# Patient Record
Sex: MALE | Race: WHITE | NOT HISPANIC OR LATINO | Employment: FULL TIME | URBAN - METROPOLITAN AREA
[De-identification: names, ages, dates, MRNs, and addresses within clinical notes are randomized per-mention and may not be internally consistent; named-entity substitution may affect disease eponyms.]

---

## 2019-02-07 ENCOUNTER — OFFICE VISIT (OUTPATIENT)
Dept: ORTHOPEDIC SURGERY | Facility: CLINIC | Age: 54
End: 2019-02-07

## 2019-02-07 VITALS — HEIGHT: 70 IN | BODY MASS INDEX: 32.93 KG/M2 | WEIGHT: 230 LBS | TEMPERATURE: 98 F

## 2019-02-07 DIAGNOSIS — M75.121 COMPLETE TEAR OF RIGHT ROTATOR CUFF: ICD-10-CM

## 2019-02-07 DIAGNOSIS — M54.2 NECK AND SHOULDER PAIN: ICD-10-CM

## 2019-02-07 DIAGNOSIS — Z87.828 HISTORY OF MOTOR VEHICLE ACCIDENT: Primary | ICD-10-CM

## 2019-02-07 DIAGNOSIS — M25.519 NECK AND SHOULDER PAIN: ICD-10-CM

## 2019-02-07 PROCEDURE — 73030 X-RAY EXAM OF SHOULDER: CPT | Performed by: ORTHOPAEDIC SURGERY

## 2019-02-07 PROCEDURE — 99204 OFFICE O/P NEW MOD 45 MIN: CPT | Performed by: ORTHOPAEDIC SURGERY

## 2019-02-07 RX ORDER — ALPRAZOLAM 1 MG/1
TABLET ORAL
COMMUNITY
End: 2019-08-27

## 2019-02-07 RX ORDER — HYDROCODONE BITARTRATE AND ACETAMINOPHEN 7.5; 325 MG/1; MG/1
TABLET ORAL
COMMUNITY
End: 2022-06-10

## 2019-02-07 RX ORDER — METOPROLOL SUCCINATE 50 MG/1
TABLET, EXTENDED RELEASE ORAL
COMMUNITY
End: 2019-09-20 | Stop reason: SDUPTHER

## 2019-02-07 RX ORDER — ATORVASTATIN CALCIUM 80 MG/1
80 TABLET, FILM COATED ORAL NIGHTLY
COMMUNITY

## 2019-02-07 RX ORDER — NAPROXEN 500 MG/1
TABLET ORAL
COMMUNITY
End: 2019-08-30

## 2019-02-07 RX ORDER — ZOLPIDEM TARTRATE 10 MG/1
TABLET ORAL
Status: ON HOLD | COMMUNITY
End: 2019-03-13

## 2019-02-07 RX ORDER — LISINOPRIL 10 MG/1
TABLET ORAL
Status: ON HOLD | COMMUNITY
End: 2019-03-13

## 2019-02-07 RX ORDER — TRAMADOL HYDROCHLORIDE 50 MG/1
TABLET ORAL
COMMUNITY
End: 2019-03-13 | Stop reason: HOSPADM
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
## 2019-02-14 PROBLEM — M75.121 COMPLETE TEAR OF RIGHT ROTATOR CUFF: Status: ACTIVE | Noted: 2019-02-14

## 2019-02-20 ENCOUNTER — TELEPHONE (OUTPATIENT)
Dept: ORTHOPEDIC SURGERY | Facility: CLINIC | Age: 54
End: 2019-02-20

## 2019-03-01 ENCOUNTER — APPOINTMENT (OUTPATIENT)
Dept: PREADMISSION TESTING | Facility: HOSPITAL | Age: 54
End: 2019-03-01

## 2019-03-01 VITALS
DIASTOLIC BLOOD PRESSURE: 84 MMHG | OXYGEN SATURATION: 95 % | WEIGHT: 228 LBS | RESPIRATION RATE: 20 BRPM | TEMPERATURE: 97.9 F | HEIGHT: 70 IN | BODY MASS INDEX: 32.64 KG/M2 | HEART RATE: 95 BPM | SYSTOLIC BLOOD PRESSURE: 152 MMHG

## 2019-03-01 LAB
ANION GAP SERPL CALCULATED.3IONS-SCNC: 11.6 MMOL/L
BUN BLD-MCNC: 16 MG/DL (ref 6–20)
BUN/CREAT SERPL: 18.6 (ref 7–25)
CALCIUM SPEC-SCNC: 9.1 MG/DL (ref 8.6–10.5)
CHLORIDE SERPL-SCNC: 107 MMOL/L (ref 98–107)
CO2 SERPL-SCNC: 22.4 MMOL/L (ref 22–29)
CREAT BLD-MCNC: 0.86 MG/DL (ref 0.76–1.27)
DEPRECATED RDW RBC AUTO: 42.5 FL (ref 37–54)
ERYTHROCYTE [DISTWIDTH] IN BLOOD BY AUTOMATED COUNT: 12.1 % (ref 12.3–15.4)
GFR SERPL CREATININE-BSD FRML MDRD: 94 ML/MIN/1.73
GLUCOSE BLD-MCNC: 132 MG/DL (ref 65–99)
HCT VFR BLD AUTO: 45.9 % (ref 37.5–51)
HGB BLD-MCNC: 15.4 G/DL (ref 13–17.7)
MCH RBC QN AUTO: 32 PG (ref 26.6–33)
MCHC RBC AUTO-ENTMCNC: 33.6 G/DL (ref 31.5–35.7)
MCV RBC AUTO: 95.4 FL (ref 79–97)
PLATELET # BLD AUTO: 210 10*3/MM3 (ref 140–450)
PMV BLD AUTO: 10.1 FL (ref 6–12)
POTASSIUM BLD-SCNC: 4.1 MMOL/L (ref 3.5–5.2)
RBC # BLD AUTO: 4.81 10*6/MM3 (ref 4.14–5.8)
SODIUM BLD-SCNC: 141 MMOL/L (ref 136–145)
WBC NRBC COR # BLD: 8.94 10*3/MM3 (ref 3.4–10.8)

## 2019-03-01 PROCEDURE — 36415 COLL VENOUS BLD VENIPUNCTURE: CPT

## 2019-03-01 PROCEDURE — 85027 COMPLETE CBC AUTOMATED: CPT | Performed by: ORTHOPAEDIC SURGERY

## 2019-03-01 PROCEDURE — 93010 ELECTROCARDIOGRAM REPORT: CPT | Performed by: INTERNAL MEDICINE

## 2019-03-01 PROCEDURE — 93005 ELECTROCARDIOGRAM TRACING: CPT

## 2019-03-01 PROCEDURE — 80048 BASIC METABOLIC PNL TOTAL CA: CPT | Performed by: ORTHOPAEDIC SURGERY

## 2019-03-01 RX ORDER — TRAMADOL HYDROCHLORIDE 50 MG/1
50 TABLET ORAL EVERY 6 HOURS PRN
Status: ON HOLD | COMMUNITY
End: 2019-03-13

## 2019-03-01 RX ORDER — CETIRIZINE HYDROCHLORIDE 10 MG/1
10 TABLET ORAL DAILY
COMMUNITY

## 2019-03-01 RX ORDER — ATORVASTATIN CALCIUM 80 MG/1
80 TABLET, FILM COATED ORAL EVERY EVENING
Status: ON HOLD | COMMUNITY
End: 2019-03-13

## 2019-03-01 RX ORDER — ZOLPIDEM TARTRATE 10 MG/1
10 TABLET ORAL NIGHTLY PRN
COMMUNITY
End: 2019-08-27

## 2019-03-01 RX ORDER — HYDROCODONE BITARTRATE AND ACETAMINOPHEN 7.5; 325 MG/1; MG/1
1 TABLET ORAL EVERY 6 HOURS PRN
Status: ON HOLD | COMMUNITY
End: 2019-03-13

## 2019-03-01 RX ORDER — LISINOPRIL 10 MG/1
10 TABLET ORAL EVERY EVENING
COMMUNITY
End: 2021-04-02 | Stop reason: SDUPTHER

## 2019-03-01 RX ORDER — METOPROLOL SUCCINATE 50 MG/1
50 TABLET, EXTENDED RELEASE ORAL EVERY EVENING
Status: ON HOLD | COMMUNITY
End: 2019-03-13

## 2019-03-01 RX ORDER — NAPROXEN 500 MG/1
500 TABLET ORAL AS NEEDED
Status: ON HOLD | COMMUNITY
End: 2019-03-13

## 2019-03-01 RX ORDER — ALPRAZOLAM 1 MG/1
1 TABLET ORAL 2 TIMES DAILY PRN
Status: ON HOLD | COMMUNITY
End: 2019-03-13

## 2019-03-01 NOTE — DISCHARGE INSTRUCTIONS
Take the following medications the morning of surgery with a small sip of water:  METOPROLOL        General Instructions:  • Do not eat solid food after midnight the night before surgery.  • You may drink clear liquids day of surgery but must stop at least one hour before your hospital arrival time.  • It is beneficial for you to have a clear drink that contains carbohydrates the day of surgery.  We suggest a 12 to 20 ounce bottle of Gatorade or Powerade for non-diabetic patients or a 12 to 20 ounce bottle of G2 or Powerade Zero for diabetic patients. (Pediatric patients, are not advised to drink a 12 to 20 ounce carbohydrate drink)    Clear liquids are liquids you can see through.  Nothing red in color.     Plain water                               Sports drinks  Sodas                                   Gelatin (Jell-O)  Fruit juices without pulp such as white grape juice and apple juice  Popsicles that contain no fruit or yogurt  Tea or coffee (no cream or milk added)  Gatorade / Powerade  G2 / Powerade Zero    • Infants may have breast milk up to four hours before surgery.  • Infants drinking formula may drink formula up to six hours before surgery.   • Patients who avoid smoking, chewing tobacco and alcohol for 4 weeks prior to surgery have a reduced risk of post-operative complications.  Quit smoking as many days before surgery as you can.  • Do not smoke, use chewing tobacco or drink alcohol the day of surgery.   • If applicable bring your C-PAP/ BI-PAP machine.  • Bring any papers given to you in the doctor’s office.  • Wear clean comfortable clothes and socks.  • Do not wear contact lenses or make-up.  Bring a case for your glasses.   • Bring crutches or walker if applicable.  • Remove all piercings.  Leave jewelry and any other valuables at home.  • Hair extensions with metal clips must be removed prior to surgery.  • The Pre-Admission Testing nurse will instruct you to bring medications if unable to obtain  an accurate list in Pre-Admission Testing.        If you were given a blood bank ID arm band remember to bring it with you the day of surgery.    Preventing a Surgical Site Infection:  • For 2 to 3 days before surgery, avoid shaving with a razor because the razor can irritate skin and make it easier to develop an infection.    • Any areas of open skin can increase the risk of a post-operative wound infection by allowing bacteria to enter and travel throughout the body.  Notify your surgeon if you have any skin wounds / rashes even if it is not near the expected surgical site.  The area will need assessed to determine if surgery should be delayed until it is healed.  • The night prior to surgery sleep in a clean bed with clean clothing.  Do not allow pets to sleep with you.  • Shower on the morning of surgery using a fresh bar of anti-bacterial soap (such as Dial) and clean washcloth.  Dry with a clean towel and dress in clean clothing.  • Ask your surgeon if you will be receiving antibiotics prior to surgery.  • Make sure you, your family, and all healthcare providers clean their hands with soap and water or an alcohol based hand  before caring for you or your wound.    Day of surgery:  Upon arrival, a Pre-op nurse and Anesthesiologist will review your health history, obtain vital signs, and answer questions you may have.  The only belongings needed at this time will be your home medications and if applicable your C-PAP/BI-PAP machine.  If you are staying overnight your family can leave the rest of your belongings in the car and bring them to your room later.  A Pre-op nurse will start an IV and you may receive medication in preparation for surgery, including something to help you relax.  Your family will be able to see you in the Pre-op area.  While you are in surgery your family should notify the waiting room  if they leave the waiting room area and provide a contact phone number.    Please be  aware that surgery does come with discomfort.  We want to make every effort to control your discomfort so please discuss any uncontrolled symptoms with your nurse.   Your doctor will most likely have prescribed pain medications.      If you are going home after surgery you will receive individualized written care instructions before being discharged.  A responsible adult must drive you to and from the hospital on the day of your surgery and stay with you for 24 hours.    If you are staying overnight following surgery, you will be transported to your hospital room following the recovery period.  [facility] has all private rooms.    You have received a list of surgical assistants for your reference.  If you have any questions please call Pre-Admission Testing at [phone].  Deductibles and co-payments are collected on the day of service. Please be prepared to pay the required co-pay, deductible or deposit on the day of service as defined by your plan.

## 2019-03-06 ENCOUNTER — APPOINTMENT (OUTPATIENT)
Dept: PREADMISSION TESTING | Facility: HOSPITAL | Age: 54
End: 2019-03-06

## 2019-03-07 ENCOUNTER — TELEPHONE (OUTPATIENT)
Dept: ORTHOPEDIC SURGERY | Facility: CLINIC | Age: 54
End: 2019-03-07

## 2019-03-08 ENCOUNTER — PREP FOR SURGERY (OUTPATIENT)
Dept: OTHER | Facility: HOSPITAL | Age: 54
End: 2019-03-08

## 2019-03-08 NOTE — TELEPHONE ENCOUNTER
This should be fine as long as all of his symptoms are gone we could check a white count that morning can you call him please

## 2019-03-08 NOTE — TELEPHONE ENCOUNTER
Left message for the patient that it should be okay for him to proceed with surgery next week.  Will plan to recheck his white blood cell count the morning of surgery per K Hg

## 2019-03-13 ENCOUNTER — HOSPITAL ENCOUNTER (OUTPATIENT)
Facility: HOSPITAL | Age: 54
Setting detail: HOSPITAL OUTPATIENT SURGERY
Discharge: HOME | End: 2019-03-13
Attending: ORTHOPAEDIC SURGERY | Admitting: ORTHOPAEDIC SURGERY

## 2019-03-13 ENCOUNTER — ANESTHESIA EVENT (OUTPATIENT)
Dept: PERIOP | Facility: HOSPITAL | Age: 54
End: 2019-03-13

## 2019-03-13 ENCOUNTER — ANESTHESIA (OUTPATIENT)
Dept: PERIOP | Facility: HOSPITAL | Age: 54
End: 2019-03-13

## 2019-03-13 VITALS
RESPIRATION RATE: 16 BRPM | DIASTOLIC BLOOD PRESSURE: 72 MMHG | TEMPERATURE: 97.8 F | BODY MASS INDEX: 31.57 KG/M2 | WEIGHT: 220.02 LBS | SYSTOLIC BLOOD PRESSURE: 141 MMHG | HEART RATE: 89 BPM | OXYGEN SATURATION: 92 %

## 2019-03-13 PROCEDURE — C9290 INJ, BUPIVACAINE LIPOSOME: HCPCS | Performed by: ANESTHESIOLOGY

## 2019-03-13 PROCEDURE — 25010000002 EPINEPHRINE PER 0.1 MG: Performed by: ORTHOPAEDIC SURGERY

## 2019-03-13 PROCEDURE — 25010000002 PHENYLEPHRINE PER 1 ML: Performed by: NURSE ANESTHETIST, CERTIFIED REGISTERED

## 2019-03-13 PROCEDURE — 25010000003 BUPIVACAINE LIPOSOME 1.3 % SUSPENSION: Performed by: ANESTHESIOLOGY

## 2019-03-13 PROCEDURE — 25010000002 ONDANSETRON PER 1 MG: Performed by: NURSE ANESTHETIST, CERTIFIED REGISTERED

## 2019-03-13 PROCEDURE — 25010000002 DEXAMETHASONE PER 1 MG: Performed by: NURSE ANESTHETIST, CERTIFIED REGISTERED

## 2019-03-13 PROCEDURE — 23412 REPAIR ROTATOR CUFF CHRONIC: CPT | Performed by: ORTHOPAEDIC SURGERY

## 2019-03-13 PROCEDURE — 25010000002 FENTANYL CITRATE (PF) 100 MCG/2ML SOLUTION: Performed by: ANESTHESIOLOGY

## 2019-03-13 PROCEDURE — 25010000002 PROPOFOL 10 MG/ML EMULSION: Performed by: NURSE ANESTHETIST, CERTIFIED REGISTERED

## 2019-03-13 PROCEDURE — 25010000003 CEFAZOLIN IN DEXTROSE 2-4 GM/100ML-% SOLUTION: Performed by: ORTHOPAEDIC SURGERY

## 2019-03-13 PROCEDURE — 25010000002 METHYLPREDNISOLONE PER 80 MG: Performed by: ORTHOPAEDIC SURGERY

## 2019-03-13 PROCEDURE — C1713 ANCHOR/SCREW BN/BN,TIS/BN: HCPCS | Performed by: ORTHOPAEDIC SURGERY

## 2019-03-13 PROCEDURE — 29826 SHO ARTHRS SRG DECOMPRESSION: CPT | Performed by: ORTHOPAEDIC SURGERY

## 2019-03-13 PROCEDURE — 25010000002 MIDAZOLAM PER 1 MG: Performed by: ANESTHESIOLOGY

## 2019-03-13 PROCEDURE — 29824 SHO ARTHRS SRG DSTL CLAVICLC: CPT | Performed by: ORTHOPAEDIC SURGERY

## 2019-03-13 DEVICE — SUT FW 2/0 38IN 1BLU 1BLK/WHT  AR7201: Type: IMPLANTABLE DEVICE | Site: SHOULDER | Status: FUNCTIONAL

## 2019-03-13 DEVICE — SYS SUT/ANCH BIOCOMP SPEEDBRIDGE SWIVELK 4.75X19.1: Type: IMPLANTABLE DEVICE | Site: SHOULDER | Status: FUNCTIONAL

## 2019-03-13 RX ORDER — DEXAMETHASONE SODIUM PHOSPHATE 10 MG/ML
INJECTION INTRAMUSCULAR; INTRAVENOUS AS NEEDED
Status: DISCONTINUED | OUTPATIENT
Start: 2019-03-13 | End: 2019-03-13 | Stop reason: SURG

## 2019-03-13 RX ORDER — SODIUM CHLORIDE, SODIUM LACTATE, POTASSIUM CHLORIDE, CALCIUM CHLORIDE 600; 310; 30; 20 MG/100ML; MG/100ML; MG/100ML; MG/100ML
9 INJECTION, SOLUTION INTRAVENOUS CONTINUOUS
Status: DISCONTINUED | OUTPATIENT
Start: 2019-03-13 | End: 2019-03-13 | Stop reason: HOSPADM

## 2019-03-13 RX ORDER — BUPIVACAINE HYDROCHLORIDE 2.5 MG/ML
INJECTION, SOLUTION EPIDURAL; INFILTRATION; INTRACAUDAL
Status: COMPLETED | OUTPATIENT
Start: 2019-03-13 | End: 2019-03-13

## 2019-03-13 RX ORDER — HYDROCODONE BITARTRATE AND ACETAMINOPHEN 7.5; 325 MG/1; MG/1
1 TABLET ORAL ONCE AS NEEDED
Status: DISCONTINUED | OUTPATIENT
Start: 2019-03-13 | End: 2019-03-13 | Stop reason: HOSPADM

## 2019-03-13 RX ORDER — CEFAZOLIN SODIUM 2 G/100ML
2 INJECTION, SOLUTION INTRAVENOUS ONCE
Status: COMPLETED | OUTPATIENT
Start: 2019-03-13 | End: 2019-03-13

## 2019-03-13 RX ORDER — FENTANYL CITRATE 50 UG/ML
50 INJECTION, SOLUTION INTRAMUSCULAR; INTRAVENOUS
Status: DISCONTINUED | OUTPATIENT
Start: 2019-03-13 | End: 2019-03-13 | Stop reason: HOSPADM

## 2019-03-13 RX ORDER — MIDAZOLAM HYDROCHLORIDE 1 MG/ML
1 INJECTION INTRAMUSCULAR; INTRAVENOUS
Status: DISCONTINUED | OUTPATIENT
Start: 2019-03-13 | End: 2019-03-13 | Stop reason: HOSPADM

## 2019-03-13 RX ORDER — PROMETHAZINE HYDROCHLORIDE 25 MG/ML
6.25 INJECTION, SOLUTION INTRAMUSCULAR; INTRAVENOUS ONCE AS NEEDED
Status: DISCONTINUED | OUTPATIENT
Start: 2019-03-13 | End: 2019-03-13 | Stop reason: HOSPADM

## 2019-03-13 RX ORDER — EPHEDRINE SULFATE 50 MG/ML
5 INJECTION, SOLUTION INTRAVENOUS ONCE AS NEEDED
Status: DISCONTINUED | OUTPATIENT
Start: 2019-03-13 | End: 2019-03-13 | Stop reason: HOSPADM

## 2019-03-13 RX ORDER — PROMETHAZINE HYDROCHLORIDE 25 MG/1
25 TABLET ORAL ONCE AS NEEDED
Status: DISCONTINUED | OUTPATIENT
Start: 2019-03-13 | End: 2019-03-13 | Stop reason: HOSPADM

## 2019-03-13 RX ORDER — PROPOFOL 10 MG/ML
VIAL (ML) INTRAVENOUS AS NEEDED
Status: DISCONTINUED | OUTPATIENT
Start: 2019-03-13 | End: 2019-03-13 | Stop reason: SURG

## 2019-03-13 RX ORDER — EPHEDRINE SULFATE 50 MG/ML
INJECTION, SOLUTION INTRAVENOUS AS NEEDED
Status: DISCONTINUED | OUTPATIENT
Start: 2019-03-13 | End: 2019-03-13 | Stop reason: SURG

## 2019-03-13 RX ORDER — ONDANSETRON 4 MG/1
4 TABLET, FILM COATED ORAL EVERY 8 HOURS PRN
Qty: 30 TABLET | Refills: 0 | Status: SHIPPED | OUTPATIENT
Start: 2019-03-13 | End: 2019-04-11

## 2019-03-13 RX ORDER — SODIUM CHLORIDE 0.9 % (FLUSH) 0.9 %
1-10 SYRINGE (ML) INJECTION AS NEEDED
Status: DISCONTINUED | OUTPATIENT
Start: 2019-03-13 | End: 2019-03-13 | Stop reason: HOSPADM

## 2019-03-13 RX ORDER — ONDANSETRON 2 MG/ML
4 INJECTION INTRAMUSCULAR; INTRAVENOUS ONCE AS NEEDED
Status: DISCONTINUED | OUTPATIENT
Start: 2019-03-13 | End: 2019-03-13 | Stop reason: HOSPADM

## 2019-03-13 RX ORDER — BUPIVACAINE HYDROCHLORIDE AND EPINEPHRINE 5; 5 MG/ML; UG/ML
INJECTION, SOLUTION EPIDURAL; INTRACAUDAL; PERINEURAL AS NEEDED
Status: DISCONTINUED | OUTPATIENT
Start: 2019-03-13 | End: 2019-03-13 | Stop reason: HOSPADM

## 2019-03-13 RX ORDER — MIDAZOLAM HYDROCHLORIDE 1 MG/ML
2 INJECTION INTRAMUSCULAR; INTRAVENOUS
Status: DISCONTINUED | OUTPATIENT
Start: 2019-03-13 | End: 2019-03-13 | Stop reason: HOSPADM

## 2019-03-13 RX ORDER — SODIUM CHLORIDE 0.9 % (FLUSH) 0.9 %
3 SYRINGE (ML) INJECTION EVERY 12 HOURS SCHEDULED
Status: DISCONTINUED | OUTPATIENT
Start: 2019-03-13 | End: 2019-03-13 | Stop reason: HOSPADM

## 2019-03-13 RX ORDER — PROMETHAZINE HYDROCHLORIDE 25 MG/1
25 SUPPOSITORY RECTAL ONCE AS NEEDED
Status: DISCONTINUED | OUTPATIENT
Start: 2019-03-13 | End: 2019-03-13 | Stop reason: HOSPADM

## 2019-03-13 RX ORDER — OXYCODONE AND ACETAMINOPHEN 7.5; 325 MG/1; MG/1
1 TABLET ORAL EVERY 4 HOURS PRN
Status: DISCONTINUED | OUTPATIENT
Start: 2019-03-13 | End: 2019-03-13 | Stop reason: HOSPADM

## 2019-03-13 RX ORDER — FAMOTIDINE 10 MG/ML
20 INJECTION, SOLUTION INTRAVENOUS ONCE
Status: COMPLETED | OUTPATIENT
Start: 2019-03-13 | End: 2019-03-13

## 2019-03-13 RX ORDER — FENTANYL CITRATE 50 UG/ML
100 INJECTION, SOLUTION INTRAMUSCULAR; INTRAVENOUS
Status: DISCONTINUED | OUTPATIENT
Start: 2019-03-13 | End: 2019-03-13 | Stop reason: HOSPADM

## 2019-03-13 RX ORDER — ONDANSETRON 2 MG/ML
INJECTION INTRAMUSCULAR; INTRAVENOUS AS NEEDED
Status: DISCONTINUED | OUTPATIENT
Start: 2019-03-13 | End: 2019-03-13 | Stop reason: SURG

## 2019-03-13 RX ORDER — OXYCODONE AND ACETAMINOPHEN 7.5; 325 MG/1; MG/1
TABLET ORAL
Qty: 60 TABLET | Refills: 0 | Status: SHIPPED | OUTPATIENT
Start: 2019-03-13 | End: 2019-03-25

## 2019-03-13 RX ORDER — LIDOCAINE HYDROCHLORIDE 20 MG/ML
INJECTION, SOLUTION INFILTRATION; PERINEURAL AS NEEDED
Status: DISCONTINUED | OUTPATIENT
Start: 2019-03-13 | End: 2019-03-13 | Stop reason: SURG

## 2019-03-13 RX ORDER — HYDROMORPHONE HYDROCHLORIDE 1 MG/ML
0.5 INJECTION, SOLUTION INTRAMUSCULAR; INTRAVENOUS; SUBCUTANEOUS
Status: DISCONTINUED | OUTPATIENT
Start: 2019-03-13 | End: 2019-03-13 | Stop reason: HOSPADM

## 2019-03-13 RX ORDER — METHYLPREDNISOLONE ACETATE 80 MG/ML
INJECTION, SUSPENSION INTRA-ARTICULAR; INTRALESIONAL; INTRAMUSCULAR; SOFT TISSUE AS NEEDED
Status: DISCONTINUED | OUTPATIENT
Start: 2019-03-13 | End: 2019-03-13 | Stop reason: HOSPADM

## 2019-03-13 RX ORDER — FLUMAZENIL 0.1 MG/ML
0.2 INJECTION INTRAVENOUS AS NEEDED
Status: DISCONTINUED | OUTPATIENT
Start: 2019-03-13 | End: 2019-03-13 | Stop reason: HOSPADM

## 2019-03-13 RX ORDER — LIDOCAINE HYDROCHLORIDE 10 MG/ML
0.5 INJECTION, SOLUTION EPIDURAL; INFILTRATION; INTRACAUDAL; PERINEURAL ONCE AS NEEDED
Status: COMPLETED | OUTPATIENT
Start: 2019-03-13 | End: 2019-03-13

## 2019-03-13 RX ADMIN — FENTANYL CITRATE 100 MCG: 50 INJECTION, SOLUTION INTRAMUSCULAR; INTRAVENOUS at 08:44

## 2019-03-13 RX ADMIN — PROPOFOL 200 MG: 10 INJECTION, EMULSION INTRAVENOUS at 10:19

## 2019-03-13 RX ADMIN — LIDOCAINE HYDROCHLORIDE 0.5 ML: 10 INJECTION, SOLUTION EPIDURAL; INFILTRATION; INTRACAUDAL; PERINEURAL at 08:16

## 2019-03-13 RX ADMIN — FENTANYL CITRATE 50 MCG: 50 INJECTION, SOLUTION INTRAMUSCULAR; INTRAVENOUS at 12:31

## 2019-03-13 RX ADMIN — PHENYLEPHRINE HYDROCHLORIDE 100 MCG: 10 INJECTION INTRAVENOUS at 10:30

## 2019-03-13 RX ADMIN — BUPIVACAINE 10 ML: 13.3 INJECTION, SUSPENSION, LIPOSOMAL INFILTRATION at 08:52

## 2019-03-13 RX ADMIN — PHENYLEPHRINE HYDROCHLORIDE 200 MCG: 10 INJECTION INTRAVENOUS at 10:55

## 2019-03-13 RX ADMIN — FAMOTIDINE 20 MG: 10 INJECTION INTRAVENOUS at 08:29

## 2019-03-13 RX ADMIN — FENTANYL CITRATE 50 MCG: 50 INJECTION, SOLUTION INTRAMUSCULAR; INTRAVENOUS at 12:49

## 2019-03-13 RX ADMIN — MIDAZOLAM 2 MG: 1 INJECTION INTRAMUSCULAR; INTRAVENOUS at 08:44

## 2019-03-13 RX ADMIN — CEFAZOLIN SODIUM 2 G: 2 INJECTION, SOLUTION INTRAVENOUS at 10:16

## 2019-03-13 RX ADMIN — PHENYLEPHRINE HYDROCHLORIDE 200 MCG: 10 INJECTION INTRAVENOUS at 10:43

## 2019-03-13 RX ADMIN — BUPIVACAINE HYDROCHLORIDE 10 ML: 2.5 INJECTION, SOLUTION EPIDURAL; INFILTRATION; INTRACAUDAL; PERINEURAL at 08:52

## 2019-03-13 RX ADMIN — ONDANSETRON 4 MG: 2 INJECTION INTRAMUSCULAR; INTRAVENOUS at 10:30

## 2019-03-13 RX ADMIN — DEXAMETHASONE SODIUM PHOSPHATE 4 MG: 10 INJECTION INTRAMUSCULAR; INTRAVENOUS at 10:30

## 2019-03-13 RX ADMIN — EPHEDRINE SULFATE 10 MG: 50 INJECTION INTRAMUSCULAR; INTRAVENOUS; SUBCUTANEOUS at 10:55

## 2019-03-13 RX ADMIN — EPHEDRINE SULFATE 10 MG: 50 INJECTION INTRAMUSCULAR; INTRAVENOUS; SUBCUTANEOUS at 11:00

## 2019-03-13 RX ADMIN — LIDOCAINE HYDROCHLORIDE 100 MG: 20 INJECTION, SOLUTION INFILTRATION; PERINEURAL at 10:19

## 2019-03-13 RX ADMIN — SODIUM CHLORIDE, POTASSIUM CHLORIDE, SODIUM LACTATE AND CALCIUM CHLORIDE 9 ML/HR: 600; 310; 30; 20 INJECTION, SOLUTION INTRAVENOUS at 08:16

## 2019-03-13 RX ADMIN — PHENYLEPHRINE HYDROCHLORIDE 100 MCG: 10 INJECTION INTRAVENOUS at 10:35

## 2019-03-13 RX ADMIN — OXYCODONE HYDROCHLORIDE AND ACETAMINOPHEN 1 TABLET: 7.5; 325 TABLET ORAL at 12:31

## 2019-03-13 RX ADMIN — PHENYLEPHRINE HYDROCHLORIDE 200 MCG: 10 INJECTION INTRAVENOUS at 10:25

## 2019-03-13 NOTE — OP NOTE
Rotator Cuff RepairOperative Note      Facility: [facility]  Patient Name: Colby Alvarado  YOB: 1965  Date: 3/13/2019  Medical Record Number: [id number]      Pre-op Diagnosis:   Complete tear of right rotator cuff [M75.121]    Post-Op Diagnosis Codes:     * Complete tear of right rotator cuff [M75.121]  Partial tear of the subscapularis, degenerative superior labral tear, impingement, acromioclavicular arthritis right  Procedure(s):  SHOULDER ARTHROSCOPY, DEBRIDEIMENT OF LABRUM AND SUBSCAP, DECEOMPRESSION, DISTAL CLAVICLE EXCISION,  WITH MINI OPEN ROTATOR CUFF REPAIR with Arthrex speed bridge    Surgeon(s):  Estela Townsend MD    Anesthesia: Choice  Anesthesiologist: Asif Fragoso MD  CRNA: Cat Tobias CRNA    Staff:   Circulator: Tere Fu RN; Delfina Bonilla RN  Scrub Person: Ajit Chow  Vendor Representative: Alfredito Catalan    Assistants :   None    Estimated Blood Loss: 5 none cc    Specimens:       Drains: None    Findings: See Dictation    Complications: None    Indication for procedure:   This patient has had a several month history of right shoulder pain that is been unresponsive to conservative management.  They have an exam and an MRI which are consistent with rotator cuff pathology and they present for arthroscopy and possible repair.  They understand all risks benefits and alternatives.  Risk including but not exclusive to anesthetic complications including death,  MI.  CVA as well as infection bleeding DVT PE stiffness,  failure to relieve their symptoms and need for future surgery.  They understand this and agree to proceed.    Description of procedure:  Patient was taken to the operating room.  They were placed supine on the  operating room table.  After induction of adequate LMA anesthesia and scalene nerve block and IV antibiotics.  They underwent exam under anesthesia was symmetric full range of motion which was symmetric side to side.   They were then placed in the modified beachchair position all prominent areas well padded and head well stabilized.  The arm was prepped and draped  in usual sterile fashion, bony landmarks were demarcated and the joint was infiltrated with 30 cc of fluid.  A standard posterior portal was made inferior and medial to the posterior lateral edge of the acromion with an 11 blade.  Blunt trocar penetrated into the joint scope following her evaluation began.  An anterior portal was established in the interval between the subscapularis and the biceps tendon with spinal needle localization and direct visualization. he was found have a degenerative tear of the superior aspect of the labrum that was debrided with the Apollo device and the motorized shaver.  He was also found to have a partial subscapularis tear that was debrided with a motorized shaver the rotator cuff appeared pathologic.    I then exited the space, entered subacromial space. I made accessory lateral portal off the anterior lateral edge of the acromion,placed the shaver and removed thickened bursa. I delineated the anterior lateral edge of the acromion with the Opus device and removed a large spur with a bone cutter. There was plenty of room under this joint after this for the rotator cuff.  he was found to have some acromioclavicular arthritis which was debrided.  The lateral 8-10 mm of clavicle resected through the lateral and the previously established anterior portalThe rotator cuff was evaluated and was found have a small full-thickness tear.. I did place a retention stitch with the scorpion device. I then exited the space, placed the Kobel retractors and readily identified the rotator cuff tear. The tuberosity was prepared with a rongeur.  2 Arthrex swivel lock anchors preloaded with fiber tape was placed in standard fashion with good bony pullout. A fiber tape was preloaded scorpion device was used to place each of the fiber wires one anterior one  posterior. I then placed a cinch stitch anterior and posterior and incorporated this cinch stitch and 2 fiber wires in each of 2 swivel lock anchors on the lateral row.  The decompression was adequate. Everything was thoroughly irrigated. The deltoid is reapproximated with 0 Vicryl, subcutaneous tissue with 2-0 Vicryl. The skin was closed with a running 4-0 subcuticular stitch. Sterile dressings Steri-Strips were applied. The portals were closed with 2-0 Vicryl. Sling was applied. The patient was taken to recovery room in good condition all sponge and needle count were correct.          Date: 3/13/2019  Time: 12:08 PM      shahbaz
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
## 2019-03-13 NOTE — ANESTHESIA PREPROCEDURE EVALUATION
Anesthesia Evaluation     Patient summary reviewed and Nursing notes reviewed   NPO Solid Status: > 8 hours             Airway   Mallampati: II  TM distance: >3 FB  Neck ROM: full  no difficulty expected  Dental - normal exam     Pulmonary - normal exam   (+) a smoker Current,   Cardiovascular - normal exam    (+) hypertension, dysrhythmias Atrial Fib,       Neuro/Psych- negative ROS  GI/Hepatic/Renal/Endo - negative ROS     Musculoskeletal (-) negative ROS    Abdominal  - normal exam   Substance History - negative use     OB/GYN negative ob/gyn ROS         Other                        Anesthesia Plan    ASA 2     general   (Isb popc)  intravenous induction   Anesthetic plan, all risks, benefits, and alternatives have been provided, discussed and informed consent has been obtained with: patient.    Plan discussed with CRNA.

## 2019-03-13 NOTE — DISCHARGE INSTRUCTIONS
What to expect after a Nerve Block    Nerve blocks administered to block pain affect many types of nerves, including those nerves that control movement, pain, and normal sensation. Following a nerve block, you may notice some bruising at the site where the block was given. You may experience sensations such as: numbness of the affected area or limb, tingling, heaviness (that is the limb feels heavy to you), weakness or inability to move the affected arm or leg, or a feeling as if your arm or leg has “fallen asleep.”     A nerve block can last from 2 to 36 hours depending on the medications used.  Usually the weakness wears off first followed by the tingling and heaviness. As the block wears off, you may begin to notice pain; however, this sequence of events may occur in any order. Typically, you will be able to move your limb before you will feel it. Once a nerve block begins to wear off, the effects are usually completely gone within 60 minutes.  If you experience continued side effects that you believe are block related for longer than 48 hours, please call your healthcare provider. Please see block-specific instructions below.    Instructions for any block involving the shoulder or arm  • If you have had any kind of shoulder/arm block, you will go home with your arm in a sling. Wear the sling until the block has completely worn off. You may be required to wear it for a longer period of time per your surgeon’s recommendations.  • If you have had a shoulder/arm block, it is a good idea to sleep on a recliner with pillows under your arm.    You may experience symptoms such as:  Shortness of breath  Hoarseness   Blurry vision  Unequal pupils  Drooping of your face on the same side as the block was performed    These are side effects associated with this kind of block and should go away within 12 hours.    Note: If you have severe or prolonged shortness of breath, please seek medical assistance as soon as  possible.     Protection of a “blocked” arm or leg (limb)  • After a nerve block, you cannot feel pain, pressure, or extremes of temperature in the affected limb. And because of this, your blocked limb is at more risk for injury. For example, it is possible to burn your limb on an extremely hot surface without feeling it.     • When resting, it is important to reposition your limb periodically to avoid prolonged pressure on it. This may require the use of pillows and padding.    • While sleeping, you should avoid rolling onto the affected limb or putting too much pressure on it.     • If you have a cast or tight dressing, check the color of your fingers or toes of the affected limb. Call your surgeon if they look discolored (that is, dusky, dark colored).    • Use caution in cold weather. Cover your limb appropriately to protect it from the cold.      Pain Management:    Your surgeon will give you a prescription for pain medication. Begin taking this before the nerve block wears off. Bear in mind that sometimes the block can wear off in the middle of the night.         You had 1 percocet 7.5mg [id number]

## 2019-03-13 NOTE — ANESTHESIA PROCEDURE NOTES
Airway  Urgency: elective      General Information and Staff    Patient location during procedure: OR  Anesthesiologist: Asif Fragoso MD  CRNA: Cat Tobias CRNA    Indications and Patient Condition  Indications for airway management: airway protection    Preoxygenated: yes  Mask difficulty assessment: 0 - not attempted    Final Airway Details  Final airway type: supraglottic airway      Successful airway: LMA  Size 5    Number of attempts at approach: 1    Additional Comments  Atraumatic placement

## 2019-03-13 NOTE — ANESTHESIA POSTPROCEDURE EVALUATION
Patient: Colby Alvarado    Procedure Summary     Date:  03/13/19 Room / Location:   [facility]    Anesthesia Start:  1012 Anesthesia Stop:  1157    Procedure:  SHOULDER ARTHROSCOPY, DEBRIDEIMENT OF LABRUM AND SUBSCAP, DECEOMPRESSION, DISTAL CLAVICLE EXCISION,  WITH MINI OPEN ROTATOR CUFF REPAIR (Right Shoulder) Diagnosis:       Complete tear of right rotator cuff      (Complete tear of right rotator cuff [M75.121])    Surgeon:  Estela Townsend MD Provider:  Asif Fragoso MD    Anesthesia Type:  general ASA Status:  2          Anesthesia Type: general  Last vitals  BP   127/72 (03/13/19 1205)   Temp   36.6 °C (97.8 °F) (03/13/19 1153)   Pulse   93 (03/13/19 1205)   Resp   16 (03/13/19 1205)     SpO2   97 % (03/13/19 1205)     Post Anesthesia Care and Evaluation    Patient location during evaluation: PACU  Patient participation: complete - patient participated  Level of consciousness: awake and alert  Pain management: adequate  Airway patency: patent  Anesthetic complications: No anesthetic complications    Cardiovascular status: acceptable  Respiratory status: acceptable  Hydration status: acceptable    Comments: --------------------            03/13/19               1205     --------------------   BP:       127/72     Pulse:      93       Resp:       16       Temp:                SpO2:      97%      --------------------

## 2019-03-13 NOTE — ANESTHESIA PROCEDURE NOTES
Peripheral Block    Pre-sedation assessment completed: 3/13/2019 8:42 AM    Patient reassessed immediately prior to procedure    Patient location during procedure: pre-op  Start time: 3/13/2019 8:42 AM  Stop time: 3/13/2019 8:52 AM  Performed by  Anesthesiologist: Asif Fragoso MD  Preanesthetic Checklist  Completed: patient identified, site marked, surgical consent, pre-op evaluation, timeout performed, IV checked, risks and benefits discussed and monitors and equipment checked  Prep:  Pt Position: supine  Sterile barriers:cap, gloves, mask and sterile barriers  Prep: ChloraPrep  Patient monitoring: blood pressure monitoring, continuous pulse oximetry and EKG  Procedure  Sedation:yes  Performed under: local infiltration  Guidance:ultrasound guided  ULTRASOUND INTERPRETATION.  Using ultrasound guidance a 22 G gauge needle was placed in close proximity to the brachial plexus nerve, at which point, under ultrasound guidance anesthetic was injected in the area of the nerve and spread of the anesthesia was seen on ultrasound in close proximity thereto.  There were no abnormalities seen on ultrasound; a digital image was taken; and the patient tolerated the procedure with no complications. Images:still images obtained    Laterality:right  Block Type:interscalene  Injection Technique:single-shot  Needle Type:echogenic  Needle Gauge:22 G  Resistance on Injection: less than 15 psi  Medications Used: bupivacaine liposome (EXPAREL) 1.3 % injection, 10 mL  bupivacaine PF (MARCAINE) 0.25 % injection, 10 mL  Med admintered at 3/13/2019 8:52 AM  Medications  Comment:exparel - 10cc  bup 0.25 % - 10cc    Post Assessment  Injection Assessment: negative aspiration for heme, no paresthesia on injection and incremental injection  Patient Tolerance:comfortable throughout block  Complications:no

## 2019-03-25 ENCOUNTER — OFFICE VISIT (OUTPATIENT)
Dept: ORTHOPEDIC SURGERY | Facility: CLINIC | Age: 54
End: 2019-03-25

## 2019-03-25 VITALS — BODY MASS INDEX: 32.64 KG/M2 | WEIGHT: 228 LBS | HEIGHT: 70 IN | TEMPERATURE: 98.2 F

## 2019-03-25 DIAGNOSIS — Z98.890 S/P ROTATOR CUFF REPAIR: Primary | ICD-10-CM

## 2019-03-25 PROCEDURE — 99024 POSTOP FOLLOW-UP VISIT: CPT | Performed by: ORTHOPAEDIC SURGERY

## 2019-03-25 NOTE — PROGRESS NOTES
Right Shoulder Scope follow Up 1st Visit      Patient: Colby Alvarado        YOB: 1965      Chief Complaints: Right shoulder pain  Chief Complaint   Patient presents with   • Right Shoulder - Post-op Follow-up, Pain           History of Present Illness: Pt is here f/u shoulder arthroscopy he is doing outstanding        Allergies:   Allergies   Allergen Reactions   • Sumatriptan Hives       Medications:   Home Medications:  Current Outpatient Medications on File Prior to Visit   Medication Sig   • ALPRAZolam (XANAX) 1 MG tablet alprazolam 1 mg tablet   Take 1 tablet 3 times a day by oral route.   • atorvastatin (LIPITOR) 80 MG tablet atorvastatin 80 mg tablet   TAKE 1 TABLET BY MOUTH EVERY DAY   • cetirizine (zyrTEC) 10 MG tablet Take 10 mg by mouth Daily.   • HYDROcodone-acetaminophen (NORCO) 7.5-325 MG per tablet hydrocodone 7.5 mg-acetaminophen 325 mg tablet   TAKE 1 TABLET BY MOUTH 4 TIMES A DAY FOR CHRONIC PAIN   • lisinopril (PRINIVIL,ZESTRIL) 10 MG tablet Take 10 mg by mouth Every Evening.   • metoprolol succinate XL (TOPROL-XL) 50 MG 24 hr tablet metoprolol succinate ER 50 mg tablet,extended release 24 hr   Take 1 tablet every day by oral route.   • naproxen (NAPROSYN) 500 MG tablet naproxen 500 mg tablet   Take 1 tablet twice a day by oral route.   • ondansetron (ZOFRAN) 4 MG tablet Take 1 tablet by mouth Every 8 (Eight) Hours As Needed for Nausea or Vomiting.   • zolpidem (AMBIEN) 10 MG tablet Take 10 mg by mouth At Night As Needed for Sleep.   • [DISCONTINUED] oxyCODONE-acetaminophen (PERCOCET) 7.5-325 MG per tablet  1-2 Oral Q4H PRN severe pain     No current facility-administered medications on file prior to visit.      Current Medications:  Scheduled Meds:  Continuous Infusions:  No current facility-administered medications for this visit.   PRN Meds:.          Physical Exam: 53 y.o. male  General Appearance:    Alert, cooperative, in no acute distress                 Vitals:     "03/25/19 1313   Temp: 98.2 °F (36.8 °C)   TempSrc: Temporal   Weight: 103 kg (228 lb)   Height: 177.8 cm (70\")      Patient is alert and oriented ×3 no acute distress normal mood physical exam.  Physical exam of the shoulder, incisions looked good there is no erythema,no signs or sx of infection.    Assessment  S/P shoulder scope.  I did review intraoperative findings and arthroscopic pictures with the patient.          Plan: To remove sutures today place Steri-Strips and start into  physical therapy and I will have thrm follow up in 4 weeks.  Patient normally takes Lortab so he will get back on his regular dose and will be followed by his primary care.              "

## 2019-03-26 ENCOUNTER — OFFICE VISIT (OUTPATIENT)
Dept: ORTHOPEDIC SURGERY | Facility: CLINIC | Age: 54
End: 2019-03-26

## 2019-03-26 VITALS — WEIGHT: 223 LBS | HEIGHT: 70 IN | TEMPERATURE: 97.7 F | BODY MASS INDEX: 31.92 KG/M2

## 2019-03-26 DIAGNOSIS — M48.02 CERVICAL SPINAL STENOSIS: ICD-10-CM

## 2019-03-26 DIAGNOSIS — M54.2 SPINE PAIN, CERVICAL: Primary | ICD-10-CM

## 2019-03-26 PROCEDURE — 72040 X-RAY EXAM NECK SPINE 2-3 VW: CPT | Performed by: ORTHOPAEDIC SURGERY

## 2019-03-26 PROCEDURE — 99214 OFFICE O/P EST MOD 30 MIN: CPT | Performed by: ORTHOPAEDIC SURGERY
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
## 2019-04-11 ENCOUNTER — ANESTHESIA EVENT (OUTPATIENT)
Dept: PAIN MEDICINE | Facility: HOSPITAL | Age: 54
End: 2019-04-11

## 2019-04-11 ENCOUNTER — TELEPHONE (OUTPATIENT)
Dept: ORTHOPEDIC SURGERY | Facility: CLINIC | Age: 54
End: 2019-04-11

## 2019-04-11 ENCOUNTER — HOSPITAL ENCOUNTER (OUTPATIENT)
Dept: PAIN MEDICINE | Facility: HOSPITAL | Age: 54
Discharge: HOME | End: 2019-04-11
Admitting: ANESTHESIOLOGY

## 2019-04-11 ENCOUNTER — HOSPITAL ENCOUNTER (OUTPATIENT)
Dept: GENERAL RADIOLOGY | Facility: HOSPITAL | Age: 54
Discharge: HOME | End: 2019-04-11

## 2019-04-11 ENCOUNTER — ANESTHESIA (OUTPATIENT)
Dept: PAIN MEDICINE | Facility: HOSPITAL | Age: 54
End: 2019-04-11

## 2019-04-11 VITALS
HEIGHT: 70 IN | OXYGEN SATURATION: 95 % | TEMPERATURE: 98.4 F | HEART RATE: 113 BPM | BODY MASS INDEX: 30.78 KG/M2 | RESPIRATION RATE: 16 BRPM | DIASTOLIC BLOOD PRESSURE: 103 MMHG | SYSTOLIC BLOOD PRESSURE: 144 MMHG | WEIGHT: 215 LBS

## 2019-04-11 DIAGNOSIS — M48.02 CERVICAL SPINAL STENOSIS: ICD-10-CM

## 2019-04-11 DIAGNOSIS — R52 PAIN: ICD-10-CM

## 2019-04-11 PROCEDURE — 25010000002 DEXAMETHASONE SODIUM PHOSPHATE 10 MG/ML SOLUTION: Performed by: ANESTHESIOLOGY

## 2019-04-11 PROCEDURE — C1755 CATHETER, INTRASPINAL: HCPCS

## 2019-04-11 PROCEDURE — 77003 FLUOROGUIDE FOR SPINE INJECT: CPT

## 2019-04-11 RX ORDER — DEXAMETHASONE SODIUM PHOSPHATE 10 MG/ML
10 INJECTION, SOLUTION INTRAMUSCULAR; INTRAVENOUS EVERY 6 HOURS
Status: DISCONTINUED | OUTPATIENT
Start: 2019-04-11 | End: 2019-04-12 | Stop reason: HOSPADM

## 2019-04-11 RX ORDER — LIDOCAINE HYDROCHLORIDE 10 MG/ML
1 INJECTION, SOLUTION INFILTRATION; PERINEURAL ONCE AS NEEDED
Status: DISCONTINUED | OUTPATIENT
Start: 2019-04-11 | End: 2019-04-12 | Stop reason: HOSPADM

## 2019-04-11 RX ORDER — SODIUM CHLORIDE 0.9 % (FLUSH) 0.9 %
1-10 SYRINGE (ML) INJECTION AS NEEDED
Status: DISCONTINUED | OUTPATIENT
Start: 2019-04-11 | End: 2019-04-12 | Stop reason: HOSPADM

## 2019-04-11 RX ORDER — FENTANYL CITRATE 50 UG/ML
50 INJECTION, SOLUTION INTRAMUSCULAR; INTRAVENOUS AS NEEDED
Status: DISCONTINUED | OUTPATIENT
Start: 2019-04-11 | End: 2019-04-12 | Stop reason: HOSPADM

## 2019-04-11 RX ORDER — MIDAZOLAM HYDROCHLORIDE 1 MG/ML
1 INJECTION INTRAMUSCULAR; INTRAVENOUS AS NEEDED
Status: DISCONTINUED | OUTPATIENT
Start: 2019-04-11 | End: 2019-04-12 | Stop reason: HOSPADM

## 2019-04-11 RX ADMIN — DEXAMETHASONE SODIUM PHOSPHATE 10 MG: 10 INJECTION, SOLUTION INTRAMUSCULAR; INTRAVENOUS at 08:21

## 2019-04-11 NOTE — TELEPHONE ENCOUNTER
----- Message from Pastor Edouard MD sent at 4/11/2019 11:15 AM EDT -----  Regarding: FW: donavan gao  Tell him that I personally reviewed the MRI scan and that it looks really bad at C4-5, 5–6, and 6–7.  He is failed to improve with conservative treatment and I suspect the bulk of his pain is coming from the neck at this point.  I am recommending the 3 level discectomy and fusion, go ahead and make him a new patient appointment put him out 3 or 4 weeks and by then his shoulder should be healed and we can talk about fixing the neck  ----- Message -----  From: Becka Deleon  Sent: 4/11/2019  10:28 AM  To: Pastor Edouard MD  Subject: donavan gao                                   Pt brought in CD.  It has been imported for your review and placed on your desk with reports.

## 2019-04-11 NOTE — TELEPHONE ENCOUNTER
Spoke with patient and he was informed of Dr Edouard's recommendations.  He was transferred to [facility] to make a follow up appointment.

## 2019-04-11 NOTE — ANESTHESIA PROCEDURE NOTES
PAIN Epidural block      Patient reassessed immediately prior to procedure    Patient location during procedure: pain clinic  Indication:procedure for pain  Performed By  Anesthesiologist: Colby Merchant MD  Preanesthetic Checklist  Completed: patient identified and risks and benefits discussed  Additional Notes  Diagnosis:   Post-Op Diagnosis Codes:     * Cervical neuritis (M54.12)     * Displacement of cervical intervertebral disc without myelopathy (M50.20)    Positive CAL screen/Diagnosis, 2 or more mitigating factors used (non-supine position, no narcotics)      Sedation:  none    Under fluoroscopic guidance, the epidural space was identified and accessed, confirmed by loss of resistance to saline.  The above medications were injected uneventfully.    Prep:  Pt Position:prone  Sterile Tech:cap, gloves, mask and sterile barrier  Prep:chlorhexidine gluconate and isopropyl alcohol  Monitoring:blood pressure monitoring, continuous pulse oximetry and EKG  Procedure:Sedation: no     Approach:midline  Guidance: fluoroscopy  Location:cervical  Level:6-7  Needle Type:Tuohy  Needle Gauge:20  Aspiration:negative  Medications:  Comments:Decadron 10 mg preservative-free  Post Assessment:  Pt Tolerance:patient tolerated the procedure well with no apparent complications  Complications:no
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
## 2019-04-22 ENCOUNTER — OFFICE VISIT (OUTPATIENT)
Dept: ORTHOPEDIC SURGERY | Facility: CLINIC | Age: 54
End: 2019-04-22

## 2019-04-22 VITALS — BODY MASS INDEX: 32.78 KG/M2 | TEMPERATURE: 98.2 F | WEIGHT: 229 LBS | HEIGHT: 70 IN

## 2019-04-22 DIAGNOSIS — Z98.890 S/P ROTATOR CUFF REPAIR: Primary | ICD-10-CM

## 2019-04-22 PROCEDURE — 99024 POSTOP FOLLOW-UP VISIT: CPT | Performed by: ORTHOPAEDIC SURGERY

## 2019-04-22 NOTE — PROGRESS NOTES
"Right Shoulder Scope RCR follow Up       Patient: Colby Alvarado        YOB: 1965      Chief Complaints: right shoulder pain  Chief Complaint   Patient presents with   • Right Shoulder - Pain, Post-op         History of Present Illness: Pt is here f/u shoulder arthroscopy, RCR he states is doing great he 6 weeks out and states his motion is good states that his therapist thinks he is way ahead of the game he has no pain at all        Allergies:   Allergies   Allergen Reactions   • Sumatriptan Hives       Medications:   Home Medications:  Current Outpatient Medications on File Prior to Visit   Medication Sig   • ALPRAZolam (XANAX) 1 MG tablet alprazolam 1 mg tablet   Take 1.5 a day by oral route.   • atorvastatin (LIPITOR) 80 MG tablet atorvastatin 80 mg tablet   TAKE 1 TABLET BY MOUTH EVERY DAY   • cetirizine (zyrTEC) 10 MG tablet Take 10 mg by mouth Daily.   • HYDROcodone-acetaminophen (NORCO) 7.5-325 MG per tablet hydrocodone 7.5 mg-acetaminophen 325 mg tablet   TAKE 1 TABLET BY MOUTH 4 TIMES A DAY FOR CHRONIC PAIN   • lisinopril (PRINIVIL,ZESTRIL) 10 MG tablet Take 10 mg by mouth Every Evening.   • metoprolol succinate XL (TOPROL-XL) 50 MG 24 hr tablet metoprolol succinate ER 50 mg tablet,extended release 24 hr   Take 1 tablet every day by oral route.   • naproxen (NAPROSYN) 500 MG tablet naproxen 500 mg tablet   Take 1 tablet twice a day by oral route.   • zolpidem (AMBIEN) 10 MG tablet Take 10 mg by mouth At Night As Needed for Sleep.     No current facility-administered medications on file prior to visit.      Current Medications:  Scheduled Meds:  Continuous Infusions:  No current facility-administered medications for this visit.   PRN Meds:.          Physical Exam: 53 y.o. male  General Appearance:    Alert, cooperative, in no acute distress                 Vitals:    04/22/19 1406   Temp: 98.2 °F (36.8 °C)   Weight: 104 kg (229 lb)   Height: 177.8 cm (70\")      Patient is alert and " oriented ×3 no acute distress normal mood physical exam.  Physical exam of the shoulder, incisions looked good there is no erythema,no signs or sx of infection.  Patient has full range of motion in all directions he has good rotator cuff strength  Assessment  S/P shoulder scope, rotator cuff repair,  He is doing outstanding I did caution him about doing too much too soon.  I explained the physiology of the fact that it takes about 12 weeks for this to heal to bone I still want him being very careful limiting what he does especially lifting I will see him back 6-8 weeks

## 2019-05-15 ENCOUNTER — OFFICE VISIT (OUTPATIENT)
Dept: ORTHOPEDIC SURGERY | Facility: CLINIC | Age: 54
End: 2019-05-15

## 2019-05-15 VITALS — WEIGHT: 226.2 LBS | BODY MASS INDEX: 32.38 KG/M2 | TEMPERATURE: 97.9 F | HEIGHT: 70 IN

## 2019-05-15 DIAGNOSIS — M48.02 CERVICAL SPINAL STENOSIS: Primary | ICD-10-CM

## 2019-05-15 DIAGNOSIS — M54.2 SPINE PAIN, CERVICAL: ICD-10-CM

## 2019-05-15 PROCEDURE — 99213 OFFICE O/P EST LOW 20 MIN: CPT | Performed by: ORTHOPAEDIC SURGERY

## 2019-05-15 NOTE — PROGRESS NOTES
New patient or new problem visit    Chief Complaint   Patient presents with   • Cervical Spine - Establish Care, Pain     He complains of continued neck pain primarily with occasional numbness in the hands.  He just underwent shoulder surgery and is hoping to return to work in 3 or 4 weeks.  He works as a  and needs to get established before taking any more time off.  On exam he has good strength in the upper and lower lower extremities, intact reflexes and sensation and no long track signs.  His MRI was personally reviewed and demonstrates multilevel degenerative change and sizable posterior lateral disc protrusions at 4556 and 6 7 and a smaller one at 3 4.  I believe would be looking at C4-7 anterior cervical discectomy and fusion should he come to surgery.  For that reason and as he has very minimal neurologic complaints I have recommended that he try epidural injections for now and I will see him back as needed I warned him of the neurologic signs of deterioration to look for.

## 2019-06-03 ENCOUNTER — OFFICE VISIT (OUTPATIENT)
Dept: ORTHOPEDIC SURGERY | Facility: CLINIC | Age: 54
End: 2019-06-03

## 2019-06-03 VITALS — TEMPERATURE: 98.4 F | BODY MASS INDEX: 32.64 KG/M2 | WEIGHT: 228 LBS | HEIGHT: 70 IN

## 2019-06-03 DIAGNOSIS — Z98.890 S/P ROTATOR CUFF REPAIR: Primary | ICD-10-CM

## 2019-06-03 PROCEDURE — 99024 POSTOP FOLLOW-UP VISIT: CPT | Performed by: ORTHOPAEDIC SURGERY

## 2019-06-03 NOTE — PROGRESS NOTES
Shoulder Scope RCR follow Up       Patient: Colby Alvarado        YOB: 1965      Chief Complaints: right shoulder pain  Chief Complaint   Patient presents with   • Right Shoulder - Pain, Post-op           History of Present Illness: Pt is here f/u shoulder arthroscopy, RCR he is doing outstanding he states he is doing push-ups and pull-ups.  I stopped him right there and said it is too early to be doing those things he needs to follow-up per protocol and really watch the things that he does overhead.  He has no pain and has good strength        Allergies:   Allergies   Allergen Reactions   • Sumatriptan Hives       Medications:   Home Medications:  Current Outpatient Medications on File Prior to Visit   Medication Sig   • ALPRAZolam (XANAX) 1 MG tablet alprazolam 1 mg tablet   Take 1.5 a day by oral route.   • atorvastatin (LIPITOR) 80 MG tablet atorvastatin 80 mg tablet   TAKE 1 TABLET BY MOUTH EVERY DAY   • cetirizine (zyrTEC) 10 MG tablet Take 10 mg by mouth Daily.   • HYDROcodone-acetaminophen (NORCO) 7.5-325 MG per tablet hydrocodone 7.5 mg-acetaminophen 325 mg tablet   TAKE 1 TABLET BY MOUTH 4 TIMES A DAY FOR CHRONIC PAIN   • lisinopril (PRINIVIL,ZESTRIL) 10 MG tablet Take 10 mg by mouth Every Evening.   • metoprolol succinate XL (TOPROL-XL) 50 MG 24 hr tablet metoprolol succinate ER 50 mg tablet,extended release 24 hr   Take 1 tablet every day by oral route.   • naproxen (NAPROSYN) 500 MG tablet naproxen 500 mg tablet   Take 1 tablet twice a day by oral route.   • zolpidem (AMBIEN) 10 MG tablet Take 10 mg by mouth At Night As Needed for Sleep.     No current facility-administered medications on file prior to visit.      Current Medications:  Scheduled Meds:  Continuous Infusions:  No current facility-administered medications for this visit.   PRN Meds:.          Physical Exam: 53 y.o. male  General Appearance:    Alert, cooperative, in no acute distress                 Vitals:    06/03/19  "1318   Temp: 98.4 °F (36.9 °C)   TempSrc: Temporal   Weight: 103 kg (228 lb)   Height: 176.5 cm (69.5\")      Patient is alert and oriented ×3 no acute distress normal mood physical exam.  Physical exam of the right shoulder, incisions looked good there is no erythema,no signs or sx of infection.  Physical exam of the l right shoulder reveals no overlying skin changes no lymphedema no lymphadenopathy.  The patient can actively flex to 180, abduction is similar external rotation is to 50, internal rotation to the upper lumbar spine.  Rotator cuff strength is 4+ to 5 over 5 with isometric strength testing without symptoms.  The patient has good cervical range of motion full and asymptomatic no radicular symptoms and a normal elbow exam.  Patient has good distal pulses    Assessment  S/P shoulder scope, rotator cuff repair, he is doing outstanding I did caution him about how to progress things he would like to return to work states he can stay within his limitations which I think is fine as long as he is doing well he can follow-up as needed        "

## 2019-06-27 ENCOUNTER — ANESTHESIA EVENT (OUTPATIENT)
Dept: PAIN MEDICINE | Facility: HOSPITAL | Age: 54
End: 2019-06-27

## 2019-06-27 ENCOUNTER — HOSPITAL ENCOUNTER (OUTPATIENT)
Dept: PAIN MEDICINE | Facility: HOSPITAL | Age: 54
Discharge: HOME | End: 2019-06-27
Admitting: ANESTHESIOLOGY

## 2019-06-27 ENCOUNTER — ANESTHESIA (OUTPATIENT)
Dept: PAIN MEDICINE | Facility: HOSPITAL | Age: 54
End: 2019-06-27

## 2019-06-27 ENCOUNTER — TELEPHONE (OUTPATIENT)
Dept: ORTHOPEDIC SURGERY | Facility: CLINIC | Age: 54
End: 2019-06-27

## 2019-06-27 ENCOUNTER — HOSPITAL ENCOUNTER (OUTPATIENT)
Dept: GENERAL RADIOLOGY | Facility: HOSPITAL | Age: 54
Discharge: HOME | End: 2019-06-27

## 2019-06-27 VITALS
WEIGHT: 227.07 LBS | RESPIRATION RATE: 16 BRPM | TEMPERATURE: 98.5 F | HEIGHT: 69 IN | OXYGEN SATURATION: 97 % | HEART RATE: 80 BPM | BODY MASS INDEX: 33.63 KG/M2 | SYSTOLIC BLOOD PRESSURE: 157 MMHG | DIASTOLIC BLOOD PRESSURE: 96 MMHG

## 2019-06-27 DIAGNOSIS — M50.20 DISPLACEMENT OF CERVICAL INTERVERTEBRAL DISC WITHOUT MYELOPATHY: Primary | ICD-10-CM

## 2019-06-27 DIAGNOSIS — R52 PAIN: ICD-10-CM

## 2019-06-27 PROCEDURE — 77003 FLUOROGUIDE FOR SPINE INJECT: CPT

## 2019-06-27 PROCEDURE — 25010000002 METHYLPREDNISOLONE PER 80 MG: Performed by: ANESTHESIOLOGY

## 2019-06-27 PROCEDURE — C1755 CATHETER, INTRASPINAL: HCPCS

## 2019-06-27 RX ORDER — FENTANYL CITRATE 50 UG/ML
100 INJECTION, SOLUTION INTRAMUSCULAR; INTRAVENOUS ONCE
Status: DISCONTINUED | OUTPATIENT
Start: 2019-06-27 | End: 2019-06-28 | Stop reason: HOSPADM

## 2019-06-27 RX ORDER — MIDAZOLAM HYDROCHLORIDE 1 MG/ML
2 INJECTION INTRAMUSCULAR; INTRAVENOUS ONCE
Status: DISCONTINUED | OUTPATIENT
Start: 2019-06-27 | End: 2019-06-28 | Stop reason: HOSPADM

## 2019-06-27 RX ORDER — LIDOCAINE HYDROCHLORIDE 10 MG/ML
1 INJECTION, SOLUTION INFILTRATION; PERINEURAL ONCE
Status: DISCONTINUED | OUTPATIENT
Start: 2019-06-27 | End: 2019-06-28 | Stop reason: HOSPADM

## 2019-06-27 RX ORDER — METHYLPREDNISOLONE ACETATE 80 MG/ML
80 INJECTION, SUSPENSION INTRA-ARTICULAR; INTRALESIONAL; INTRAMUSCULAR; SOFT TISSUE ONCE
Status: COMPLETED | OUTPATIENT
Start: 2019-06-27 | End: 2019-06-27

## 2019-06-27 RX ADMIN — METHYLPREDNISOLONE ACETATE 80 MG: 80 INJECTION, SUSPENSION INTRA-ARTICULAR; INTRALESIONAL; INTRAMUSCULAR; SOFT TISSUE at 08:54

## 2019-06-27 NOTE — ANESTHESIA PROCEDURE NOTES
PAIN Epidural block    Pre-sedation assessment completed: 6/27/2019 8:47 AM    Patient reassessed immediately prior to procedure    Patient location during procedure: pain clinic  Start Time: 6/27/2019 8:47 AM  Stop Time: 6/27/2019 8:55 AM  Indication:at surgeon's request and procedure for pain  Performed By  Anesthesiologist: Asif Fragoso MD  Preanesthetic Checklist  Completed: patient identified, site marked, surgical consent, pre-op evaluation, timeout performed, IV checked, risks and benefits discussed and monitors and equipment checked  Additional Notes  Dx : Post-Op Diagnosis Codes:     * Cervical disc displacement (M50.20)     * Cervical neuritis (M54.12)      Plan : return to clinic as needed  Prep:  Pt Position:prone (prone)  Sterile Tech:cap, gloves, mask and sterile barrier  Prep:chlorhexidine gluconate and isopropyl alcohol  Monitoring:blood pressure monitoring, continuous pulse oximetry and EKG  Procedure:Sedation: no     Approach:midline  Guidance: fluoroscopy and c arm pa and lat and loss of resistance  Location:cervical  Level:6-7 (interlaminar)  Needle Type:[employer]  Needle Gauge:20  Aspiration:negative  Medications:  Depomedrol:80 mg  Preservative Free Saline:3mL    Post Assessment:  Post-procedure: bandaid.  Pt Tolerance:patient tolerated the procedure well with no apparent complications  Complications:no

## 2019-06-27 NOTE — H&P
The patient returns for another Cervical epidural steroid injection 2 today.  They have received 50 % improvement since their last injection with a pain level of 6-7 /10 at its worst recently.    Conservative measures tried in the interim : the pain has also affected their ability to do their daily activities    Radiology reports and/ or previous notes have been reviewed and are consistent with their diagnosis of Post-Op Diagnosis Codes:     * Cervical disc displacement [M50.20]     * Cervical neuritis [M54.12]    Alert and oriented  MP - 2  Lungs - clear  CV - rrr    Diagnosis:  Post-Op Diagnosis Codes:     * Cervical disc displacement [M50.20]     * Cervical neuritis [M54.12]    Plan:  Cervical epidural steroid injection under fluoroscopic guidance    Target : C6-7    I have encouraged them to continue:  1.  Physical therapy exercises at home as prescribed by physical therapy or from the pain clinic handout (given to the patient).  Continuation of these exercises every day, or multiple times per week, even when the patient has good pain relief, was stressed to the patient as a preventative measure to decrease the frequency and severity of future pain episodes.  2.  Continue pain medicines as already prescribed.  If patient not currently taking any, it is recommended to begin Acetaminophen 1000 mg po q 8 hours.  If other medicines containing Acetaminophen are currently prescribed, maintain daily dose at 3000mg.    3.  If they can tolerate NSAIDS, it is recommended to take Ibuprofen 600 mg po q 6 hours for 7 days during pain exacerbations.  Alternatively, they may substitute an NSAID of their choice (e.g. Aleve)  4.  Heat and ice to the affected area as tolerated for pain control.  It was discussed that heating pads can cause burns.  5.  Low impact exercise such as walking or water exercise was recommended to maintain overall health and aid in weight control.   6.  Follow up as needed for subsequent injections.  7.   Patient was counseled to abstain from tobacco products.

## 2019-07-01 NOTE — TELEPHONE ENCOUNTER
I do not think he is ready to return to work without restrictions I really would like to see him first I think he needs to take it easy

## 2019-07-01 NOTE — TELEPHONE ENCOUNTER
Per DONATO, as of patient's last visit on 6/3 he is to follow-up as needed. If he is doing good then he does not need to follow-up; however, if he has any concerns then yes, the 7/18 appointment is fine.

## 2019-08-26 ENCOUNTER — APPOINTMENT (OUTPATIENT)
Dept: GENERAL RADIOLOGY | Facility: HOSPITAL | Age: 54
End: 2019-08-26

## 2019-08-26 ENCOUNTER — HOSPITAL ENCOUNTER (EMERGENCY)
Facility: HOSPITAL | Age: 54
Discharge: HOME | End: 2019-08-27
Attending: EMERGENCY MEDICINE | Admitting: EMERGENCY MEDICINE

## 2019-08-26 DIAGNOSIS — R07.9 CHEST PAIN, UNSPECIFIED TYPE: Primary | ICD-10-CM

## 2019-08-26 PROCEDURE — 99285 EMERGENCY DEPT VISIT HI MDM: CPT

## 2019-08-26 PROCEDURE — 83880 ASSAY OF NATRIURETIC PEPTIDE: CPT | Performed by: EMERGENCY MEDICINE

## 2019-08-26 PROCEDURE — 71045 X-RAY EXAM CHEST 1 VIEW: CPT

## 2019-08-26 PROCEDURE — 93005 ELECTROCARDIOGRAM TRACING: CPT

## 2019-08-26 PROCEDURE — 93005 ELECTROCARDIOGRAM TRACING: CPT | Performed by: EMERGENCY MEDICINE

## 2019-08-26 PROCEDURE — 83690 ASSAY OF LIPASE: CPT | Performed by: EMERGENCY MEDICINE

## 2019-08-26 PROCEDURE — 85025 COMPLETE CBC W/AUTO DIFF WBC: CPT | Performed by: EMERGENCY MEDICINE

## 2019-08-26 PROCEDURE — 80053 COMPREHEN METABOLIC PANEL: CPT | Performed by: EMERGENCY MEDICINE

## 2019-08-26 PROCEDURE — 84484 ASSAY OF TROPONIN QUANT: CPT | Performed by: EMERGENCY MEDICINE

## 2019-08-26 RX ORDER — ASPIRIN 81 MG/1
324 TABLET, CHEWABLE ORAL ONCE
Status: COMPLETED | OUTPATIENT
Start: 2019-08-26 | End: 2019-08-27

## 2019-08-26 RX ORDER — SODIUM CHLORIDE 0.9 % (FLUSH) 0.9 %
10 SYRINGE (ML) INJECTION AS NEEDED
Status: DISCONTINUED | OUTPATIENT
Start: 2019-08-26 | End: 2019-08-27 | Stop reason: HOSPADM

## 2019-08-26 RX ORDER — PANTOPRAZOLE SODIUM 20 MG/1
20 TABLET, DELAYED RELEASE ORAL DAILY
COMMUNITY
End: 2019-08-30

## 2019-08-27 VITALS
RESPIRATION RATE: 16 BRPM | TEMPERATURE: 97.9 F | WEIGHT: 220 LBS | BODY MASS INDEX: 31.5 KG/M2 | OXYGEN SATURATION: 96 % | HEART RATE: 78 BPM | SYSTOLIC BLOOD PRESSURE: 140 MMHG | DIASTOLIC BLOOD PRESSURE: 89 MMHG | HEIGHT: 70 IN

## 2019-08-27 LAB
ALBUMIN SERPL-MCNC: 4.9 G/DL (ref 3.5–5.2)
ALBUMIN/GLOB SERPL: 1.8 G/DL
ALP SERPL-CCNC: 107 U/L (ref 39–117)
ALT SERPL W P-5'-P-CCNC: 40 U/L (ref 1–41)
ANION GAP SERPL CALCULATED.3IONS-SCNC: 15 MMOL/L (ref 5–15)
AST SERPL-CCNC: 31 U/L (ref 1–40)
BASOPHILS # BLD AUTO: 0.04 10*3/MM3 (ref 0–0.2)
BASOPHILS NFR BLD AUTO: 0.5 % (ref 0–1.5)
BILIRUB SERPL-MCNC: 0.7 MG/DL (ref 0.2–1.2)
BUN BLD-MCNC: 12 MG/DL (ref 6–20)
BUN/CREAT SERPL: 13.5 (ref 7–25)
CALCIUM SPEC-SCNC: 9.5 MG/DL (ref 8.6–10.5)
CHLORIDE SERPL-SCNC: 102 MMOL/L (ref 98–107)
CO2 SERPL-SCNC: 23 MMOL/L (ref 22–29)
CREAT BLD-MCNC: 0.89 MG/DL (ref 0.76–1.27)
DEPRECATED RDW RBC AUTO: 38.5 FL (ref 37–54)
EOSINOPHIL # BLD AUTO: 0.14 10*3/MM3 (ref 0–0.4)
EOSINOPHIL NFR BLD AUTO: 1.6 % (ref 0.3–6.2)
ERYTHROCYTE [DISTWIDTH] IN BLOOD BY AUTOMATED COUNT: 11.4 % (ref 12.3–15.4)
GFR SERPL CREATININE-BSD FRML MDRD: 90 ML/MIN/1.73
GLOBULIN UR ELPH-MCNC: 2.7 GM/DL
GLUCOSE BLD-MCNC: 106 MG/DL (ref 65–99)
HCT VFR BLD AUTO: 45.1 % (ref 37.5–51)
HGB BLD-MCNC: 15.3 G/DL (ref 13–17.7)
HOLD SPECIMEN: NORMAL
HOLD SPECIMEN: NORMAL
IMM GRANULOCYTES # BLD AUTO: 0.04 10*3/MM3 (ref 0–0.05)
IMM GRANULOCYTES NFR BLD AUTO: 0.5 % (ref 0–0.5)
LIPASE SERPL-CCNC: 35 U/L (ref 13–60)
LYMPHOCYTES # BLD AUTO: 1.59 10*3/MM3 (ref 0.7–3.1)
LYMPHOCYTES NFR BLD AUTO: 18.5 % (ref 19.6–45.3)
MCH RBC QN AUTO: 31.3 PG (ref 26.6–33)
MCHC RBC AUTO-ENTMCNC: 33.9 G/DL (ref 31.5–35.7)
MCV RBC AUTO: 92.2 FL (ref 79–97)
MONOCYTES # BLD AUTO: 0.78 10*3/MM3 (ref 0.1–0.9)
MONOCYTES NFR BLD AUTO: 9.1 % (ref 5–12)
NEUTROPHILS # BLD AUTO: 6.01 10*3/MM3 (ref 1.7–7)
NEUTROPHILS NFR BLD AUTO: 69.8 % (ref 42.7–76)
NRBC BLD AUTO-RTO: 0 /100 WBC (ref 0–0.2)
NT-PROBNP SERPL-MCNC: 59.7 PG/ML (ref 5–900)
PLATELET # BLD AUTO: 256 10*3/MM3 (ref 140–450)
PMV BLD AUTO: 10 FL (ref 6–12)
POTASSIUM BLD-SCNC: 3.8 MMOL/L (ref 3.5–5.2)
PROT SERPL-MCNC: 7.6 G/DL (ref 6–8.5)
RBC # BLD AUTO: 4.89 10*6/MM3 (ref 4.14–5.8)
SODIUM BLD-SCNC: 140 MMOL/L (ref 136–145)
TROPONIN T SERPL-MCNC: <0.01 NG/ML (ref 0–0.03)
TROPONIN T SERPL-MCNC: <0.01 NG/ML (ref 0–0.03)
WBC NRBC COR # BLD: 8.6 10*3/MM3 (ref 3.4–10.8)
WHOLE BLOOD HOLD SPECIMEN: NORMAL
WHOLE BLOOD HOLD SPECIMEN: NORMAL

## 2019-08-27 PROCEDURE — 96374 THER/PROPH/DIAG INJ IV PUSH: CPT

## 2019-08-27 PROCEDURE — 93005 ELECTROCARDIOGRAM TRACING: CPT | Performed by: EMERGENCY MEDICINE

## 2019-08-27 PROCEDURE — 84484 ASSAY OF TROPONIN QUANT: CPT | Performed by: EMERGENCY MEDICINE

## 2019-08-27 PROCEDURE — 25010000002 MORPHINE PER 10 MG: Performed by: EMERGENCY MEDICINE

## 2019-08-27 RX ORDER — HYDROXYZINE HYDROCHLORIDE 25 MG/1
25 TABLET, FILM COATED ORAL NIGHTLY
Qty: 10 TABLET | Refills: 0 | Status: SHIPPED | OUTPATIENT
Start: 2019-08-27 | End: 2019-08-30 | Stop reason: SDUPTHER

## 2019-08-27 RX ORDER — MORPHINE SULFATE 2 MG/ML
2 INJECTION, SOLUTION INTRAMUSCULAR; INTRAVENOUS ONCE
Status: COMPLETED | OUTPATIENT
Start: 2019-08-27 | End: 2019-08-27

## 2019-08-27 RX ORDER — NITROGLYCERIN 0.4 MG/1
0.4 TABLET SUBLINGUAL
Status: DISCONTINUED | OUTPATIENT
Start: 2019-08-27 | End: 2019-08-27 | Stop reason: HOSPADM

## 2019-08-27 RX ADMIN — MORPHINE SULFATE 2 MG: 2 INJECTION, SOLUTION INTRAMUSCULAR; INTRAVENOUS at 01:41

## 2019-08-27 RX ADMIN — NITROGLYCERIN 0.4 MG: 0.4 TABLET SUBLINGUAL at 00:59

## 2019-08-27 RX ADMIN — ASPIRIN 81 MG 324 MG: 81 TABLET ORAL at 00:58

## 2019-08-30 ENCOUNTER — OFFICE VISIT (OUTPATIENT)
Dept: CARDIOLOGY | Facility: HOSPITAL | Age: 54
End: 2019-08-30

## 2019-08-30 VITALS
HEIGHT: 70 IN | OXYGEN SATURATION: 95 % | DIASTOLIC BLOOD PRESSURE: 79 MMHG | HEART RATE: 100 BPM | TEMPERATURE: 98.6 F | WEIGHT: 214 LBS | SYSTOLIC BLOOD PRESSURE: 135 MMHG | BODY MASS INDEX: 30.64 KG/M2

## 2019-08-30 DIAGNOSIS — R06.83 SNORING: ICD-10-CM

## 2019-08-30 DIAGNOSIS — G47.00 INSOMNIA, UNSPECIFIED TYPE: ICD-10-CM

## 2019-08-30 DIAGNOSIS — R07.89 CHEST DISCOMFORT: ICD-10-CM

## 2019-08-30 DIAGNOSIS — R10.13 DYSPEPSIA: ICD-10-CM

## 2019-08-30 DIAGNOSIS — Z98.890 HISTORY OF CARDIAC RADIOFREQUENCY ABLATION (RFA): ICD-10-CM

## 2019-08-30 DIAGNOSIS — I48.0 PAF (PAROXYSMAL ATRIAL FIBRILLATION) (HCC): Primary | ICD-10-CM

## 2019-08-30 PROCEDURE — 99204 OFFICE O/P NEW MOD 45 MIN: CPT | Performed by: NURSE PRACTITIONER

## 2019-08-30 RX ORDER — PANTOPRAZOLE SODIUM 40 MG/1
40 TABLET, DELAYED RELEASE ORAL DAILY
Qty: 30 TABLET | Refills: 0 | Status: SHIPPED | OUTPATIENT
Start: 2019-08-30 | End: 2019-09-25 | Stop reason: SDUPTHER

## 2019-08-30 RX ORDER — HYDROXYZINE HYDROCHLORIDE 25 MG/1
25 TABLET, FILM COATED ORAL NIGHTLY
Qty: 30 TABLET | Refills: 1 | Status: SHIPPED | OUTPATIENT
Start: 2019-08-30 | End: 2019-09-20 | Stop reason: SDUPTHER

## 2019-08-30 RX ORDER — SUCRALFATE 1 G/1
1 TABLET ORAL 4 TIMES DAILY
Qty: 120 TABLET | Refills: 0 | Status: SHIPPED | OUTPATIENT
Start: 2019-08-30 | End: 2020-01-23

## 2019-08-30 NOTE — PROGRESS NOTES
[facility]  [facility]      Encounter Date:08/29/2019     Colby Alvarado  [address] [place] [address]  [unfilled]    1965    MarkertAsif MD    Colby Alvarado is a 53 y.o. male.      Subjective:     Chief Complaint:  Establish Care and Chest Pain       HPI       53-year-old male presented to [facility] ED on 8/26/2019 with complaints of chest pain.  Left-sided chest pain, no radiation.  Sudden onset.  Patient had a cardiac ablation 1 month ago at [facility] since then he has been having chest pain waxing and waning.  3 days prior to ED visit symptoms became constant.  Reports poor quality of sleep related to pain. Hx of insomnia with chronic use of ambien (recently d/c'd).   Associated shortness of breath with exertion, dizziness, lightheadedness, nausea.  Denies abdominal pain, dyspepsia, vomiting, diarrhea.   Currently on Eliquis, metoprolol.  Hypertension controlled with lisinopril.  On a PPI (recently d/c'd)    Patient denies exertional chest pain or pressure.  States that he feels better with exercise.  He was able to walk several miles over the last 2 days.  He has more discomfort at night associated with anxiety.  He denies feeling A. fib recurrence but states he has not slept well and will wake feeling a fast heart heart rate.  He does snore at night.  Reports his wife states that he will stop breathing.  No history of sleep study.    Hx of Afib for 16 years.  No hx of ECV.  NO hx of antiarrythmic.  Hx of stress test 2003.  Reports an echo and CT Scan 1 week before surgery.  Patient is looking to transfer care to [facility] cardiology program.    Patient denies groin pain, hematuria, dysuria, melena, ecchymosis or hematoma.  No dysphagia.          Patient Active Problem List    Diagnosis Date Noted   • Complete tear of right rotator cuff 02/14/2019     Note Last Updated: 2/14/2019     Added automatically from request for  surgery [id number]           Past Surgical History:   Procedure Laterality Date   • BACK SURGERY     • CARDIAC ABLATION  07/31/2019   • EYE MUSCLE SURGERY Left    • HAND SURGERY Right     open fracture plate   • HARDWARE REMOVAL Right     rt hand  plate   • HERNIA REPAIR     • NASAL FRACTURE SURGERY     • SHOULDER ARTHROSCOPY W/ ROTATOR CUFF REPAIR Right 3/13/2019    Procedure: SHOULDER ARTHROSCOPY, DEBRIDEIMENT OF LABRUM AND SUBSCAP, DECEOMPRESSION, DISTAL CLAVICLE EXCISION,  WITH MINI OPEN ROTATOR CUFF REPAIR;  Surgeon: Estela Townsend MD;  Location: [facility];  Service: Orthopedics   • SHOULDER SURGERY     • SPINE SURGERY     • VASECTOMY         Allergies   Allergen Reactions   • Sumatriptan Hives         Current Outpatient Medications:   •  apixaban (ELIQUIS) 5 MG tablet tablet, Take 5 mg by mouth 2 (Two) Times a Day., Disp: , Rfl:   •  atorvastatin (LIPITOR) 80 MG tablet, atorvastatin 80 mg tablet  TAKE 1 TABLET BY MOUTH EVERY DAY, Disp: , Rfl:   •  cetirizine (zyrTEC) 10 MG tablet, Take 10 mg by mouth Daily., Disp: , Rfl:   •  HYDROcodone-acetaminophen (NORCO) 7.5-325 MG per tablet, hydrocodone 7.5 mg-acetaminophen 325 mg tablet  TAKE 1 TABLET BY MOUTH 4 TIMES A DAY FOR CHRONIC PAIN, Disp: , Rfl:   •  hydrOXYzine (ATARAX) 25 MG tablet, Take 1 tablet by mouth Every Night., Disp: 30 tablet, Rfl: 1  •  lisinopril (PRINIVIL,ZESTRIL) 10 MG tablet, Take 10 mg by mouth Every Evening., Disp: , Rfl:   •  metoprolol succinate XL (TOPROL-XL) 50 MG 24 hr tablet, metoprolol succinate ER 50 mg tablet,extended release 24 hr  Take 1 tablet every day by oral route., Disp: , Rfl:   •  pantoprazole (PROTONIX) 40 MG EC tablet, Take 1 tablet by mouth Daily., Disp: 30 tablet, Rfl: 0  •  sucralfate (CARAFATE) 1 g tablet, Take 1 tablet by mouth 4 (Four) Times a Day., Disp: 120 tablet, Rfl: 0    The following portions of the patient's history were reviewed and updated today during office visit as appropriate: allergies, current  "medications, past family history, past medical history, past social history, past surgical history and problem list.    Review of Systems   Cardiovascular: Positive for chest pain, dyspnea on exertion and palpitations.   Respiratory: Positive for sleep disturbances due to breathing and snoring.    Gastrointestinal: Positive for heartburn.   Neurological: Positive for light-headedness.   Psychiatric/Behavioral: The patient has insomnia and is nervous/anxious.    All other systems reviewed and are negative.      Objective:     Vitals:    08/30/19 0853 08/30/19 0857 08/30/19 0858   BP: 133/82 129/74 135/79   BP Location: Right arm Left arm Left arm   Patient Position: Sitting Sitting Standing   Cuff Size: Adult Adult Adult   Pulse: 93  100   Temp: 98.6 °F (37 °C)     TempSrc: Temporal     SpO2: 93%  95%   Weight: 97.1 kg (214 lb)     Height: 177.8 cm (70\")           Physical Exam   Constitutional: He is oriented to person, place, and time. He appears well-developed and well-nourished. No distress.   HENT:   Head: Normocephalic and atraumatic.   Mouth/Throat: Oropharynx is clear and moist.   Eyes: Conjunctivae are normal. Pupils are equal, round, and reactive to light. No scleral icterus.   Neck: No hepatojugular reflux and no JVD present. Carotid bruit is not present. No tracheal deviation present. No thyromegaly present.   Cardiovascular: Normal rate, regular rhythm, normal heart sounds and intact distal pulses. Exam reveals no friction rub.   No murmur heard.  Pulmonary/Chest: Effort normal and breath sounds normal.   Abdominal: Soft. Bowel sounds are normal. He exhibits no distension. There is no tenderness.   Musculoskeletal: He exhibits no edema.   Lymphadenopathy:     He has no cervical adenopathy.   Neurological: He is alert and oriented to person, place, and time.   Skin: Skin is warm, dry and intact. No rash noted. No cyanosis or erythema. No pallor.   Groin sites without ecchymosis, hematoma, bruit "   Psychiatric: He has a normal mood and affect. His behavior is normal. Thought content normal.   Vitals reviewed.      Lab and Diagnostic Review:    EKG 8/27/2019: Sinus rhythm 79 bpm    Chest x-ray 8/27/2019: No acute cardiopulmonary process.    Admission on 08/26/2019, Discharged on 08/27/2019   Component Date Value Ref Range Status   • Troponin T 08/26/2019 <0.010  0.000 - 0.030 ng/mL Final   • Glucose 08/26/2019 106* 65 - 99 mg/dL Final   • BUN 08/26/2019 12  6 - 20 mg/dL Final   • Creatinine 08/26/2019 0.89  0.76 - 1.27 mg/dL Final   • Sodium 08/26/2019 140  136 - 145 mmol/L Final   • Potassium 08/26/2019 3.8  3.5 - 5.2 mmol/L Final   • Chloride 08/26/2019 102  98 - 107 mmol/L Final   • CO2 08/26/2019 23.0  22.0 - 29.0 mmol/L Final   • Calcium 08/26/2019 9.5  8.6 - 10.5 mg/dL Final   • Total Protein 08/26/2019 7.6  6.0 - 8.5 g/dL Final   • Albumin 08/26/2019 4.90  3.50 - 5.20 g/dL Final   • ALT (SGPT) 08/26/2019 40  1 - 41 U/L Final   • AST (SGOT) 08/26/2019 31  1 - 40 U/L Final   • Alkaline Phosphatase 08/26/2019 107  39 - 117 U/L Final   • Total Bilirubin 08/26/2019 0.7  0.2 - 1.2 mg/dL Final   • eGFR Non African Amer 08/26/2019 90  >60 mL/min/1.73 Final   • Globulin 08/26/2019 2.7  gm/dL Final   • A/G Ratio 08/26/2019 1.8  g/dL Final   • BUN/Creatinine Ratio 08/26/2019 13.5  7.0 - 25.0 Final   • Anion Gap 08/26/2019 15.0  5.0 - 15.0 mmol/L Final   • Lipase 08/26/2019 35  13 - 60 U/L Final   • proBNP 08/26/2019 59.7  5.0 - 900.0 pg/mL Final   • Extra Tube 08/26/2019 hold for add-on   Final    Auto resulted   • Extra Tube 08/26/2019 Hold for add-ons.   Final    Auto resulted.   • Extra Tube 08/26/2019 hold for add-on   Final    Auto resulted   • Extra Tube 08/26/2019 Hold for add-ons.   Final    Auto resulted.   • WBC 08/26/2019 8.60  3.40 - 10.80 10*3/mm3 Final   • RBC 08/26/2019 4.89  4.14 - 5.80 10*6/mm3 Final   • Hemoglobin 08/26/2019 15.3  13.0 - 17.7 g/dL Final   • Hematocrit 08/26/2019 45.1  37.5 -  51.0 % Final   • MCV 08/26/2019 92.2  79.0 - 97.0 fL Final   • MCH 08/26/2019 31.3  26.6 - 33.0 pg Final   • MCHC 08/26/2019 33.9  31.5 - 35.7 g/dL Final   • RDW 08/26/2019 11.4* 12.3 - 15.4 % Final   • RDW-SD 08/26/2019 38.5  37.0 - 54.0 fl Final   • MPV 08/26/2019 10.0  6.0 - 12.0 fL Final   • Platelets 08/26/2019 256  140 - 450 10*3/mm3 Final   • Neutrophil % 08/26/2019 69.8  42.7 - 76.0 % Final   • Lymphocyte % 08/26/2019 18.5* 19.6 - 45.3 % Final   • Monocyte % 08/26/2019 9.1  5.0 - 12.0 % Final   • Eosinophil % 08/26/2019 1.6  0.3 - 6.2 % Final   • Basophil % 08/26/2019 0.5  0.0 - 1.5 % Final   • Immature Grans % 08/26/2019 0.5  0.0 - 0.5 % Final   • Neutrophils, Absolute 08/26/2019 6.01  1.70 - 7.00 10*3/mm3 Final   • Lymphocytes, Absolute 08/26/2019 1.59  0.70 - 3.10 10*3/mm3 Final   • Monocytes, Absolute 08/26/2019 0.78  0.10 - 0.90 10*3/mm3 Final   • Eosinophils, Absolute 08/26/2019 0.14  0.00 - 0.40 10*3/mm3 Final   • Basophils, Absolute 08/26/2019 0.04  0.00 - 0.20 10*3/mm3 Final   • Immature Grans, Absolute 08/26/2019 0.04  0.00 - 0.05 10*3/mm3 Final   • nRBC 08/26/2019 0.0  0.0 - 0.2 /100 WBC Final   • Troponin T 08/27/2019 <0.010  0.000 - 0.030 ng/mL Final       Assessment and Plan:         1. PAF (paroxysmal atrial fibrillation) (CMS/HCC)  Reported PVA 1 month ago at [facility]  Request medical records for review    Patient to continue Eliquis, Chadsvasc 1 hypertension  Continue beta-blocker.  With A. fib recurrence or rapid heart rate may take extra 25 mg of metoprolol succinate    A. fib education completed: What is atrial fibrillation, causes, triggers, signs and symptoms, medication management (rate control versus rhythm control) and stroke prevention, procedural management and indications, and the role of the atrial fibrillation center and when to call.  Discussed A. fib/PVA postprocedural expectations and management.      - Ambulatory Referral to Cardiology  - Ambulatory Referral to Sleep  Medicine    2. History of cardiac radiofrequency ablation (RFA)    - Ambulatory Referral to Cardiology    3. Dyspepsia    - sucralfate (CARAFATE) 1 g tablet; Take 1 tablet by mouth 4 (Four) Times a Day.  Dispense: 120 tablet; Refill: 0  - pantoprazole (PROTONIX) 40 MG EC tablet; Take 1 tablet by mouth Daily.  Dispense: 30 tablet; Refill: 0    4. Snoring  Rosalino risk factors, association of sleep apnea and A. fib.  - Ambulatory Referral to Sleep Medicine    5. Insomnia, unspecified type  F/u  with PCP for continued management  - hydrOXYzine (ATARAX) 25 MG tablet; Take 1 tablet by mouth Every Night.  Dispense: 30 tablet; Refill: 1    6. Chest discomfort  Monitor at this time.  Chest discomfort, and after PVA.  Start PPI and Carafate.  If symptoms continue or worsen will consider stress testing.  No exertional chest pain or pressure noted.      F/u with [facility] to be scheduled. 6-8 weeks.     F/u with [facility] as needed or is s/s continue or worsen    *Please note that portions of this note were completed with a voice recognition program. Efforts were made to edit the dictations, but occasionally words are mistranscribed.

## 2019-09-03 ENCOUNTER — DOCUMENTATION (OUTPATIENT)
Dept: CARDIOLOGY | Facility: HOSPITAL | Age: 54
End: 2019-09-03

## 2019-09-03 PROBLEM — I48.0 PAF (PAROXYSMAL ATRIAL FIBRILLATION): Status: ACTIVE | Noted: 2019-09-03

## 2019-09-03 NOTE — PROGRESS NOTES
Received previous echocardiogram and stress test result from [facility].  Have not received procedural report for PVA or last cardiology visit note.  Request again.

## 2019-09-04 ENCOUNTER — DOCUMENTATION (OUTPATIENT)
Dept: CARDIOLOGY | Facility: HOSPITAL | Age: 54
End: 2019-09-04

## 2019-09-04 NOTE — PROGRESS NOTES
Received ablation procedural note from [facility].  PVA with cryoablation completed at [facility] on 7/31/2019 with Dr. Wilian Webster.

## 2019-09-20 ENCOUNTER — HOSPITAL ENCOUNTER (OUTPATIENT)
Dept: CARDIOLOGY | Facility: HOSPITAL | Age: 54
Discharge: HOME | End: 2019-09-20
Admitting: NURSE PRACTITIONER

## 2019-09-20 ENCOUNTER — OFFICE VISIT (OUTPATIENT)
Dept: CARDIOLOGY | Facility: HOSPITAL | Age: 54
End: 2019-09-20

## 2019-09-20 VITALS
TEMPERATURE: 97 F | OXYGEN SATURATION: 96 % | WEIGHT: 212.44 LBS | DIASTOLIC BLOOD PRESSURE: 76 MMHG | HEIGHT: 70 IN | RESPIRATION RATE: 18 BRPM | SYSTOLIC BLOOD PRESSURE: 135 MMHG | HEART RATE: 101 BPM | BODY MASS INDEX: 30.41 KG/M2

## 2019-09-20 DIAGNOSIS — I10 ESSENTIAL HYPERTENSION: ICD-10-CM

## 2019-09-20 DIAGNOSIS — I48.0 PAF (PAROXYSMAL ATRIAL FIBRILLATION) (HCC): ICD-10-CM

## 2019-09-20 DIAGNOSIS — I48.0 PAF (PAROXYSMAL ATRIAL FIBRILLATION) (HCC): Primary | ICD-10-CM

## 2019-09-20 DIAGNOSIS — G47.00 INSOMNIA, UNSPECIFIED TYPE: ICD-10-CM

## 2019-09-20 PROCEDURE — 99214 OFFICE O/P EST MOD 30 MIN: CPT | Performed by: NURSE PRACTITIONER

## 2019-09-20 PROCEDURE — 93005 ELECTROCARDIOGRAM TRACING: CPT | Performed by: NURSE PRACTITIONER

## 2019-09-20 PROCEDURE — 93010 ELECTROCARDIOGRAM REPORT: CPT | Performed by: INTERNAL MEDICINE

## 2019-09-20 RX ORDER — TRAMADOL HYDROCHLORIDE 50 MG/1
50 TABLET ORAL 2 TIMES DAILY
Refills: 5 | COMMUNITY
Start: 2019-09-10 | End: 2021-04-02

## 2019-09-20 RX ORDER — HYDROXYZINE 50 MG/1
50 TABLET, FILM COATED ORAL NIGHTLY
Qty: 30 TABLET | Refills: 2 | Status: SHIPPED | OUTPATIENT
Start: 2019-09-20 | End: 2020-01-23

## 2019-09-20 RX ORDER — METOPROLOL SUCCINATE 50 MG/1
50 TABLET, EXTENDED RELEASE ORAL 2 TIMES DAILY
Qty: 60 TABLET | Refills: 3 | Status: SHIPPED | OUTPATIENT
Start: 2019-09-20 | End: 2022-02-24 | Stop reason: SDUPTHER
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
## 2019-09-21 DIAGNOSIS — G47.00 INSOMNIA, UNSPECIFIED TYPE: ICD-10-CM

## 2019-09-21 DIAGNOSIS — R10.13 DYSPEPSIA: ICD-10-CM

## 2019-09-25 ENCOUNTER — TELEPHONE (OUTPATIENT)
Dept: CARDIOLOGY | Facility: HOSPITAL | Age: 54
End: 2019-09-25

## 2019-09-25 DIAGNOSIS — R10.13 DYSPEPSIA: ICD-10-CM

## 2019-09-25 PROBLEM — I10 ESSENTIAL HYPERTENSION: Status: ACTIVE | Noted: 2017-10-30

## 2019-09-25 RX ORDER — PANTOPRAZOLE SODIUM 40 MG/1
40 TABLET, DELAYED RELEASE ORAL DAILY
Qty: 30 TABLET | Refills: 2 | Status: SHIPPED | OUTPATIENT
Start: 2019-09-25 | End: 2020-01-23

## 2019-09-25 RX ORDER — HYDROXYZINE HYDROCHLORIDE 25 MG/1
TABLET, FILM COATED ORAL
Qty: 30 TABLET | Refills: 1 | OUTPATIENT
Start: 2019-09-25

## 2019-09-25 RX ORDER — PANTOPRAZOLE SODIUM 40 MG/1
40 TABLET, DELAYED RELEASE ORAL DAILY
Qty: 30 TABLET | Refills: 3 | OUTPATIENT
Start: 2019-09-25

## 2019-09-25 NOTE — TELEPHONE ENCOUNTER
----- Message from Lisa Haq CMA sent at 9/25/2019  1:40 PM EDT -----  Sent in via coverFlynns for prior auth.     ----- Message -----  From: Luisa High APRN  Sent: 9/25/2019   9:15 AM  To: Lisa Haq CMA    Yes please address.   ----- Message -----  From: Lisa Haq CMA  Sent: 9/25/2019   9:07 AM  To: LUIS EDUARDO Ly    Received a phone call from [facility] in [place] about patient's Metoprolol ER 50mg BID. Insurance is requiring change to once daily or a PA on the BID dosing. I can send it through coverFlynns if you want.

## 2019-09-26 ENCOUNTER — DOCUMENTATION (OUTPATIENT)
Dept: CARDIOLOGY | Facility: HOSPITAL | Age: 54
End: 2019-09-26

## 2019-09-26 NOTE — PROGRESS NOTES
Colby Alvarado A   Male, 53 y.o., 1965  Weight:   96.4 kg (212 lb 7 oz)  Phone:   [phone] (H)  PCP:   Asif Simons MD  MRN:   [id number]  MyChart:   Pending  Next Appt:   10/24/2019  Message   Received: Yesterday   Message Contents   Luisa High APRN Morrison, Ashley M, CMA             Yes please address.    Previous Messages      ----- Message -----   From: Lisa Haq CMA   Sent: 9/25/2019   9:07 AM   To: LUIS EDUARDO Ly     Received a phone call from [facility] in [place] about patient's Metoprolol ER 50mg BID. Insurance is requiring change to once daily or a PA on the BID dosing. I can send it through covermymeds if you want.         Comments     9/26/2019 Called [facility] and was informed that PA was not needed and that override was all that is needed. I was given a number to the pharmacy to call.

## 2019-10-05 DIAGNOSIS — R10.13 DYSPEPSIA: ICD-10-CM

## 2019-10-08 RX ORDER — SUCRALFATE 1 G/1
1 TABLET ORAL 4 TIMES DAILY
Qty: 120 TABLET | Refills: 0 | OUTPATIENT
Start: 2019-10-08

## 2019-10-25 ENCOUNTER — HOSPITAL ENCOUNTER (OUTPATIENT)
Dept: GENERAL RADIOLOGY | Facility: HOSPITAL | Age: 54
Discharge: HOME | End: 2019-10-25

## 2019-10-25 ENCOUNTER — ANESTHESIA EVENT (OUTPATIENT)
Dept: PAIN MEDICINE | Facility: HOSPITAL | Age: 54
End: 2019-10-25

## 2019-10-25 ENCOUNTER — HOSPITAL ENCOUNTER (OUTPATIENT)
Dept: PAIN MEDICINE | Facility: HOSPITAL | Age: 54
Discharge: HOME | End: 2019-10-25
Admitting: ANESTHESIOLOGY

## 2019-10-25 ENCOUNTER — ANESTHESIA (OUTPATIENT)
Dept: PAIN MEDICINE | Facility: HOSPITAL | Age: 54
End: 2019-10-25

## 2019-10-25 VITALS
HEART RATE: 81 BPM | DIASTOLIC BLOOD PRESSURE: 90 MMHG | SYSTOLIC BLOOD PRESSURE: 155 MMHG | TEMPERATURE: 98.1 F | OXYGEN SATURATION: 96 % | RESPIRATION RATE: 16 BRPM

## 2019-10-25 DIAGNOSIS — M54.12 CERVICAL NEURITIS: Primary | ICD-10-CM

## 2019-10-25 DIAGNOSIS — R52 PAIN: ICD-10-CM

## 2019-10-25 PROCEDURE — C1755 CATHETER, INTRASPINAL: HCPCS

## 2019-10-25 PROCEDURE — 25010000002 METHYLPREDNISOLONE PER 80 MG: Performed by: ANESTHESIOLOGY

## 2019-10-25 PROCEDURE — 77003 FLUOROGUIDE FOR SPINE INJECT: CPT

## 2019-10-25 RX ORDER — MIDAZOLAM HYDROCHLORIDE 1 MG/ML
1 INJECTION INTRAMUSCULAR; INTRAVENOUS AS NEEDED
Status: DISCONTINUED | OUTPATIENT
Start: 2019-10-25 | End: 2019-10-26 | Stop reason: HOSPADM

## 2019-10-25 RX ORDER — SODIUM CHLORIDE 0.9 % (FLUSH) 0.9 %
1-10 SYRINGE (ML) INJECTION AS NEEDED
Status: DISCONTINUED | OUTPATIENT
Start: 2019-10-25 | End: 2019-10-26 | Stop reason: HOSPADM

## 2019-10-25 RX ORDER — LIDOCAINE HYDROCHLORIDE 10 MG/ML
1 INJECTION, SOLUTION INFILTRATION; PERINEURAL ONCE AS NEEDED
Status: DISCONTINUED | OUTPATIENT
Start: 2019-10-25 | End: 2019-10-26 | Stop reason: HOSPADM

## 2019-10-25 RX ORDER — METHYLPREDNISOLONE ACETATE 80 MG/ML
80 INJECTION, SUSPENSION INTRA-ARTICULAR; INTRALESIONAL; INTRAMUSCULAR; SOFT TISSUE ONCE
Status: COMPLETED | OUTPATIENT
Start: 2019-10-25 | End: 2019-10-25

## 2019-10-25 RX ORDER — FENTANYL CITRATE 50 UG/ML
50 INJECTION, SOLUTION INTRAMUSCULAR; INTRAVENOUS AS NEEDED
Status: DISCONTINUED | OUTPATIENT
Start: 2019-10-25 | End: 2019-10-26 | Stop reason: HOSPADM

## 2019-10-25 RX ADMIN — METHYLPREDNISOLONE ACETATE 80 MG: 80 INJECTION, SUSPENSION INTRA-ARTICULAR; INTRALESIONAL; INTRAMUSCULAR; SOFT TISSUE at 09:02

## 2019-10-25 NOTE — ANESTHESIA PROCEDURE NOTES
PAIN Epidural block    Pre-sedation assessment completed: 10/25/2019 8:54 AM    Patient reassessed immediately prior to procedure    Patient location during procedure: pain clinic  Start Time: 10/25/2019 8:56 AM  Stop Time: 10/25/2019 9:03 AM  Indication:procedure for pain  Performed By  Anesthesiologist: Venita Cannon MD  Preanesthetic Checklist  Completed: patient identified, site marked, surgical consent, pre-op evaluation, timeout performed, IV checked, risks and benefits discussed and monitors and equipment checked  Additional Notes  Fluoro used.    Cervical neuritis, disc displacement  Prep:  Pt Position:prone  Sterile Tech:cap, gloves, mask and sterile barrier  Prep:chlorhexidine gluconate and isopropyl alcohol  Monitoring:blood pressure monitoring, continuous pulse oximetry and EKG  Procedure:Sedation: no     Approach:midline  Guidance: fluoroscopy  Location:cervical  Level:6-7  Needle Type:Tuohy  Needle Gauge:20  Aspiration:negative  Medications:  Depomedrol:80  Preservative Free Saline:3mL    Post Assessment:  Dressing:occlusive dressing applied  Pt Tolerance:patient tolerated the procedure well with no apparent complications  Complications:no

## 2019-10-25 NOTE — H&P
[facility]    History and Physical    Patient Name: Colby Alvarado  :  1965  MRN:  [id number]  Date of Admission: 10/25/2019    Subjective     Patient is a 53 y.o. male presents with chief complaint of chronic, moderate, severe neck, shoulder: bilateral and upper back pain.  Onset of symptoms was gradual starting several years ago.  Symptoms are associated/aggravated by nothing in particular. Symptoms improve with injection    The following portions of the patients history were reviewed and updated as appropriate: current medications, allergies, past medical history, past surgical history, past family history, past social history and problem list                Objective     Past Medical History:   Past Medical History:   Diagnosis Date   • Anxiety    • Arthritis of back    • Atrial fibrillation (CMS/HCC)    • Chronic pain    • Depression    • H/O cluster headache    • Hearing loss    • Hyperlipidemia    • Hypertension    • Insomnia    • Periarthritis of shoulder    • Seasonal allergies      Past Surgical History:   Past Surgical History:   Procedure Laterality Date   • BACK SURGERY     • CARDIAC ABLATION  2019   • EYE MUSCLE SURGERY Left    • HAND SURGERY Right     open fracture plate   • HARDWARE REMOVAL Right     rt hand  plate   • HERNIA REPAIR     • NASAL FRACTURE SURGERY     • SHOULDER ARTHROSCOPY W/ ROTATOR CUFF REPAIR Right 3/13/2019    Procedure: SHOULDER ARTHROSCOPY, DEBRIDEIMENT OF LABRUM AND SUBSCAP, DECEOMPRESSION, DISTAL CLAVICLE EXCISION,  WITH MINI OPEN ROTATOR CUFF REPAIR;  Surgeon: Estela Townsend MD;  [facility]: [facility];  Service: Orthopedics   • SHOULDER SURGERY     • SPINE SURGERY     • VASECTOMY       Family History:   Family History   Problem Relation Age of Onset   • Hypertension Other    • Heart disease Other    • Lung disease Other    • Emphysema Mother    • Hypertension Mother    • Heart disease Mother    • Hypertension Sister    • No Known Problems Brother     • Heart attack Maternal Grandmother    • Emphysema Maternal Grandmother    • Heart attack Maternal Grandfather    • Emphysema Maternal Grandfather    • Malig Hyperthermia Neg Hx      Social History:   Social History     Tobacco Use   • Smoking status: Current Every Day Smoker     Packs/day: 1.50     Years: 30.00     Pack years: 45.00     Types: Cigarettes   • Smokeless tobacco: Former User     Types: Snuff   Substance Use Topics   • Alcohol use: Yes     Alcohol/week: 9.6 oz     Types: 16 Cans of beer per week   • Drug use: No       Vital Signs Range for the last 24 hours  Temperature: Temp:  [36.7 °C (98.1 °F)] 36.7 °C (98.1 °F)   Temp Source: Temp src: Oral   BP: BP: (160)/(86) 160/86   Pulse: Heart Rate:  [79] 79   Respirations: Resp:  [16] 16   SPO2: SpO2:  [95 %] 95 %   O2 Amount (l/min):     O2 Devices Device (Oxygen Therapy): room air   Weight:           --------------------------------------------------------------------------------    Current Outpatient Medications   Medication Sig Dispense Refill   • atorvastatin (LIPITOR) 80 MG tablet atorvastatin 80 mg tablet   TAKE 1 TABLET BY MOUTH EVERY DAY     • cetirizine (zyrTEC) 10 MG tablet Take 10 mg by mouth Daily.     • HYDROcodone-acetaminophen (NORCO) 7.5-325 MG per tablet hydrocodone 7.5 mg-acetaminophen 325 mg tablet   TAKE 1 TABLET BY MOUTH 4 TIMES A DAY FOR CHRONIC PAIN     • hydrOXYzine (ATARAX) 50 MG tablet Take 1 tablet by mouth Every Night. 30 tablet 2   • lisinopril (PRINIVIL,ZESTRIL) 10 MG tablet Take 10 mg by mouth Every Evening.     • metoprolol succinate XL (TOPROL-XL) 50 MG 24 hr tablet Take 1 tablet by mouth 2 (Two) Times a Day. 60 tablet 3   • pantoprazole (PROTONIX) 40 MG EC tablet Take 1 tablet by mouth Daily. 30 tablet 2   • sucralfate (CARAFATE) 1 g tablet Take 1 tablet by mouth 4 (Four) Times a Day. 120 tablet 0   • traMADol (ULTRAM) 50 MG tablet TAKE 1 TABLET BY MOUTH 4 TIMES A DAY AS NEEDED FOR CHRONIC PAIN  5     No current  facility-administered medications for this encounter.        --------------------------------------------------------------------------------  Assessment/Plan      Anesthesia Evaluation     Patient summary reviewed and Nursing notes reviewed                Airway   Mallampati: II  TM distance: >3 FB  Dental - normal exam     Pulmonary - normal exam   (+) a smoker Current,   Cardiovascular - normal exam    Rhythm: regular    (+) hypertension, dysrhythmias Atrial Fib, hyperlipidemia,       Neuro/Psych- neuro exam normal  (+) psychiatric history Anxiety and Depression,     GI/Hepatic/Renal/Endo - negative ROS     Musculoskeletal (-) normal exam    (+) chronic pain, neck pain,   Abdominal  - normal exam   Substance History - negative use     OB/GYN negative ob/gyn ROS         Other   (+) arthritis                Diagnosis and Plan    Treatment Plan  ASA 3   Patient has had previous injection/procedure with 50-75% improvement.   Procedures: Cervical Epidural Steroid Injection(SHAY), With fluoroscopy,       Anesthetic plan and risks discussed with patient.          Diagnosis     * Cervical neuritis [M54.12]     * Displacement of cervical intervertebral disc without myelopathy [M50.20]

## 2019-10-27 DIAGNOSIS — G47.00 INSOMNIA, UNSPECIFIED TYPE: ICD-10-CM

## 2019-10-29 RX ORDER — HYDROXYZINE HYDROCHLORIDE 25 MG/1
TABLET, FILM COATED ORAL
Qty: 30 TABLET | Refills: 6 | Status: SHIPPED | OUTPATIENT
Start: 2019-10-29 | End: 2020-01-23

## 2019-12-06 ENCOUNTER — OFFICE VISIT (OUTPATIENT)
Dept: ORTHOPEDIC SURGERY | Facility: CLINIC | Age: 54
End: 2019-12-06

## 2019-12-06 VITALS — TEMPERATURE: 98.5 F | BODY MASS INDEX: 30.35 KG/M2 | HEIGHT: 70 IN | WEIGHT: 212 LBS

## 2019-12-06 DIAGNOSIS — M47.22 CERVICAL SPONDYLOSIS WITH RADICULOPATHY: Primary | ICD-10-CM

## 2019-12-06 PROCEDURE — 99213 OFFICE O/P EST LOW 20 MIN: CPT | Performed by: ORTHOPAEDIC SURGERY

## 2019-12-06 NOTE — PROGRESS NOTES
He had good temporary relief from the epidurals but now has some recurrent arm pain and numbness.  Rare symptoms of imbalance no bowel or bladder symptoms.  On exam today no long track signs no clonus no hyperreflexia and his gait is completely unremarkable.  I reviewed the MRI scan changes from 4-7 most predominantly the left-sided C5-6 fairly large protrusion which deforms but does not cause signal change in the cord.  He needs to stay at work during their transitional period.  And I think he is fine to do so, I explained there is only a tiny risk of deterioration neurologically and I think it would be fine to wait a couple of months or so.  Come back and see me in 2 months for repeat neurologic evaluation in any case.

## 2019-12-12 DIAGNOSIS — G47.00 INSOMNIA, UNSPECIFIED TYPE: ICD-10-CM

## 2019-12-17 RX ORDER — HYDROXYZINE 50 MG/1
TABLET, FILM COATED ORAL
Qty: 90 TABLET | Refills: 0 | OUTPATIENT
Start: 2019-12-17

## 2019-12-17 NOTE — TELEPHONE ENCOUNTER
Patient last seen on 9/20/19 and was a no show at follow-up on 11/1/19. Per LUIS EDUARDO Ly patient needs to contact PCP for further refills.     Savanna LaboyD

## 2019-12-19 DIAGNOSIS — R10.13 DYSPEPSIA: ICD-10-CM

## 2019-12-19 RX ORDER — PANTOPRAZOLE SODIUM 40 MG/1
TABLET, DELAYED RELEASE ORAL
Qty: 90 TABLET | Refills: 0 | OUTPATIENT
Start: 2019-12-19

## 2020-01-21 ENCOUNTER — APPOINTMENT (OUTPATIENT)
Dept: SLEEP MEDICINE | Facility: HOSPITAL | Age: 55
End: 2020-01-21

## 2020-01-23 ENCOUNTER — CONSULT (OUTPATIENT)
Dept: CARDIOLOGY | Facility: CLINIC | Age: 55
End: 2020-01-23

## 2020-01-23 VITALS
OXYGEN SATURATION: 97 % | HEART RATE: 77 BPM | HEIGHT: 70 IN | WEIGHT: 215 LBS | BODY MASS INDEX: 30.78 KG/M2 | DIASTOLIC BLOOD PRESSURE: 100 MMHG | SYSTOLIC BLOOD PRESSURE: 151 MMHG

## 2020-01-23 DIAGNOSIS — I48.0 PAF (PAROXYSMAL ATRIAL FIBRILLATION) (HCC): Primary | ICD-10-CM

## 2020-01-23 DIAGNOSIS — I10 ESSENTIAL HYPERTENSION: ICD-10-CM

## 2020-01-23 PROCEDURE — 93000 ELECTROCARDIOGRAM COMPLETE: CPT | Performed by: INTERNAL MEDICINE

## 2020-01-23 PROCEDURE — 99204 OFFICE O/P NEW MOD 45 MIN: CPT | Performed by: INTERNAL MEDICINE

## 2020-01-23 RX ORDER — HYDROXYZINE 50 MG/1
50 TABLET, FILM COATED ORAL NIGHTLY
Qty: 30 TABLET | Refills: 3 | Status: SHIPPED | OUTPATIENT
Start: 2020-01-23 | End: 2020-05-11

## 2020-01-23 RX ORDER — ASPIRIN 81 MG/1
81 TABLET ORAL DAILY
COMMUNITY
End: 2021-04-02

## 2020-01-23 RX ORDER — TOPIRAMATE 50 MG/1
50 TABLET, FILM COATED ORAL 2 TIMES DAILY
COMMUNITY
End: 2021-04-02

## 2020-01-23 RX ORDER — ZOLPIDEM TARTRATE 10 MG/1
10 TABLET ORAL NIGHTLY PRN
COMMUNITY

## 2020-01-23 RX ORDER — NAPROXEN 500 MG/1
500 TABLET ORAL DAILY
COMMUNITY
End: 2022-11-09 | Stop reason: SDUPTHER

## 2020-01-23 RX ORDER — HYDROCHLOROTHIAZIDE 12.5 MG/1
12.5 CAPSULE, GELATIN COATED ORAL DAILY
Qty: 30 CAPSULE | Refills: 11 | Status: SHIPPED | OUTPATIENT
Start: 2020-01-23 | End: 2021-04-02

## 2020-01-23 RX ORDER — SUMATRIPTAN 6 MG/.5ML
6 INJECTION, SOLUTION SUBCUTANEOUS AS NEEDED
COMMUNITY
End: 2022-11-09 | Stop reason: SDUPTHER

## 2020-01-23 NOTE — PROGRESS NOTES
Colby Alvarado  1965  PCP: Asif Simons MD    SUBJECTIVE:   Colby Alvarado is a 54 y.o. male seen for a consultation visit regarding the following:     Chief Complaint:   Chief Complaint   Patient presents with   • Hypertension     consult   • Chest Pain          Consultation is requested by LUIS EDUARDO Ly for evaluation of Hypertension (consult) and Chest Pain        History:  This is a 54-year-old male with a history of PAF s/p PVA at [facility] 7/31/2019. He reports having complications from the procedure. Having issues with CP, HTN, and sleeping issues since the procedure. The CP has been improving since the fall of 2019. CP resolved around Dec 2019. Took Carafate and it improved. BP has been high home at times. Having headaches. When his BP is high he has headaches.       Cardiac PMH: (Old records have been reviewed and summarized below)  1. Paroxysmal atrial [id number]/03/2019  2. Echocardiogram 7/25/2019 ([facility]): No significant valvular heart disease, normal LV systolic function with abnormal systolic strain pattern.  Normal LV diastolic function.  3. GXT 1/17/2018 ([facility]) no ischemia  4. PVA ([facility]) 07/31/19:  Cryoballo ablation.         Past Medical History, Past Surgical History, Family history, Social History, and Medications were all reviewed with the patient today and updated as necessary.       Current Outpatient Medications:   •  aspirin 81 MG EC tablet, Take 81 mg by mouth Daily., Disp: , Rfl:   •  atorvastatin (LIPITOR) 80 MG tablet, atorvastatin 80 mg tablet  TAKE 1 TABLET BY MOUTH EVERY DAY, Disp: , Rfl:   •  cetirizine (zyrTEC) 10 MG tablet, Take 10 mg by mouth Daily., Disp: , Rfl:   •  HYDROcodone-acetaminophen (NORCO) 7.5-325 MG per tablet, 5 (Five) Times a Day., Disp: , Rfl:   •  lisinopril (PRINIVIL,ZESTRIL) 10 MG tablet, Take 10 mg by mouth Every Evening., Disp: , Rfl:   •  metoprolol succinate XL (TOPROL-XL) 50 MG 24 hr tablet,  Take 1 tablet by mouth 2 (Two) Times a Day., Disp: 60 tablet, Rfl: 3  •  naproxen (NAPROSYN) 500 MG tablet, Take 500 mg by mouth 2 (Two) Times a Day With Meals., Disp: , Rfl:   •  SUMAtriptan (IMITREX) 6 MG/0.5ML injection, Inject prescribed dose at onset of headache. May repeat dose one time in 1 hour(s) if headache not relieved., Disp: , Rfl:   •  topiramate (TOPAMAX) 50 MG tablet, Take 50 mg by mouth 2 (Two) Times a Day., Disp: , Rfl:   •  traMADol (ULTRAM) 50 MG tablet, 50 mg 2 (Two) Times a Day., Disp: , Rfl: 5  •  zolpidem (AMBIEN) 10 MG tablet, Take 10 mg by mouth At Night As Needed for Sleep., Disp: , Rfl:   •  hydroCHLOROthiazide (MICROZIDE) 12.5 MG capsule, Take 1 capsule by mouth Daily., Disp: 30 capsule, Rfl: 11  •  hydrOXYzine (ATARAX) 50 MG tablet, Take 1 tablet by mouth Every Night., Disp: 30 tablet, Rfl: 3    No Known Allergies      Past Medical History:   Diagnosis Date   • Anxiety    • Arthritis of back    • Atrial fibrillation (CMS/HCC)    • Chronic pain    • Depression    • H/O cluster headache    • Hearing loss    • Hyperlipidemia    • Hypertension    • Insomnia    • Periarthritis of shoulder    • Seasonal allergies      Past Surgical History:   Procedure Laterality Date   • BACK SURGERY     • CARDIAC ABLATION  07/31/2019   • EYE MUSCLE SURGERY Left    • HAND SURGERY Right     open fracture plate   • HARDWARE REMOVAL Right     rt hand  plate   • HERNIA REPAIR     • NASAL FRACTURE SURGERY     • SHOULDER ARTHROSCOPY W/ ROTATOR CUFF REPAIR Right 3/13/2019    Procedure: SHOULDER ARTHROSCOPY, DEBRIDEIMENT OF LABRUM AND SUBSCAP, DECEOMPRESSION, DISTAL CLAVICLE EXCISION,  WITH MINI OPEN ROTATOR CUFF REPAIR;  Surgeon: Estela Townsend MD;  Location: [facility] OR [facility];  Service: Orthopedics   • SHOULDER SURGERY     • SPINE SURGERY     • VASECTOMY       Family History   Problem Relation Age of Onset   • Hypertension Other    • Heart disease Other    • Lung disease Other    • Emphysema Mother    •  "Hypertension Mother    • Heart disease Mother    • Hypertension Sister    • No Known Problems Brother    • Heart attack Maternal Grandmother    • Emphysema Maternal Grandmother    • Heart attack Maternal Grandfather    • Emphysema Maternal Grandfather    • Malig Hyperthermia Neg Hx      Social History     Tobacco Use   • Smoking status: Current Every Day Smoker     Packs/day: 1.50     Years: 30.00     Pack years: 45.00     Types: Cigarettes   • Smokeless tobacco: Current User     Types: Snuff   Substance Use Topics   • Alcohol use: Not Currently     Alcohol/week: 16.0 standard drinks     Types: 16 Cans of beer per week       ROS:    General: no recent weight loss/gain, weakness or fatigue  Skin: no rashes, lumps, or other skin changes  HEENT: no dizziness, lightheadedness, or vision changes  Respiratory: no cough or hemoptysis  Cardiovascular: + palpitations, and tachycardia before the ablation  Gastrointestinal: no black/tarry stools or diarrhea  Urinary: no change in frequency or urgency  Peripheral Vascular: no claudication or leg cramps  Musculoskeletal: no muscle or joint pain/stiffness  Psychiatric: no depression or excessive stress  Neurological: no sensory or motor loss, no syncope. + Headaches  Hematologic: no anemia, easy bruising or bleeding  Endocrine: no thyroid problems, nor heat or cold intolerance       PHYSICAL EXAM:   /100 (BP Location: Right arm, Patient Position: Sitting)   Pulse 77   Ht 177.8 cm (70\")   Wt 97.5 kg (215 lb)   SpO2 97%   BMI 30.85 kg/m²      Wt Readings from Last 5 Encounters:   01/23/20 97.5 kg (215 lb)   12/06/19 96.2 kg (212 lb)   09/20/19 96.4 kg (212 lb 7 oz)   08/30/19 97.1 kg (214 lb)   08/26/19 99.8 kg (220 lb)     BP Readings from Last 5 Encounters:   01/23/20 151/100   10/25/19 155/90   09/20/19 135/76   08/30/19 135/79   08/27/19 140/89       General-Well Nourished, Well developed  Eyes - PERRLA  Neck- supple, No mass  CV- regular rate and rhythm, no " MRG  Lung- clear bilaterally  Abd- soft, +BS  Musc/skel - Norm strength and range of motion  Skin- warm and dry  Neuro - Alert & Oriented x 3, appropriate mood.    Medical problems and test results were reviewed with the patient today.     Results for orders placed or performed during the hospital encounter of 08/26/19   Troponin   Result Value Ref Range    Troponin T <0.010 0.000 - 0.030 ng/mL   Comprehensive Metabolic Panel   Result Value Ref Range    Glucose 106 (H) 65 - 99 mg/dL    BUN 12 6 - 20 mg/dL    Creatinine 0.89 0.76 - 1.27 mg/dL    Sodium 140 136 - 145 mmol/L    Potassium 3.8 3.5 - 5.2 mmol/L    Chloride 102 98 - 107 mmol/L    CO2 23.0 22.0 - 29.0 mmol/L    Calcium 9.5 8.6 - 10.5 mg/dL    Total Protein 7.6 6.0 - 8.5 g/dL    Albumin 4.90 3.50 - 5.20 g/dL    ALT (SGPT) 40 1 - 41 U/L    AST (SGOT) 31 1 - 40 U/L    Alkaline Phosphatase 107 39 - 117 U/L    Total Bilirubin 0.7 0.2 - 1.2 mg/dL    eGFR Non African Amer 90 >60 mL/min/1.73    Globulin 2.7 gm/dL    A/G Ratio 1.8 g/dL    BUN/Creatinine Ratio 13.5 7.0 - 25.0    Anion Gap 15.0 5.0 - 15.0 mmol/L   Lipase   Result Value Ref Range    Lipase 35 13 - 60 U/L   BNP   Result Value Ref Range    proBNP 59.7 5.0 - 900.0 pg/mL   CBC Auto Differential   Result Value Ref Range    WBC 8.60 3.40 - 10.80 10*3/mm3    RBC 4.89 4.14 - 5.80 10*6/mm3    Hemoglobin 15.3 13.0 - 17.7 g/dL    Hematocrit 45.1 37.5 - 51.0 %    MCV 92.2 79.0 - 97.0 fL    MCH 31.3 26.6 - 33.0 pg    MCHC 33.9 31.5 - 35.7 g/dL    RDW 11.4 (L) 12.3 - 15.4 %    RDW-SD 38.5 37.0 - 54.0 fl    MPV 10.0 6.0 - 12.0 fL    Platelets 256 140 - 450 10*3/mm3    Neutrophil % 69.8 42.7 - 76.0 %    Lymphocyte % 18.5 (L) 19.6 - 45.3 %    Monocyte % 9.1 5.0 - 12.0 %    Eosinophil % 1.6 0.3 - 6.2 %    Basophil % 0.5 0.0 - 1.5 %    Immature Grans % 0.5 0.0 - 0.5 %    Neutrophils, Absolute 6.01 1.70 - 7.00 10*3/mm3    Lymphocytes, Absolute 1.59 0.70 - 3.10 10*3/mm3    Monocytes, Absolute 0.78 0.10 - 0.90 10*3/mm3     Eosinophils, Absolute 0.14 0.00 - 0.40 10*3/mm3    Basophils, Absolute 0.04 0.00 - 0.20 10*3/mm3    Immature Grans, Absolute 0.04 0.00 - 0.05 10*3/mm3    nRBC 0.0 0.0 - 0.2 /100 WBC   Troponin   Result Value Ref Range    Troponin T <0.010 0.000 - 0.030 ng/mL   Light Blue Top   Result Value Ref Range    Extra Tube hold for add-on    Green Top (Gel)   Result Value Ref Range    Extra Tube Hold for add-ons.    Lavender Top   Result Value Ref Range    Extra Tube hold for add-on    Gold Top - SST   Result Value Ref Range    Extra Tube Hold for add-ons.          No results found for: CHOL, HDL, HDLC, LDL, LDLC, VLDL    EKG:  (EKG/Tracing has been independently visualized by me and summarized below)      ECG 12 Lead  Date/Time: 1/23/2020 10:04 AM  Performed by: Jeevan Sotomayor MD  Authorized by: Jeevan Sotomayor MD   Comparison: compared with previous ECG   Similar to previous ECG  Rhythm: sinus rhythm  Rate: normal  Conduction: conduction normal  ST Segments: ST segments normal  T Waves: T waves normal    Clinical impression: normal ECG            ASSESSMENT and PLAN  1. A. Fib - Post Ablation - done at [facility]. Is stabilizing. Off Anti coagulation. Consider a monitor if the future if Palp return.   2. CP - Has now resolved after Carafate treatment  3. HTN - Add HCTZ 12.5mg qam  4. ? Sleep Apnea - Sleep Study planned    Return in about 3 months (around 4/23/2020).            Jeevan Sotomayor M.D., F.A.C.C, F.H.R.S.  Cardiology/Electrophysiology  01/23/20  10:04 AM

## 2020-02-28 ENCOUNTER — APPOINTMENT (OUTPATIENT)
Dept: SLEEP MEDICINE | Facility: HOSPITAL | Age: 55
End: 2020-02-28

## 2020-05-11 RX ORDER — HYDROXYZINE 50 MG/1
50 TABLET, FILM COATED ORAL EVERY EVENING
Qty: 30 TABLET | Refills: 0 | Status: SHIPPED | OUTPATIENT
Start: 2020-05-11 | End: 2021-04-02

## 2020-12-03 NOTE — TELEPHONE ENCOUNTER
I SPOKE WITH YASMINE FROM [place], HE SAID THE PT'S PIP IS EXHAUSTED, I ASK HIM TO SEND ME AN EXHAUST LETTER FOR THE CHART,LLR

## 2021-04-02 ENCOUNTER — OFFICE VISIT (OUTPATIENT)
Dept: CARDIOLOGY | Facility: CLINIC | Age: 56
End: 2021-04-02

## 2021-04-02 ENCOUNTER — TELEPHONE (OUTPATIENT)
Dept: CARDIOLOGY | Facility: CLINIC | Age: 56
End: 2021-04-02

## 2021-04-02 ENCOUNTER — HOSPITAL ENCOUNTER (OUTPATIENT)
Dept: CARDIOLOGY | Facility: HOSPITAL | Age: 56
Discharge: HOME | End: 2021-04-02
Admitting: INTERNAL MEDICINE

## 2021-04-02 VITALS
HEART RATE: 60 BPM | DIASTOLIC BLOOD PRESSURE: 80 MMHG | SYSTOLIC BLOOD PRESSURE: 140 MMHG | BODY MASS INDEX: 30.06 KG/M2 | WEIGHT: 210 LBS | HEIGHT: 70 IN

## 2021-04-02 DIAGNOSIS — R00.2 PALPITATIONS: ICD-10-CM

## 2021-04-02 DIAGNOSIS — R00.2 PALPITATIONS: Primary | ICD-10-CM

## 2021-04-02 DIAGNOSIS — I48.0 PAF (PAROXYSMAL ATRIAL FIBRILLATION) (HCC): Primary | ICD-10-CM

## 2021-04-02 DIAGNOSIS — R06.09 DOE (DYSPNEA ON EXERTION): ICD-10-CM

## 2021-04-02 DIAGNOSIS — R07.2 PRECORDIAL PAIN: ICD-10-CM

## 2021-04-02 DIAGNOSIS — I10 ESSENTIAL HYPERTENSION: ICD-10-CM

## 2021-04-02 DIAGNOSIS — I48.0 PAF (PAROXYSMAL ATRIAL FIBRILLATION) (HCC): ICD-10-CM

## 2021-04-02 LAB — TROPONIN T SERPL-MCNC: <0.01 NG/ML (ref 0–0.03)

## 2021-04-02 PROCEDURE — 93000 ELECTROCARDIOGRAM COMPLETE: CPT | Performed by: INTERNAL MEDICINE

## 2021-04-02 PROCEDURE — 36415 COLL VENOUS BLD VENIPUNCTURE: CPT

## 2021-04-02 PROCEDURE — 99215 OFFICE O/P EST HI 40 MIN: CPT | Performed by: INTERNAL MEDICINE

## 2021-04-02 PROCEDURE — 84484 ASSAY OF TROPONIN QUANT: CPT | Performed by: INTERNAL MEDICINE

## 2021-04-02 RX ORDER — LISINOPRIL 10 MG/1
10 TABLET ORAL 2 TIMES DAILY
Qty: 60 TABLET | Refills: 6 | Status: SHIPPED | OUTPATIENT
Start: 2021-04-02

## 2021-04-02 RX ORDER — GALCANEZUMAB 100 MG/ML
INJECTION, SOLUTION SUBCUTANEOUS AS NEEDED
COMMUNITY
Start: 2021-02-11

## 2021-04-02 RX ORDER — EZETIMIBE 10 MG/1
10 TABLET ORAL
COMMUNITY
Start: 2021-03-19

## 2021-04-02 NOTE — PROGRESS NOTES
"Date of Office Visit: 2021  Encounter Provider: Sherron Cedillo MD  Place of Service: [facility] CARDIOLOGY  Patient Name: Colby Alvarado  :1965    Chief complaint  Patient is referred by Dr. Simons for evaluation of chest pain, shortness of breath, palpitations    History of Present Illness  Patient is a 55-year-old gentleman with history of hypertension, hyperlipidemia, obstructive sleep apnea (on CPAP) and paroxysmal atrial fibrillation.  In 2019 he underwent pulmonary vein ablation Eliquis was continued for 3 months.  Patient had an echocardiogram at the same time that showed normal left ventricular systolic function with mild left ventricular hypertrophy \"abnormal systolic strain pattern\" was noted though civics were not provided.  On 120 he was seen by Dr. Duran for chest pain hypertension and sleep disorder following the ablation.  Chest pain resolved in 2019 after taking Carafate.  He was started on hydrochlorothiazide for hypertension subsequently found to have obstructive sleep apnea.    He has a very hectic life as a maintenance.  He just moved to the [place] area and hopes to have easy work schedule.  He is wearing his CPAP consistently but only sleeping 4 hours.  He states his blood pressure typically is in the 140s on occasion in the 170s often he takes an extra Toprol.  He states he had relief from palpitations up until past 6 months he has had them recur occurring twice a week lasting for up to an hour at times.  He often takes an extra blood pressure pill and this resolves.  He has also developed chest discomfort in the last 7 months.  He states it is different from the discomfort he had following ablation it is more in the left upper chest and radiates to his arm it is quite severe time he had a bad episode last night.  He states this can occur typically in the midday to evening hours when he is more active.  It is not typically " associated with palpitations.  He does have some relief with taking an extra Toprol.    No recent labs available on file though patient states his lipids were checked by Dr. Mo several months ago and these had improved though still elevated.  He also states kidney tests and potassium levels he knows were normal.    Past Medical History:   Diagnosis Date   • Anxiety    • Arthritis of back    • Atrial fibrillation (CMS/HCC)    • Chronic pain    • Depression    • H/O cluster headache    • Hearing loss    • Hyperlipidemia    • Hypertension    • Insomnia    • Periarthritis of shoulder    • Seasonal allergies    • Sleep apnea     using CPAP     Past Surgical History:   Procedure Laterality Date   • BACK SURGERY     • CARDIAC ABLATION  07/31/2019   • EYE MUSCLE SURGERY Left    • HAND SURGERY Right     open fracture plate   • HARDWARE REMOVAL Right     rt hand  plate   • HERNIA REPAIR     • NASAL FRACTURE SURGERY     • SHOULDER ARTHROSCOPY W/ ROTATOR CUFF REPAIR Right 3/13/2019    Procedure: SHOULDER ARTHROSCOPY, DEBRIDEIMENT OF LABRUM AND SUBSCAP, DECEOMPRESSION, DISTAL CLAVICLE EXCISION,  WITH MINI OPEN ROTATOR CUFF REPAIR;  Surgeon: Estela Townsend MD;  Location: [facility] OR [facility];  Service: Orthopedics   • SHOULDER SURGERY     • SPINE SURGERY     • VASECTOMY       Outpatient Medications Prior to Visit   Medication Sig Dispense Refill   • atorvastatin (LIPITOR) 80 MG tablet atorvastatin 80 mg tablet   TAKE 1 TABLET BY MOUTH EVERY DAY     • cetirizine (zyrTEC) 10 MG tablet Take 10 mg by mouth Daily.     • ezetimibe (ZETIA) 10 MG tablet Take 10 mg by mouth every night at bedtime.     • Galcanezumab-gnlm (Emgality, 300 MG Dose,) 100 MG/ML solution prefilled syringe Emgality 300 mg/3 mL (100 mg/mL x 3) subcutaneous syringe     • HYDROcodone-acetaminophen (NORCO) 7.5-325 MG per tablet 5 (Five) Times a Day.     • metoprolol succinate XL (TOPROL-XL) 50 MG 24 hr tablet Take 1 tablet by mouth 2 (Two) Times a Day. 60 tablet 3      • naproxen (NAPROSYN) 500 MG tablet Take 500 mg by mouth 2 (Two) Times a Day With Meals.     • SUMAtriptan (IMITREX) 6 MG/0.5ML injection Inject prescribed dose at onset of headache. May repeat dose one time in 1 hour(s) if headache not relieved.     • zolpidem (AMBIEN) 10 MG tablet Take 10 mg by mouth At Night As Needed for Sleep.     • lisinopril (PRINIVIL,ZESTRIL) 10 MG tablet Take 10 mg by mouth Every Evening.     • aspirin 81 MG EC tablet Take 81 mg by mouth Daily.     • hydroCHLOROthiazide (MICROZIDE) 12.5 MG capsule Take 1 capsule by mouth Daily. 30 capsule 11   • hydrOXYzine (ATARAX) 50 MG tablet Take 1 tablet by mouth Every Evening. Patient needs to make an appt for any additional refills. 30 tablet 0   • topiramate (TOPAMAX) 50 MG tablet Take 50 mg by mouth 2 (Two) Times a Day.     • traMADol (ULTRAM) 50 MG tablet 50 mg 2 (Two) Times a Day.  5     No facility-administered medications prior to visit.       Allergies as of 04/02/2021   • (No Known Allergies)     Social History     Socioeconomic History   • Marital status: Significant Other     Spouse name: Not on file   • Number of children: Not on file   • Years of education: Not on file   • Highest education level: Not on file   Tobacco Use   • Smoking status: Former Smoker     Packs/day: 1.50     Years: 30.00     Pack years: 45.00     Types: Cigarettes     Start date: 02/2021   • Smokeless tobacco: Former User     Types: Snuff   Substance and Sexual Activity   • Alcohol use: Not Currently     Alcohol/week: 16.0 standard drinks     Types: 16 Cans of beer per week     Comment: Stopped 12/2021   • Drug use: No   • Sexual activity: Defer     Family History   Problem Relation Age of Onset   • Hypertension Other    • Heart disease Other    • Lung disease Other    • Emphysema Mother    • Hypertension Mother    • Heart disease Mother    • Cancer Mother         Oral   • Hypertension Sister    • No Known Problems Brother    • Heart attack Maternal Grandmother   "  • Emphysema Maternal Grandmother    • Heart disease Maternal Grandmother    • Stroke Maternal Grandmother    • Heart attack Maternal Grandfather    • Emphysema Maternal Grandfather    • Malig Hyperthermia Neg Hx      Review of Systems   Constitutional: Negative for chills, fever, weight gain and weight loss.   Cardiovascular: Positive for chest pain, dyspnea on exertion and palpitations. Negative for leg swelling.   Respiratory: Positive for snoring. Negative for cough and wheezing.    Hematologic/Lymphatic: Negative for bleeding problem. Does not bruise/bleed easily.   Skin: Negative for color change.   Musculoskeletal: Positive for joint pain and myalgias. Negative for falls.   Gastrointestinal: Negative for melena.   Genitourinary: Negative for hematuria.   Neurological: Positive for excessive daytime sleepiness.   Psychiatric/Behavioral: Negative for depression. The patient is not nervous/anxious.         Objective:     Vitals:    04/02/21 0807 04/02/21 0809   BP: 136/78 140/80   BP Location: Right arm Left arm   Pulse: 60    Weight: 95.3 kg (210 lb)    Height: 177.8 cm (70\")      Body mass index is 30.13 kg/m².    Vitals reviewed.   Constitutional:       Appearance: Well-developed.      Comments: Obese   Eyes:      General: No scleral icterus.        Right eye: No discharge.      Conjunctiva/sclera: Conjunctivae normal.      Pupils: Pupils are equal, round, and reactive to light.   HENT:      Head: Normocephalic.      Nose: Nose normal.   Neck:      Thyroid: No thyromegaly.      Vascular: No JVD.   Pulmonary:      Effort: Pulmonary effort is normal. No respiratory distress.      Breath sounds: Normal breath sounds. No wheezing. No rales.   Cardiovascular:      Normal rate. Regular rhythm. Normal S1. Normal S2.      Murmurs: There is no murmur.      No gallop.   Pulses:     Intact distal pulses.   Edema:     Peripheral edema absent.   Abdominal:      General: Bowel sounds are normal. There is no distension.    "   Palpations: Abdomen is soft.      Tenderness: There is no abdominal tenderness. There is no rebound.   Musculoskeletal: Normal range of motion.         General: No tenderness.      Cervical back: Normal range of motion and neck supple. Skin:     General: Skin is warm and dry.      Findings: No erythema or rash.   Neurological:      Mental Status: Alert and oriented to person, place, and time.   Psychiatric:         Behavior: Behavior normal.         Thought Content: Thought content normal.         Judgment: Judgment normal.       Lab Review:     ECG 12 Lead    Date/Time: 4/2/2021 8:12 AM  Performed by: Sherron Cedillo MD  Authorized by: Sherron Cedillo MD   Comparison: compared with previous ECG   Similar to previous ECG  Rhythm: sinus rhythm    Clinical impression: normal ECG          Assessment:       Diagnosis Plan   1. PAF (paroxysmal atrial fibrillation) (CMS/HCC)  ECG 12 Lead    Holter Monitor - 72 Hour Up To 15 Days   2. PRYOR (dyspnea on exertion)  ECG 12 Lead   3. Palpitations  ECG 12 Lead    Holter Monitor - 72 Hour Up To 15 Days    Adult Transthoracic Echo Complete W/ Cont if Necessary Per Protocol    Stress Test With Myocardial Perfusion One Day   4. Precordial pain  Troponin    Adult Transthoracic Echo Complete W/ Cont if Necessary Per Protocol    Stress Test With Myocardial Perfusion One Day     Plan:       1.  Chest pain.  Has multiple possible etiologies.  Has anginal components and multiple risk factors and just recently stopped smoking.  We will check a troponin and chest x-ray today.  We will check an echocardiogram especially as he had previously noted to have abnormal strain.  In addition we will check an exercise Cardiolite stress test. Will address blood pressure further.  Eventually will need further follow-up regarding possible GI etiology and also needs to see neurosurgery for cervical disc disease which may also contribute to chest pain  2.  Abnormal LV strain noted on prior echo.  Reassess by  echocardiography  3.  Hypertension.  Will increase lisinopril to 10 mg twice daily.  He will continue with the current dose of metoprolol 25 mg once a day  4.  Hyperlipidemia.  Try to get outside labs  5.  Paroxysmal atrial fibrillation, status post pulmonary vein ablation in 7/19.  With residual palpitations we will check a 2-week Zio patch especially as these episodes last up to 2 hours in duration  6.  Nicotine use.  Stopped smoking in February.  I strongly recommended he continue abstinence  7.  History migraine headaches, on sumatriptan  8.  Obstructive sleep apnea on CPAP.  May need reassessment with ongoing snoring and daytime somnolence  9.  Significant cervical disc disease.  Neurology had made recommendations to refer to neurosurgery this has not occurred.  I have asked him to contact neurology/neurosurgery to arrange further evaluation.    I spent a total 60 minutes spent including reviewing records with 45 minutes of face to face time with this patient.    Addendum: Troponin is normal.        Your medication list          Accurate as of April 2, 2021 11:59 PM. If you have any questions, ask your nurse or doctor.            CHANGE how you take these medications      Instructions Last Dose Given Next Dose Due   lisinopril 10 MG tablet  Commonly known as: PRINIVIL,ZESTRIL  What changed: when to take this  Changed by: Sherron Cedillo MD      Take 1 tablet by mouth 2 (two) times a day.          CONTINUE taking these medications      Instructions Last Dose Given Next Dose Due   Ambien 10 MG tablet  Generic drug: zolpidem      Take 10 mg by mouth At Night As Needed for Sleep.       atorvastatin 80 MG tablet  Commonly known as: LIPITOR      atorvastatin 80 mg tablet   TAKE 1 TABLET BY MOUTH EVERY DAY       cetirizine 10 MG tablet  Commonly known as: zyrTEC      Take 10 mg by mouth Daily.       Emgality (300 MG Dose) 100 MG/ML solution prefilled syringe  Generic drug: Galcanezumab-gnlm      Emgality 300 mg/3 mL (100  mg/mL x 3) subcutaneous syringe       ezetimibe 10 MG tablet  Commonly known as: ZETIA      Take 10 mg by mouth every night at bedtime.       HYDROcodone-acetaminophen 7.5-325 MG per tablet  Commonly known as: NORCO      5 (Five) Times a Day.       metoprolol succinate XL 50 MG 24 hr tablet  Commonly known as: TOPROL-XL      Take 1 tablet by mouth 2 (Two) Times a Day.       naproxen 500 MG tablet  Commonly known as: NAPROSYN      Take 500 mg by mouth 2 (Two) Times a Day With Meals.       SUMAtriptan 6 MG/0.5ML injection  Commonly known as: IMITREX      Inject prescribed dose at onset of headache. May repeat dose one time in 1 hour(s) if headache not relieved.          STOP taking these medications    aspirin 81 MG EC tablet  Stopped by: Sherron Cedillo MD        hydroCHLOROthiazide 12.5 MG capsule  Commonly known as: MICROZIDE  Stopped by: Sherron Cedillo MD        hydrOXYzine 50 MG tablet  Commonly known as: ATARAX  Stopped by: Sherron Cedillo MD        topiramate 50 MG tablet  Commonly known as: TOPAMAX  Stopped by: Sherron Cedillo MD        traMADol 50 MG tablet  Commonly known as: ULTRAM  Stopped by: Sherron Cedillo MD              Where to Get Your Medications      These medications were sent to [id number] - [place], [place] - [address]  [address] [facility] - [phone]  - [phone]   [address] [facility] [address]    Phone: [phone]   · lisinopril 10 MG tablet         Patient is no longer taking aspirin, Microzide, Atarax, Topamax, Ultram.  I corrected the med list to reflect this.  I did not stop these medications.    Dictated utilizing Dragon dictation

## 2021-04-04 PROBLEM — R06.09 DOE (DYSPNEA ON EXERTION): Status: ACTIVE | Noted: 2021-04-04

## 2021-04-04 PROBLEM — R07.2 PRECORDIAL PAIN: Status: ACTIVE | Noted: 2021-04-04

## 2021-04-04 PROBLEM — R00.2 PALPITATIONS: Status: ACTIVE | Noted: 2021-04-04

## 2021-04-29 ENCOUNTER — HOSPITAL ENCOUNTER (OUTPATIENT)
Dept: CARDIOLOGY | Facility: HOSPITAL | Age: 56
Discharge: HOME | End: 2021-04-29

## 2021-04-29 ENCOUNTER — TELEPHONE (OUTPATIENT)
Dept: CARDIOLOGY | Facility: CLINIC | Age: 56
End: 2021-04-29

## 2021-04-29 VITALS
HEIGHT: 70 IN | WEIGHT: 210 LBS | SYSTOLIC BLOOD PRESSURE: 140 MMHG | HEART RATE: 68 BPM | DIASTOLIC BLOOD PRESSURE: 80 MMHG | BODY MASS INDEX: 30.06 KG/M2

## 2021-04-29 DIAGNOSIS — R00.2 PALPITATIONS: ICD-10-CM

## 2021-04-29 DIAGNOSIS — R07.2 PRECORDIAL PAIN: ICD-10-CM

## 2021-04-29 LAB
BH CV REST NUCLEAR ISOTOPE DOSE: 11.5 MCI
BH CV STRESS BP STAGE 1: NORMAL
BH CV STRESS BP STAGE 2: NORMAL
BH CV STRESS BP STAGE 3: NORMAL
BH CV STRESS DURATION MIN STAGE 1: 3
BH CV STRESS DURATION MIN STAGE 2: 3
BH CV STRESS DURATION MIN STAGE 3: 3
BH CV STRESS DURATION MIN STAGE 4: 1
BH CV STRESS DURATION SEC STAGE 1: 0
BH CV STRESS DURATION SEC STAGE 2: 0
BH CV STRESS DURATION SEC STAGE 3: 0
BH CV STRESS DURATION SEC STAGE 4: 30
BH CV STRESS GRADE STAGE 1: 10
BH CV STRESS GRADE STAGE 2: 12
BH CV STRESS GRADE STAGE 3: 14
BH CV STRESS GRADE STAGE 4: 16
BH CV STRESS HR STAGE 1: 92
BH CV STRESS HR STAGE 2: 110
BH CV STRESS HR STAGE 3: 138
BH CV STRESS HR STAGE 4: 154
BH CV STRESS METS STAGE 1: 5
BH CV STRESS METS STAGE 2: 7.5
BH CV STRESS METS STAGE 3: 10
BH CV STRESS METS STAGE 4: 12
BH CV STRESS NUCLEAR ISOTOPE DOSE: 35.6 MCI
BH CV STRESS PROTOCOL 1: NORMAL
BH CV STRESS RECOVERY BP: NORMAL MMHG
BH CV STRESS RECOVERY HR: 84 BPM
BH CV STRESS SPEED STAGE 1: 1.7
BH CV STRESS SPEED STAGE 2: 2.5
BH CV STRESS SPEED STAGE 3: 3.4
BH CV STRESS SPEED STAGE 4: 4.2
BH CV STRESS STAGE 1: 1
BH CV STRESS STAGE 2: 2
BH CV STRESS STAGE 3: 3
BH CV STRESS STAGE 4: 4
LV EF NUC BP: 64 %
MAXIMAL PREDICTED HEART RATE: 167 BPM
PERCENT MAX PREDICTED HR: 92.22 %
STRESS BASELINE BP: NORMAL MMHG
STRESS BASELINE HR: 74 BPM
STRESS PERCENT HR: 108 %
STRESS POST ESTIMATED WORKLOAD: 12 METS
STRESS POST EXERCISE DUR MIN: 10 MIN
STRESS POST EXERCISE DUR SEC: 30 SEC
STRESS POST PEAK BP: NORMAL MMHG
STRESS POST PEAK HR: 154 BPM
STRESS TARGET HR: 142 BPM

## 2021-04-29 PROCEDURE — 93306 TTE W/DOPPLER COMPLETE: CPT

## 2021-04-29 PROCEDURE — 93018 CV STRESS TEST I&R ONLY: CPT | Performed by: INTERNAL MEDICINE

## 2021-04-29 PROCEDURE — 0 TECHNETIUM TETROFOSMIN KIT: Performed by: INTERNAL MEDICINE

## 2021-04-29 PROCEDURE — 93356 MYOCRD STRAIN IMG SPCKL TRCK: CPT

## 2021-04-29 PROCEDURE — 93017 CV STRESS TEST TRACING ONLY: CPT

## 2021-04-29 PROCEDURE — 93016 CV STRESS TEST SUPVJ ONLY: CPT | Performed by: INTERNAL MEDICINE

## 2021-04-29 PROCEDURE — 78452 HT MUSCLE IMAGE SPECT MULT: CPT

## 2021-04-29 PROCEDURE — 93306 TTE W/DOPPLER COMPLETE: CPT | Performed by: INTERNAL MEDICINE

## 2021-04-29 PROCEDURE — A9502 TC99M TETROFOSMIN: HCPCS | Performed by: INTERNAL MEDICINE

## 2021-04-29 PROCEDURE — 78452 HT MUSCLE IMAGE SPECT MULT: CPT | Performed by: INTERNAL MEDICINE

## 2021-04-29 PROCEDURE — 93356 MYOCRD STRAIN IMG SPCKL TRCK: CPT | Performed by: INTERNAL MEDICINE

## 2021-04-29 RX ADMIN — TETROFOSMIN 1 DOSE: 1.38 INJECTION, POWDER, LYOPHILIZED, FOR SOLUTION INTRAVENOUS at 09:00

## 2021-04-29 RX ADMIN — TETROFOSMIN 1 DOSE: 1.38 INJECTION, POWDER, LYOPHILIZED, FOR SOLUTION INTRAVENOUS at 09:41

## 2021-05-04 LAB
ASCENDING AORTA: 3.6 CM
BH CV ECHO MEAS - ACS: 2.3 CM
BH CV ECHO MEAS - AO MAX PG (FULL): 4.2 MMHG
BH CV ECHO MEAS - AO MAX PG: 9.4 MMHG
BH CV ECHO MEAS - AO MEAN PG (FULL): 3 MMHG
BH CV ECHO MEAS - AO MEAN PG: 5 MMHG
BH CV ECHO MEAS - AO ROOT AREA (BSA CORRECTED): 1.8
BH CV ECHO MEAS - AO ROOT AREA: 11.9 CM^2
BH CV ECHO MEAS - AO ROOT DIAM: 3.9 CM
BH CV ECHO MEAS - AO V2 MAX: 153 CM/SEC
BH CV ECHO MEAS - AO V2 MEAN: 106 CM/SEC
BH CV ECHO MEAS - AO V2 VTI: 35 CM
BH CV ECHO MEAS - ASC AORTA: 3.6 CM
BH CV ECHO MEAS - BSA(HAYCOCK): 2.2 M^2
BH CV ECHO MEAS - BSA: 2.1 M^2
BH CV ECHO MEAS - BZI_BMI: 30.1 KILOGRAMS/M^2
BH CV ECHO MEAS - BZI_METRIC_HEIGHT: 177.8 CM
BH CV ECHO MEAS - BZI_METRIC_WEIGHT: 95.3 KG
BH CV ECHO MEAS - EDV(MOD-SP2): 84 ML
BH CV ECHO MEAS - EDV(MOD-SP4): 85 ML
BH CV ECHO MEAS - EDV(TEICH): 147.4 ML
BH CV ECHO MEAS - EF(CUBED): 74.2 %
BH CV ECHO MEAS - EF(MOD-BP): 55.9 %
BH CV ECHO MEAS - EF(MOD-SP2): 51.2 %
BH CV ECHO MEAS - EF(MOD-SP4): 60 %
BH CV ECHO MEAS - EF(TEICH): 65.5 %
BH CV ECHO MEAS - ESV(MOD-SP2): 41 ML
BH CV ECHO MEAS - ESV(MOD-SP4): 34 ML
BH CV ECHO MEAS - ESV(TEICH): 50.9 ML
BH CV ECHO MEAS - FS: 36.4 %
BH CV ECHO MEAS - IVS/LVPW: 1
BH CV ECHO MEAS - IVSD: 1.1 CM
BH CV ECHO MEAS - LAT PEAK E' VEL: 10.6 CM/SEC
BH CV ECHO MEAS - LV DIASTOLIC VOL/BSA (35-75): 39.9 ML/M^2
BH CV ECHO MEAS - LV MASS(C)D: 242 GRAMS
BH CV ECHO MEAS - LV MASS(C)DI: 113.6 GRAMS/M^2
BH CV ECHO MEAS - LV MAX PG: 5.2 MMHG
BH CV ECHO MEAS - LV MEAN PG: 2 MMHG
BH CV ECHO MEAS - LV SYSTOLIC VOL/BSA (12-30): 16 ML/M^2
BH CV ECHO MEAS - LV V1 MAX: 114 CM/SEC
BH CV ECHO MEAS - LV V1 MEAN: 63.3 CM/SEC
BH CV ECHO MEAS - LV V1 VTI: 21.5 CM
BH CV ECHO MEAS - LVIDD: 5.5 CM
BH CV ECHO MEAS - LVIDS: 3.5 CM
BH CV ECHO MEAS - LVLD AP2: 8.4 CM
BH CV ECHO MEAS - LVLD AP4: 7.8 CM
BH CV ECHO MEAS - LVLS AP2: 7.3 CM
BH CV ECHO MEAS - LVLS AP4: 6.7 CM
BH CV ECHO MEAS - LVOT DIAM: 2 CM
BH CV ECHO MEAS - LVPWD: 1.1 CM
BH CV ECHO MEAS - MED PEAK E' VEL: 10.6 CM/SEC
BH CV ECHO MEAS - MR MAX PG: 59.6 MMHG
BH CV ECHO MEAS - MR MAX VEL: 386 CM/SEC
BH CV ECHO MEAS - MV A DUR: 0.18 SEC
BH CV ECHO MEAS - MV A MAX VEL: 57.4 CM/SEC
BH CV ECHO MEAS - MV DEC SLOPE: 374 CM/SEC^2
BH CV ECHO MEAS - MV DEC TIME: 0.2 SEC
BH CV ECHO MEAS - MV E MAX VEL: 90 CM/SEC
BH CV ECHO MEAS - MV E/A: 1.6
BH CV ECHO MEAS - MV MAX PG: 5.7 MMHG
BH CV ECHO MEAS - MV MEAN PG: 2 MMHG
BH CV ECHO MEAS - MV P1/2T MAX VEL: 89.5 CM/SEC
BH CV ECHO MEAS - MV P1/2T: 70.1 MSEC
BH CV ECHO MEAS - MV V2 MAX: 119 CM/SEC
BH CV ECHO MEAS - MV V2 MEAN: 67.5 CM/SEC
BH CV ECHO MEAS - MV V2 VTI: 32.3 CM
BH CV ECHO MEAS - MVA P1/2T LCG: 2.5 CM^2
BH CV ECHO MEAS - MVA(P1/2T): 3.1 CM^2
BH CV ECHO MEAS - PA MAX PG (FULL): 3 MMHG
BH CV ECHO MEAS - PA MAX PG: 4.2 MMHG
BH CV ECHO MEAS - PA V2 MAX: 103 CM/SEC
BH CV ECHO MEAS - PULM A REVS DUR: 0.12 SEC
BH CV ECHO MEAS - PULM A REVS VEL: 20.5 CM/SEC
BH CV ECHO MEAS - PULM DIAS VEL: 78.6 CM/SEC
BH CV ECHO MEAS - PULM S/D: 0.66
BH CV ECHO MEAS - PULM SYS VEL: 51.9 CM/SEC
BH CV ECHO MEAS - PVA(V,A): 2.5 CM^2
BH CV ECHO MEAS - PVA(V,D): 2.5 CM^2
BH CV ECHO MEAS - RV MAX PG: 1.3 MMHG
BH CV ECHO MEAS - RV MEAN PG: 1 MMHG
BH CV ECHO MEAS - RV V1 MAX: 56.8 CM/SEC
BH CV ECHO MEAS - RV V1 MEAN: 38.3 CM/SEC
BH CV ECHO MEAS - RV V1 VTI: 16 CM
BH CV ECHO MEAS - RVOT AREA: 4.5 CM^2
BH CV ECHO MEAS - RVOT DIAM: 2.4 CM
BH CV ECHO MEAS - SI(AO): 196.2 ML/M^2
BH CV ECHO MEAS - SI(CUBED): 58 ML/M^2
BH CV ECHO MEAS - SI(MOD-SP2): 20.2 ML/M^2
BH CV ECHO MEAS - SI(MOD-SP4): 23.9 ML/M^2
BH CV ECHO MEAS - SI(TEICH): 45.3 ML/M^2
BH CV ECHO MEAS - SUP REN AO DIAM: 1.9 CM
BH CV ECHO MEAS - SV(AO): 418.1 ML
BH CV ECHO MEAS - SV(CUBED): 123.5 ML
BH CV ECHO MEAS - SV(MOD-SP2): 43 ML
BH CV ECHO MEAS - SV(MOD-SP4): 51 ML
BH CV ECHO MEAS - SV(RVOT): 72.4 ML
BH CV ECHO MEAS - SV(TEICH): 96.6 ML
BH CV ECHO MEAS - TAPSE (>1.6): 2.8 CM
BH CV ECHO MEASUREMENTS AVERAGE E/E' RATIO: 8.49
BH CV XLRA - RV BASE: 3.4 CM
BH CV XLRA - RV LENGTH: 8.2 CM
BH CV XLRA - RV MID: 2.5 CM
BH CV XLRA - TDI S': 12 CM/SEC
LEFT ATRIUM VOLUME INDEX: 19 ML/M2
LV EF 2D ECHO EST: 56 %
MAXIMAL PREDICTED HEART RATE: 167 BPM
SINUS: 3.6 CM
STJ: 3.4 CM
STRESS TARGET HR: 142 BPM

## 2021-05-05 NOTE — TELEPHONE ENCOUNTER
"Patient notified of results and verbalized understanding    Pt stated he has not had any chest pain, he reports feeling well and his bp is below 130/80 on average.    He also wanted me to let you know that he has seen neurology and they are working him up for neuropathy and \"injections in his neck\"  Shannon Madrid RN  Triage nurse    "

## 2021-08-12 ENCOUNTER — TELEPHONE (OUTPATIENT)
Dept: CARDIOLOGY | Facility: CLINIC | Age: 56
End: 2021-08-12

## 2021-08-12 NOTE — TELEPHONE ENCOUNTER
Please let patient know that monitor study looks normal, no evidence of recurrent atrial fibrillation.  Would really avoid stimulants including alcohol, caffeine, dark chocolate, Sudafed.  Would monitor blood pressure at home to ensure it is well controlled and call if staying greater than 130/80 on average.    Looks like he missed his follow-up appointment with me in July.  Would get him an appointment to see me within the next month or so, but call sooner for any cardiac symptoms or concerns.

## 2021-08-17 ENCOUNTER — TELEPHONE (OUTPATIENT)
Dept: CARDIOLOGY | Facility: CLINIC | Age: 56
End: 2021-08-17

## 2021-08-17 NOTE — TELEPHONE ENCOUNTER
Patient notified of results and verbalized understanding    Scheduling please call and schedule follow up, pt is overdue  Shannon Madrid RN  Triage nurse

## 2021-12-07 ENCOUNTER — IMMUNIZATION (OUTPATIENT)
Dept: VACCINE CLINIC | Facility: HOSPITAL | Age: 56
End: 2021-12-07

## 2021-12-07 PROCEDURE — 91300 HC SARSCOV02 VAC 30MCG/0.3ML IM: CPT | Performed by: INTERNAL MEDICINE

## 2021-12-07 PROCEDURE — 0004A HC ADM SARSCOV2 30MCG/0.3ML BOOSTER: CPT | Performed by: INTERNAL MEDICINE

## 2022-02-23 ENCOUNTER — TELEPHONE (OUTPATIENT)
Dept: CARDIOLOGY | Facility: CLINIC | Age: 57
End: 2022-02-23

## 2022-02-23 NOTE — TELEPHONE ENCOUNTER
Patient reports he experienced a-fib on Monday, reports he had palpitations for 20 hours and his HR got up to 99. He described his heart fluttering, but denied any additional symptoms. States he was diaphoretic but works in a hot environment. Reports that he left work 2 hours early, and had relief after showering and laying down. He states that he has not experienced this since he had an ablation on 7/13/2019.    Patient requested an appointment and was scheduled for 2/24/22 at 0930.     Let me know if you have any additional recommendations.     Thanks,   Elaine Betts, BSN, RN  Triage Nurse [facility]

## 2022-02-24 ENCOUNTER — OFFICE VISIT (OUTPATIENT)
Dept: CARDIOLOGY | Facility: CLINIC | Age: 57
End: 2022-02-24

## 2022-02-24 VITALS
BODY MASS INDEX: 32.5 KG/M2 | SYSTOLIC BLOOD PRESSURE: 130 MMHG | HEART RATE: 76 BPM | HEIGHT: 70 IN | DIASTOLIC BLOOD PRESSURE: 86 MMHG | WEIGHT: 227 LBS

## 2022-02-24 DIAGNOSIS — I10 ESSENTIAL HYPERTENSION: ICD-10-CM

## 2022-02-24 DIAGNOSIS — I48.0 PAF (PAROXYSMAL ATRIAL FIBRILLATION): Primary | ICD-10-CM

## 2022-02-24 DIAGNOSIS — E78.2 HYPERLIPEMIA, MIXED: ICD-10-CM

## 2022-02-24 PROCEDURE — 93000 ELECTROCARDIOGRAM COMPLETE: CPT | Performed by: NURSE PRACTITIONER

## 2022-02-24 PROCEDURE — 99214 OFFICE O/P EST MOD 30 MIN: CPT | Performed by: NURSE PRACTITIONER

## 2022-02-24 RX ORDER — METOPROLOL SUCCINATE 100 MG/1
100 TABLET, EXTENDED RELEASE ORAL DAILY
Qty: 90 TABLET | Refills: 1 | Status: SHIPPED | OUTPATIENT
Start: 2022-02-24 | End: 2022-07-18

## 2022-02-24 NOTE — PROGRESS NOTES
[facility]   [address], [address]  [place], [place], [address]    [phone]  [email or web address]           OUTPATIENT CLINIC FOLLOW UP NOTE    Patient Care Team:  Patient Care Team:  Asif Simons MD as PCP - General (General Practice)    Subjective:      Chief Complaint   Patient presents with   • Atrial Fibrillation       HPI:    Colby Alvarado is a 56 y.o. male.  Problem list:  1. Paroxysmal atrial fibrillation  a. Status post pulmonary vein ablation in 7/2019. Was continued on Eliquis for 3 months at that time.  b. TTE 7/2019: Normal left ventricular systolic function with mild left ventricular hypertrophy.  2. Hypertension  3. Hyperlipidemia  4. Obstructive sleep apnea    The patient presents today for follow-up. His primary cardiologist is Dr. Cedillo.    Since the patient was last seen he reports that been doing well from a cardiac standpoint until the last several weeks.  He feels his palpitations have become more frequent.  He had one episode that lasted approximately 20 hours that improved with rest and an additional metoprolol.  Otherwise his palpitations have been brief in nature and occur once every few days.  He notes he has not been using his CPAP due to the recall.  He is also drinking excessive amounts of caffeine and sleeping poorly.  He denies shortness of breath, lower extremity edema, lightheadedness, syncope, or chest pain.    Review of Systems:  Positive for palpitations  Negative for exertional chest pain, dyspnea with exertion, lower extremity edema, syncope.     PFSH:  Patient Active Problem List   Diagnosis   • Complete tear of right rotator cuff   • PAF (paroxysmal atrial fibrillation) (HCC)   • Essential hypertension   • PRYOR (dyspnea on exertion)   • Palpitations   • Precordial pain         Current Outpatient Medications:   •  atorvastatin (LIPITOR) 80 MG tablet, Take 80 mg by mouth Every Night., Disp: , Rfl:   •  cetirizine (zyrTEC) 10 MG tablet,  "Take 10 mg by mouth Daily., Disp: , Rfl:   •  ezetimibe (ZETIA) 10 MG tablet, Take 10 mg by mouth every night at bedtime., Disp: , Rfl:   •  Galcanezumab-gnlm (Emgality, 300 MG Dose,) 100 MG/ML solution prefilled syringe, As Needed., Disp: , Rfl:   •  HYDROcodone-acetaminophen (NORCO) 7.5-325 MG per tablet, 5 (Five) Times a Day., Disp: , Rfl:   •  lisinopril (PRINIVIL,ZESTRIL) 10 MG tablet, Take 1 tablet by mouth 2 (two) times a day., Disp: 60 tablet, Rfl: 6  •  metoprolol succinate XL (TOPROL-XL) 100 MG 24 hr tablet, Take 1 tablet by mouth Daily. May take an additional 50 mg as needed., Disp: 90 tablet, Rfl: 1  •  naproxen (NAPROSYN) 500 MG tablet, Take 500 mg by mouth Daily., Disp: , Rfl:   •  SUMAtriptan (IMITREX) 6 MG/0.5ML injection, Inject prescribed dose at onset of headache. May repeat dose one time in 1 hour(s) if headache not relieved., Disp: , Rfl:   •  zolpidem (AMBIEN) 10 MG tablet, Take 10 mg by mouth At Night As Needed for Sleep., Disp: , Rfl:     No Known Allergies     reports that he has quit smoking. His smoking use included cigarettes. He started smoking about 12 months ago. He has a 45.00 pack-year smoking history. He has quit using smokeless tobacco.  His smokeless tobacco use included snuff.      Objective:   Physical exam:  /86   Pulse 76   Ht 177.8 cm (70\")   Wt 103 kg (227 lb)   BMI 32.57 kg/m²   CONSTITUTIONAL: No acute distress  RESPIRATORY: Normal effort. Clear to auscultation bilaterally without wheezing or rales  CARDIOVASCULAR: Carotids with normal upstrokes without bruits.  Regular rate and rhythm with normal S1 and S2. Without murmur, gallop or rub. Normal radial pulse. There is no lower extremity edema bilaterally.  Posterior tibial pulses are 2+ bilaterally.    Labs:    BUN   Date Value Ref Range Status   08/26/2019 12 6 - 20 mg/dL Final     Creatinine   Date Value Ref Range Status   08/26/2019 0.89 0.76 - 1.27 mg/dL Final     Potassium   Date Value Ref Range Status "   08/26/2019 3.8 3.5 - 5.2 mmol/L Final     ALT (SGPT)   Date Value Ref Range Status   08/26/2019 40 1 - 41 U/L Final     AST (SGOT)   Date Value Ref Range Status   08/26/2019 31 1 - 40 U/L Final       No results found for: CHOL  No results found for: TRIG  No results found for: HDL  No results found for: LDL  No components found for: LDLDIRECTC    Diagnostic Data:      ECG 12 Lead    Date/Time: 2/24/2022 10:50 AM  Performed by: Caitie Blankenship APRN  Authorized by: Caitie Blankenship APRN   Comparison: compared with previous ECG from 4/2/2021  Similar to previous ECG  Rhythm: sinus rhythm  Rate: normal  BPM: 76  Comments: QRS 92 ms,  ms            Results for orders placed during the hospital encounter of 04/29/21    Adult Transthoracic Echo Complete W/ Cont if Necessary Per Protocol    Interpretation Summary  · Calculated left ventricular EF = 55.9% Estimated left ventricular EF = 56% Estimated left ventricular EF was in agreement with the calculated left ventricular EF. Left ventricular systolic function is normal. Normal left ventricular cavity size and wall thickness noted. All left ventricular wall segments contract normally. Left ventricular diastolic function was normal.  · No aortic valve regurgitation or stenosis is present. The aortic valve is abnormal in structure. There is mild calcification of the aortic valve  · Trace mitral valve regurgitation is present.  · Addendum: Global longitudinal LV strain is normal      Assessment and Plan:   Diagnoses and all orders for this visit:    PAF (paroxysmal atrial fibrillation) (HCC) (Primary)  -Status post pulmonary vein ablation in 2019.  -Patient has had increased palpitations recently.  He is not using his CPAP due to the recall and has been drinking excessive amounts of caffeine.  -Patient encouraged to decrease stimulant use and resume CPAP use when able.  -Patient has been taking Toprol-XL 50 mg twice daily most days of the week.  We will formally  increase prescription to 100 mg p.o. daily.  May take an additional 50 mg of metoprolol as needed for significant palpitations.    Essential hypertension  -At goal, continue Toprol as above and lisinopril.    Hyperlipemia, mixed  -Routine lipid panel per PCP.  -Continue lovastatin and Zetia.    Obstructive sleep apnea  -Patient to continue to follow with sleep medicine.  -Patient to resume CPAP use when able.  Patient CPAP has currently been recalled and he has not received a replacement.    - Return in about 6 months (around 8/24/2022) for Next scheduled follow up with Dr. Cedillo.    Electronically signed by LUIS EDUARDO Albarado, 02/24/22, 10:55 AM ELENI.

## 2022-03-10 ENCOUNTER — PREP FOR SURGERY (OUTPATIENT)
Dept: SURGERY | Facility: SURGERY CENTER | Age: 57
End: 2022-03-10

## 2022-03-10 ENCOUNTER — OFFICE VISIT (OUTPATIENT)
Dept: PAIN MEDICINE | Facility: CLINIC | Age: 57
End: 2022-03-10

## 2022-03-10 VITALS
DIASTOLIC BLOOD PRESSURE: 77 MMHG | BODY MASS INDEX: 31.75 KG/M2 | OXYGEN SATURATION: 93 % | WEIGHT: 221.8 LBS | SYSTOLIC BLOOD PRESSURE: 170 MMHG | TEMPERATURE: 96.2 F | HEIGHT: 70 IN | HEART RATE: 77 BPM

## 2022-03-10 DIAGNOSIS — M54.12 CERVICAL RADICULOPATHY: ICD-10-CM

## 2022-03-10 DIAGNOSIS — M50.20 DISPLACEMENT OF CERVICAL INTERVERTEBRAL DISC WITHOUT MYELOPATHY: Primary | ICD-10-CM

## 2022-03-10 DIAGNOSIS — M50.30 DDD (DEGENERATIVE DISC DISEASE), CERVICAL: Primary | ICD-10-CM

## 2022-03-10 PROCEDURE — 99214 OFFICE O/P EST MOD 30 MIN: CPT | Performed by: NURSE PRACTITIONER

## 2022-03-10 RX ORDER — SODIUM CHLORIDE 0.9 % (FLUSH) 0.9 %
10 SYRINGE (ML) INJECTION EVERY 12 HOURS SCHEDULED
Status: CANCELLED | OUTPATIENT
Start: 2022-03-10

## 2022-03-10 RX ORDER — SODIUM CHLORIDE 0.9 % (FLUSH) 0.9 %
10 SYRINGE (ML) INJECTION AS NEEDED
Status: CANCELLED | OUTPATIENT
Start: 2022-03-10

## 2022-03-10 NOTE — PATIENT INSTRUCTIONS
---  Indications for epidural injection:  Plan is to proceed with epidural at the appropriate level.  If the patient receives significant pain reduction and improvement in function and the plan will be to repeat the epidural when the pain worsens.  If a second epidural provides at least 6 weeks of sustained improvement that includes both pain reduction and improvement in function then an epidural injection could be repeated once again at the same level.  This is a mutual decision between the clinician and the patient that includes discussions including risks and benefits in detail as well as alternative therapies.  Patient's questions were answered to their satisfaction and to their understanding.  ---

## 2022-03-10 NOTE — PROGRESS NOTES
CHIEF COMPLAINT  Patient c/o neck pain that he has had for about 3.5 years. He describes the pain as aching that radiates into his bilateral shoulders and arms. He has some tingling, numbness and weakness. He is currently prescribed Norco which does improve his pain. He has not had PT for his neck. He has had epidurals in the past which did improve his pain.     Subjective   Colby Alvarado is a 56 y.o. male.   He presents to the office for evaluation of neck pain. He was referred here by NINO Osborn ([facility]).     Patient presents today reporting chronic neck pain for about the past 3-1/2 years.  It has become progressively worse over the past year or so.  In terms of conservative treatment he has tried medications (hydrocodone, OTC medication, muscle relaxants) as well as epidural injections.  Chart review shows the patient had 3 cervical epidural steroid injections in 2019 at [facility].  He does recall that these were quite helpful, was unable to proceed with more due to insurance loss.  He has been evaluated by [facility].  No current neurosurgical recommendations.  Recommending interventional pain management.    Patient states that his pain today is a 6 out of 10 in severity VAS.  The location of the pain is in the center of the neck and radiates into bilateral shoulders and both arms. There is numbness and tingling of both arms as well. Hand symptoms present also, but being treated for carpal tunnel.  Pain is aggravated by work, he is quite physical. Improves with pain medication (prescribed hydrocodone by pcp) and modifying activity.      Work -  for [employer].  Denies current use of tobacco products. Denies illicit drug use.  Drinks beer daily.      Relevant Surgical history - bilateral rotator cuff surgery (right 2019, left 2018), Lumbar spine surgery about 15 years ago Dr. Moon    History of Present Illness     PEG Assessment    What number best describes your pain on average in the past week?8  What number best describes how, during the past week, pain has interfered with your enjoyment of life?0  What number best describes how, during the past week, pain has interfered with your general activity?  7      Current Outpatient Medications:   •  atorvastatin (LIPITOR) 80 MG tablet, Take 80 mg by mouth Every Night., Disp: , Rfl:   •  cetirizine (zyrTEC) 10 MG tablet, Take 10 mg by mouth Daily., Disp: , Rfl:   •  ezetimibe (ZETIA) 10 MG tablet, Take 10 mg by mouth every night at bedtime., Disp: , Rfl:   •  Galcanezumab-gnlm (Emgality, 300 MG Dose,) 100 MG/ML solution prefilled syringe, As Needed., Disp: , Rfl:   •  HYDROcodone-acetaminophen (NORCO) 7.5-325 MG per tablet, 5 (Five) Times a Day., Disp: , Rfl:   •  lisinopril (PRINIVIL,ZESTRIL) 10 MG tablet, Take 1 tablet by mouth 2 (two) times a day., Disp: 60 tablet, Rfl: 6  •  metoprolol succinate XL (TOPROL-XL) 100 MG 24 hr tablet, Take 1 tablet by mouth Daily. May take an additional 50 mg as needed., Disp: 90 tablet, Rfl: 1  •  naproxen (NAPROSYN) 500 MG tablet, Take 500 mg by mouth Daily., Disp: , Rfl:   •  SUMAtriptan (IMITREX) 6 MG/0.5ML injection, Inject prescribed dose at onset of headache. May repeat dose one time in 1 hour(s) if headache not relieved., Disp: , Rfl:   •  zolpidem (AMBIEN) 10 MG tablet, Take 10 mg by mouth At Night As Needed for Sleep., Disp: , Rfl:     The following portions of the patient's history were reviewed and updated as appropriate: allergies, current medications, past family history, past medical history, past social history, past surgical history and problem list.    REVIEW OF PERTINENT MEDICAL DATA    Reviewed Jerilyn Anderson's note 1/18/2022.  MRI showing multilevel degenerative changes as well as spinal stenosis and foraminal narrowing most significantly at C4-5 and C6-7.  Recommending interventional pain management.  Also had an EMG showing bilateral carpal  "tunnel.  Saw Dr. Liz for this and injections were helpful.        Review of Systems   Constitutional: Positive for activity change (decreased) and fatigue. Negative for chills and fever.   HENT: Negative for congestion.    Eyes: Negative for visual disturbance.   Respiratory: Positive for shortness of breath (r/t Afib). Negative for chest tightness.    Cardiovascular: Negative for chest pain.   Gastrointestinal: Negative for abdominal pain, constipation and diarrhea.   Genitourinary: Positive for frequency. Negative for difficulty urinating and dysuria.   Musculoskeletal: Positive for neck pain and neck stiffness.   Neurological: Positive for weakness, light-headedness, numbness and headaches (cluster migraines). Negative for dizziness.   Psychiatric/Behavioral: Positive for agitation and sleep disturbance. Negative for self-injury and suicidal ideas. The patient is nervous/anxious.          Vitals:    03/10/22 0857   BP: 170/77   Pulse: 77   Temp: 96.2 °F (35.7 °C)   SpO2: 93%   Weight: 101 kg (221 lb 12.8 oz)   Height: 177.8 cm (70\")   PainSc:   6   PainLoc: Neck         Objective   Physical Exam  Vitals and nursing note reviewed.   Constitutional:       General: He is not in acute distress.     Appearance: Normal appearance. He is not ill-appearing.   Cardiovascular:      Rate and Rhythm: Normal rate.   Pulmonary:      Effort: Pulmonary effort is normal. No respiratory distress.   Musculoskeletal:      Cervical back: Tenderness and bony tenderness present. Pain with movement present.      Comments: +spurling bilaterally    Skin:     General: Skin is warm and dry.   Neurological:      Mental Status: He is alert and oriented to person, place, and time.      Motor: Motor function is intact. No weakness.      Gait: Gait normal.   Psychiatric:         Mood and Affect: Mood normal.         Behavior: Behavior normal.         Assessment/Plan   Diagnoses and all orders for this visit:    1. Displacement of cervical " intervertebral disc without myelopathy (Primary)    2. Cervical radiculopathy        --- Cervical GISSELL x 2, 4 weeks apart     ---  Indications for epidural injection:  Plan is to proceed with epidural at the appropriate level.  If the patient receives significant pain reduction and improvement in function and the plan will be to repeat the epidural when the pain worsens.  If a second epidural provides at least 6 weeks of sustained improvement that includes both pain reduction and improvement in function then an epidural injection could be repeated once again at the same level.  This is a mutual decision between the clinician and the patient that includes discussions including risks and benefits in detail as well as alternative therapies.  Patient's questions were answered to their satisfaction and to their understanding.  ---    --- Follow-up for procedure            As the clinician, I personally reviewed the CARI from 3/10/2022 while the patient was in the office today.     Dictated utilizing Dragon dictation.     This document is intended for medical expert use only. Reading of this document by patients and/or patient's family without participating medical staff guidance may result in misinterpretation and unintended morbidity.   Any interpretation of such data is the responsibility of the patient and/or family member responsible for the patient in concert with their primary or specialist providers, not to be left for sources of online searches such as [employer], [employer] or similar queries. Relying on these approaches to knowledge may result in misinterpretation, misguided goals of care and even death should patients or family members try recommendations outside of the realm of professional medical care in a supervised way.

## 2022-03-21 ENCOUNTER — TELEPHONE (OUTPATIENT)
Dept: PAIN MEDICINE | Facility: CLINIC | Age: 57
End: 2022-03-21

## 2022-03-21 NOTE — TELEPHONE ENCOUNTER
Caller: JOSE DENNY    Relationship to patient: SELF    Best call back number:     Chief complaint: BACK PAIN     Type of visit: FUP     Requested date: 3/22/22     Additional notes:PATIENT JUST GOT OUT OF ER TODAY AND THEY ADVISED HIM TO FOLLOW UP WITH BACK DOCTOR TOMORROW OR WEDNESDAY.  HE HAD IMAGING DONE AND

## 2022-03-22 ENCOUNTER — TRANSCRIBE ORDERS (OUTPATIENT)
Dept: SURGERY | Facility: SURGERY CENTER | Age: 57
End: 2022-03-22

## 2022-03-22 DIAGNOSIS — Z41.9 SURGERY, ELECTIVE: Primary | ICD-10-CM

## 2022-03-22 PROBLEM — M50.30 DDD (DEGENERATIVE DISC DISEASE), CERVICAL: Status: ACTIVE | Noted: 2022-03-22

## 2022-03-22 PROBLEM — M54.12 CERVICAL RADICULOPATHY: Status: ACTIVE | Noted: 2022-03-22

## 2022-03-22 NOTE — TELEPHONE ENCOUNTER
Can we get records? I am guessing they were referring to neurosurgery/ortho spine, not pain management... he has seen [facility] recently.

## 2022-03-25 NOTE — TELEPHONE ENCOUNTER
Regarding his neck he is scheduled for a SHAY in just a little over a week. I would recommend proceeding with this. We can see if there are any spots available next week in order to do it a little sooner.

## 2022-03-25 NOTE — TELEPHONE ENCOUNTER
I spoke with Mr. Alvarado and he states that he has seen neurosurgery about his back and has been sent to PT to help resolve that issue. He is not needing to be seen about his back just his neck. He went to [facility].

## 2022-04-01 RX ORDER — CYCLOBENZAPRINE HCL 10 MG
10 TABLET ORAL 2 TIMES DAILY PRN
COMMUNITY
End: 2022-05-19 | Stop reason: HOSPADM

## 2022-04-01 RX ORDER — PREDNISONE 10 MG/1
10 TABLET ORAL 2 TIMES DAILY
COMMUNITY
End: 2022-05-19 | Stop reason: HOSPADM

## 2022-04-01 NOTE — SIGNIFICANT NOTE
Patient educated on the following :    - If you are receiving Sedation for your procedure Nothing to Eat 6 hours and only clear liquids for 2 hours prior to your procedure.     -You will need to have someone drive you home after your PROCEDURE and remain with you for 24 hours after the PROCEDURE  - The date of your procedure, your are welcome to have one visitor at bedside or remain within 10-15 minutes of [facility]  -You will need to arrive at 1045 on 4/5/22 PROCEDURE  -Please contact [facility] at: [phone] with any questions and/or concerns

## 2022-04-05 ENCOUNTER — HOSPITAL ENCOUNTER (OUTPATIENT)
Dept: GENERAL RADIOLOGY | Facility: SURGERY CENTER | Age: 57
Setting detail: HOSPITAL OUTPATIENT SURGERY
End: 2022-04-05

## 2022-04-05 ENCOUNTER — HOSPITAL ENCOUNTER (OUTPATIENT)
Facility: SURGERY CENTER | Age: 57
Setting detail: HOSPITAL OUTPATIENT SURGERY
Discharge: HOME | End: 2022-04-05
Attending: ANESTHESIOLOGY | Admitting: ANESTHESIOLOGY

## 2022-04-05 VITALS
SYSTOLIC BLOOD PRESSURE: 151 MMHG | HEART RATE: 86 BPM | OXYGEN SATURATION: 95 % | DIASTOLIC BLOOD PRESSURE: 85 MMHG | RESPIRATION RATE: 16 BRPM | TEMPERATURE: 97.3 F

## 2022-04-05 DIAGNOSIS — M54.12 CERVICAL RADICULOPATHY: ICD-10-CM

## 2022-04-05 DIAGNOSIS — Z41.9 SURGERY, ELECTIVE: ICD-10-CM

## 2022-04-05 DIAGNOSIS — M50.30 DDD (DEGENERATIVE DISC DISEASE), CERVICAL: ICD-10-CM

## 2022-04-05 PROCEDURE — 76000 FLUOROSCOPY <1 HR PHYS/QHP: CPT

## 2022-04-05 PROCEDURE — 25010000002 METHYLPREDNISOLONE PER 80 MG: Performed by: ANESTHESIOLOGY

## 2022-04-05 PROCEDURE — 62321 NJX INTERLAMINAR CRV/THRC: CPT | Performed by: ANESTHESIOLOGY

## 2022-04-05 PROCEDURE — 77002 NEEDLE LOCALIZATION BY XRAY: CPT

## 2022-04-05 PROCEDURE — 0 IOHEXOL 300 MG/ML SOLUTION 10 ML VIAL: Performed by: ANESTHESIOLOGY

## 2022-04-05 RX ORDER — SODIUM CHLORIDE 0.9 % (FLUSH) 0.9 %
10 SYRINGE (ML) INJECTION AS NEEDED
Status: DISCONTINUED | OUTPATIENT
Start: 2022-04-05 | End: 2022-04-05 | Stop reason: HOSPADM

## 2022-04-05 RX ORDER — SODIUM CHLORIDE 0.9 % (FLUSH) 0.9 %
10 SYRINGE (ML) INJECTION EVERY 12 HOURS SCHEDULED
Status: DISCONTINUED | OUTPATIENT
Start: 2022-04-05 | End: 2022-04-05 | Stop reason: HOSPADM

## 2022-04-05 RX ORDER — SODIUM CHLORIDE, SODIUM LACTATE, POTASSIUM CHLORIDE, CALCIUM CHLORIDE 600; 310; 30; 20 MG/100ML; MG/100ML; MG/100ML; MG/100ML
20 INJECTION, SOLUTION INTRAVENOUS CONTINUOUS
Status: DISCONTINUED | OUTPATIENT
Start: 2022-04-05 | End: 2022-04-05 | Stop reason: HOSPADM

## 2022-04-05 RX ADMIN — SODIUM CHLORIDE, SODIUM LACTATE, POTASSIUM CHLORIDE, AND CALCIUM CHLORIDE 20 ML/HR: .6; .31; .03; .02 INJECTION, SOLUTION INTRAVENOUS at 11:09

## 2022-04-05 NOTE — DISCHARGE INSTRUCTIONS
[facility] - Post-procedure Instructions          --  While there are no absolute restrictions, it is recommended that you do not perform strenuous activity today. In the morning, you may resume your level of activity as before your block.    --  If you have a band-aid at your injection site, please remove it later today. Observe the area for any redness, swelling, pus-like drainage, or a temperature over 101°. If any of these symptoms occur, please call your doctor at [phone]. If after office hours, leave a message and the on-call provider will return your call.    --  Ice may be applied to your injection site. It is recommended you avoid direct heat (heating pad; hot tub) for 1-2 days.    --  Call [facility] at [phone] if you experience persistent headache, persistent bleeding from the injection site, or severe pain not relieved by heat or oral medication.    --  Do not make important decisions today.    --  Due to the effects of the block and/or the I.V. Sedation, DO NOT drive or operate hazardous machinery for 12 hours.  Local anesthetics may cause numbness after procedure and precautions must be taken with regards to operating equipment as well as with walking, even if ambulating with assistance of another person or with an assistive device.    --  Do not drink alcohol for 12 hours.    -- You may return to work tomorrow, or as directed by your referring doctor.    --  Occasionally you may notice a slight increase in your pain after the procedure. This should start to improve within the next 24-48 hours. Radiofrequency ablation procedure pain may last 3-4 weeks.    --  It may take as long as 3-4 days before you notice a gradual improvement in your pain and/or other symptoms.    -- You may continue to take your prescribed pain medication as needed.    --  Some normal possible side effects of steroid use could include fluid retention, increased blood sugar, dull headache,  increased sweating, increased appetite, mood swings and flushing.    --  Diabetics are recommended to watch their blood glucose level closely for 24-48 hours after the injection.    --  Must stay in PACU for 20 min upon arrival and prove no leg weakness before being discharged.    --  IN THE EVENT OF A LIFE THREATENING EMERGENCY, (CHEST PAIN, BREATHING DIFFICULTIES, PARALYSIS…) YOU SHOULD GO TO YOUR NEAREST EMERGENCY ROOM.    --  You should be contacted by our office within 2-3 days to schedule follow up or next appointment date.  If not contacted within 7 days, please call the office at [phone]

## 2022-04-05 NOTE — OP NOTE
Cervical Epidural Steroid Injection @ C5-C6  [facility]    PREOPERATIVE DIAGNOSIS:  Cervical Spinal Stenosis and Bilateral Cervical Radiculopathy  POSTOPERATIVE DIAGNOSIS:  Same as preop diagnosis    PROCEDURE:   Cervical Epidural Steroid Injection, Therapeutic Translaminar Injection, with epidurogram, at  C5-C6 level    PRE-PROCEDURE DISCUSSION WITH PATIENT:    Risks and complications were discussed with the patient prior to starting the procedure and informed consent was obtained.  We discussed various topics including but not limited to bleeding, infection, injury, paralysis, nerve injury, dural puncture, coma, death, worsening of clinical picture, lack of pain relief, and postprocedural soreness.    SURGEON:  Cruz gAuayo MD    REASON FOR PROCEDURE:   Stenotic area is noted, and is likely contributing to this chronic &/or recurrent pain.  and Radiating pattern of pain is likely consistent with degenerative changes in the area.    SEDATION:  Patient declined administration of moderate sedation   and , but an IV access was obtained for safety.  ANESTHETIC:  Marcaine 0.125 %  STEROID:   Methylprednisolone (DEPO MEDROL) 80mg/ml    DESCRIPTON OF PROCEDURE:    After obtaining informed consent, I.V. was started in the preop area.   The patient was taken to the operating room and placed in the prone position.  EKG, blood pressure, and pulse oximeter were monitored throughout,  by the RN under my guidance. All pressure points were well padded.  The cervicothoracic spine area was prepped with Chloraprep and draped in a sterile fashion.  Under fluoroscopic guidance, the aforementioned interlaminar space was identified. Skin and subcutaneous tissues were anesthetized with 1% lidocaine in the middle of the space. A Tuohy needle was introduced through the skin and advanced to this interlaminar space and into the epidural space under fluoroscopic guidance and verified with loss-of-resistance technique  to air.  After confirming the position of the needle with the fluoroscope with all the views, and after aspiration was confirmed negative for blood and CSF, 1.5 mL of Omnipaque was injected.  After seeing appropriate epidurogram with lateral and PA views, a total of 2 cc solution was injected, consisting of 0.5cc of local anesthetic as above, with normal saline and injectable steroid as above.     ESTIMATED BLOOD LOSS:  <5 mL  SPECIMENS:  None    COMPLICATIONS:     No complications were noted., There was no indication of vascular uptake on live injection of contrast dye., There was no indication of intrathecal uptake on live injection of contrast dye., There was not any evidence of dural puncture.   and The patient did not have any signs of postprocedure numbness nor weakness.    TOLERANCE & DISCHARGE CONDITION:    The patient tolerated the procedure well.  The patient was transported to the recovery area without difficulties.  The patient was discharged to home under the care of family in stable and satisfactory condition.    PLAN OF CARE:  1. The patient was given our standard instruction sheet.  2. The patient will Return to clinic 2 wks.  3. The patient will resume all medications as per the medication reconciliation sheet.

## 2022-04-19 ENCOUNTER — TRANSCRIBE ORDERS (OUTPATIENT)
Dept: SURGERY | Facility: SURGERY CENTER | Age: 57
End: 2022-04-19

## 2022-04-19 ENCOUNTER — PREP FOR SURGERY (OUTPATIENT)
Dept: SURGERY | Facility: SURGERY CENTER | Age: 57
End: 2022-04-19

## 2022-04-19 ENCOUNTER — OFFICE VISIT (OUTPATIENT)
Dept: PAIN MEDICINE | Facility: CLINIC | Age: 57
End: 2022-04-19

## 2022-04-19 VITALS
HEART RATE: 92 BPM | HEIGHT: 70 IN | DIASTOLIC BLOOD PRESSURE: 76 MMHG | BODY MASS INDEX: 32.07 KG/M2 | SYSTOLIC BLOOD PRESSURE: 132 MMHG | WEIGHT: 224 LBS | TEMPERATURE: 96.2 F | OXYGEN SATURATION: 97 % | RESPIRATION RATE: 20 BRPM

## 2022-04-19 DIAGNOSIS — M50.30 DDD (DEGENERATIVE DISC DISEASE), CERVICAL: Primary | ICD-10-CM

## 2022-04-19 DIAGNOSIS — M54.12 CERVICAL RADICULOPATHY: ICD-10-CM

## 2022-04-19 DIAGNOSIS — Z41.9 SURGERY, ELECTIVE: Primary | ICD-10-CM

## 2022-04-19 DIAGNOSIS — M50.20 DISPLACEMENT OF CERVICAL INTERVERTEBRAL DISC WITHOUT MYELOPATHY: Primary | ICD-10-CM

## 2022-04-19 PROCEDURE — 99214 OFFICE O/P EST MOD 30 MIN: CPT | Performed by: NURSE PRACTITIONER

## 2022-04-19 RX ORDER — PREDNISONE 20 MG/1
20 TABLET ORAL DAILY
COMMUNITY
Start: 2022-03-21 | End: 2022-11-09 | Stop reason: SDUPTHER

## 2022-04-19 RX ORDER — SODIUM CHLORIDE 0.9 % (FLUSH) 0.9 %
10 SYRINGE (ML) INJECTION EVERY 12 HOURS SCHEDULED
Status: CANCELLED | OUTPATIENT
Start: 2022-04-19

## 2022-04-19 RX ORDER — SODIUM CHLORIDE 0.9 % (FLUSH) 0.9 %
10 SYRINGE (ML) INJECTION AS NEEDED
Status: CANCELLED | OUTPATIENT
Start: 2022-04-19

## 2022-04-19 RX ORDER — NAPROXEN 500 MG/1
500 TABLET ORAL
COMMUNITY
Start: 2022-03-21 | End: 2022-11-09 | Stop reason: SDUPTHER

## 2022-04-19 RX ORDER — HYDROCODONE BITARTRATE AND ACETAMINOPHEN 10; 325 MG/1; MG/1
1 TABLET ORAL
COMMUNITY
Start: 2022-04-13 | End: 2022-06-10 | Stop reason: SDUPTHER

## 2022-04-19 RX ORDER — CYCLOBENZAPRINE HCL 10 MG
10 TABLET ORAL
COMMUNITY
Start: 2022-03-29 | End: 2023-02-21 | Stop reason: SDUPTHER

## 2022-04-19 NOTE — PROGRESS NOTES
CHIEF COMPLAINT  F/u neck pain. Pt had CERVICAL EPIDURAL and sts receiving 80% relief from procedure so far.     Subjective   Colby Alvarado is a 56 y.o. male  who presents for follow-up.  He has a history of neck pain.    SHAY 4/5/2022 with Dr. Aguayo --- 80% relief for a couple of weeks, beginning to wane.  He is interested in repeating injection with goal of maximizing therapeutic benefit.     Recent flare of low back pain.  Saw neurosurgery (Coby Anderson, LUIS EDUARDO Raya).  Treated with steroid, muscle relaxant, naproxen, and pt.  Improving.      Patient remained masked during entire encounter. No cough present. I donned a mask and eye protection throughout entire visit. Prior to donning mask and eye protection, hand hygiene was performed, as well as when it was doffed.  I was closer than 6 feet, but not for an extended period of time. No obvious exposure to any bodily fluids.    Neck Pain   This is a chronic problem. The current episode started more than 1 year ago. The problem occurs daily. The problem has been waxing and waning. The pain is present in the left side, midline and right side. The quality of the pain is described as aching and burning. The pain is at a severity of 7/10. The symptoms are aggravated by position and twisting. Associated symptoms include numbness (bilat arms). Pertinent negatives include no chest pain, fever, headaches or weakness. He has tried NSAIDs, acetaminophen, neck support, muscle relaxants and oral narcotics for the symptoms. The treatment provided mild relief.      PEG Assessment   What number best describes your pain on average in the past week?6  What number best describes how, during the past week, pain has interfered with your enjoyment of life?6  What number best describes how, during the past week, pain has interfered with your general activity?  6    Review of Pertinent Medical Data ---          The following portions of the patient's history were reviewed and updated  "as appropriate: allergies, current medications, past family history, past medical history, past social history, past surgical history and problem list.    Review of Systems   Constitutional: Positive for fatigue (occ). Negative for activity change and fever.   HENT: Negative for congestion.    Respiratory: Negative for cough and shortness of breath.    Cardiovascular: Negative for chest pain.   Gastrointestinal: Negative for constipation and diarrhea.   Genitourinary: Negative for difficulty urinating.   Musculoskeletal: Positive for neck pain.   Neurological: Positive for numbness (bilat arms). Negative for dizziness, weakness, light-headedness and headaches.   Psychiatric/Behavioral: Negative for agitation, sleep disturbance and suicidal ideas. The patient is not nervous/anxious.      I have reviewed and confirmed the accuracy of the ROS as documented by the MA/LPN/RN LUIS EDUARDO Mendoza    Vitals:    04/19/22 0818   BP: 132/76   Pulse: 92   Resp: 20   Temp: 96.2 °F (35.7 °C)   SpO2: 97%   Weight: 102 kg (224 lb)   Height: 177.8 cm (70\")   PainSc:   7   PainLoc: Neck         Objective   Physical Exam  Vitals and nursing note reviewed.   Constitutional:       General: He is not in acute distress.     Appearance: Normal appearance. He is not ill-appearing.   Pulmonary:      Effort: Pulmonary effort is normal. No respiratory distress.   Musculoskeletal:      Cervical back: Tenderness and bony tenderness present. Pain with movement present.      Comments: +spurling bilaterally    Skin:     General: Skin is warm and dry.   Neurological:      Mental Status: He is alert and oriented to person, place, and time.      Motor: Motor function is intact. No weakness.      Gait: Gait normal.   Psychiatric:         Mood and Affect: Mood normal.         Behavior: Behavior normal.       Assessment/Plan   Diagnoses and all orders for this visit:    1. Displacement of cervical intervertebral disc without myelopathy " (Primary)    2. Cervical radiculopathy      --- SHAY    ---  Indications for epidural injection:  Plan is to proceed with epidural at the appropriate level.  If the patient receives significant pain reduction and improvement in function and the plan will be to repeat the epidural when the pain worsens.  If a second epidural provides at least 6 weeks of sustained improvement that includes both pain reduction and improvement in function then an epidural injection could be repeated once again at the same level.  This is a mutual decision between the clinician and the patient that includes discussions including risks and benefits in detail as well as alternative therapies.  Patient's questions were answered to their satisfaction and to their understanding.  ---    --- He asked me after the office visit about prescribing his pain medication which is currently managed by his pcp. If we consider this in the future we will need to obtain uds, csa, and perform an opioid risk assessment. He understands.   --- Follow-up for procedure          As the clinician, I personally reviewed the CARI from 4/19/2022 while the patient was in the office today.    This document is intended for medical expert use only. Reading of this document by patients and/or patient's family without participating medical staff guidance may result in misinterpretation and unintended morbidity.   Any interpretation of such data is the responsibility of the patient and/or family member responsible for the patient in concert with their primary or specialist providers, not to be left for sources of online searches such as [employer], [employer] or similar queries. Relying on these approaches to knowledge may result in misinterpretation, misguided goals of care and even death should patients or family members try recommendations outside of the realm of professional medical care in a supervised way.

## 2022-05-17 NOTE — SIGNIFICANT NOTE
Patient educated on the following :    - If you are receiving Sedation for your procedure Nothing to Eat 6 hours and only clear liquids for 2 hours prior to your procedure.    -Your required COVID Test is Scheduled on  Between the Hours of [id number]  -You will only be notified if your COVID test Result is POSITIVE  -The importance of reducing your number of contacts by self quarantining after you COVID test until the date of your PROCEDURE  -You will need to have someone drive you home after your PROCEDURE and remain with you for 24 hours after the PROCEDURE  - The date of your procedure, your are welcome to have one visitor at bedside or remain within 10-15 minutes of [employer]  -You will need to arrive at 0730 on 5/19/22 PROCEDURE  -Please contact [employer] [facility] at: [phone] with any questions and/or concerns

## 2022-05-19 ENCOUNTER — HOSPITAL ENCOUNTER (OUTPATIENT)
Dept: GENERAL RADIOLOGY | Facility: SURGERY CENTER | Age: 57
Setting detail: HOSPITAL OUTPATIENT SURGERY
End: 2022-05-19

## 2022-05-19 ENCOUNTER — HOSPITAL ENCOUNTER (OUTPATIENT)
Facility: SURGERY CENTER | Age: 57
Setting detail: HOSPITAL OUTPATIENT SURGERY
Discharge: HOME | End: 2022-05-19
Attending: ANESTHESIOLOGY | Admitting: ANESTHESIOLOGY

## 2022-05-19 VITALS
BODY MASS INDEX: 32.07 KG/M2 | DIASTOLIC BLOOD PRESSURE: 73 MMHG | HEART RATE: 78 BPM | SYSTOLIC BLOOD PRESSURE: 138 MMHG | WEIGHT: 224 LBS | OXYGEN SATURATION: 96 % | RESPIRATION RATE: 20 BRPM | HEIGHT: 70 IN | TEMPERATURE: 98.2 F

## 2022-05-19 DIAGNOSIS — Z41.9 SURGERY, ELECTIVE: ICD-10-CM

## 2022-05-19 DIAGNOSIS — M50.30 DDD (DEGENERATIVE DISC DISEASE), CERVICAL: ICD-10-CM

## 2022-05-19 DIAGNOSIS — M54.12 CERVICAL RADICULOPATHY: ICD-10-CM

## 2022-05-19 PROCEDURE — 76000 FLUOROSCOPY <1 HR PHYS/QHP: CPT

## 2022-05-19 PROCEDURE — 62321 NJX INTERLAMINAR CRV/THRC: CPT | Performed by: ANESTHESIOLOGY

## 2022-05-19 PROCEDURE — 0 IOHEXOL 300 MG/ML SOLUTION 10 ML VIAL: Performed by: ANESTHESIOLOGY

## 2022-05-19 PROCEDURE — 77002 NEEDLE LOCALIZATION BY XRAY: CPT

## 2022-05-19 PROCEDURE — 25010000002 METHYLPREDNISOLONE PER 80 MG: Performed by: ANESTHESIOLOGY

## 2022-05-19 RX ORDER — SODIUM CHLORIDE, SODIUM LACTATE, POTASSIUM CHLORIDE, CALCIUM CHLORIDE 600; 310; 30; 20 MG/100ML; MG/100ML; MG/100ML; MG/100ML
20 INJECTION, SOLUTION INTRAVENOUS CONTINUOUS
Status: DISCONTINUED | OUTPATIENT
Start: 2022-05-19 | End: 2022-05-19 | Stop reason: HOSPADM

## 2022-05-19 RX ORDER — SODIUM CHLORIDE 0.9 % (FLUSH) 0.9 %
10 SYRINGE (ML) INJECTION AS NEEDED
Status: DISCONTINUED | OUTPATIENT
Start: 2022-05-19 | End: 2022-05-19 | Stop reason: HOSPADM

## 2022-05-19 RX ORDER — SODIUM CHLORIDE 0.9 % (FLUSH) 0.9 %
10 SYRINGE (ML) INJECTION EVERY 12 HOURS SCHEDULED
Status: DISCONTINUED | OUTPATIENT
Start: 2022-05-19 | End: 2022-05-19 | Stop reason: HOSPADM

## 2022-05-19 RX ADMIN — SODIUM CHLORIDE, POTASSIUM CHLORIDE, SODIUM LACTATE AND CALCIUM CHLORIDE 20 ML/HR: 600; 310; 30; 20 INJECTION, SOLUTION INTRAVENOUS at 07:51

## 2022-05-19 NOTE — OP NOTE
Cervical Epidural Steroid Injection @ C5-C6  [facility]    PREOPERATIVE DIAGNOSIS:  Cervical Spinal Stenosis and Bilateral Cervical Radiculopathy  POSTOPERATIVE DIAGNOSIS:  Same as preop diagnosis    PROCEDURE:   Cervical Epidural Steroid Injection, Therapeutic Translaminar Injection, with epidurogram, at  C5-C6 level    PRE-PROCEDURE DISCUSSION WITH PATIENT:    Risks and complications were discussed with the patient prior to starting the procedure and informed consent was obtained.  We discussed various topics including but not limited to bleeding, infection, injury, paralysis, nerve injury, dural puncture, coma, death, worsening of clinical picture, lack of pain relief, and postprocedural soreness.    SURGEON:  Cruz Aguayo MD    REASON FOR PROCEDURE:   Degenerative changes are noted in the area., Stenotic area is noted, and is likely contributing to this chronic &/or recurrent pain.  and Radiating pattern of pain is likely consistent with degenerative changes in the area.    SEDATION:  Patient declined administration of moderate sedation   and , but an IV access was obtained for safety.  ANESTHETIC:  Marcaine 0.25%  STEROID:   Methylprednisolone (DEPO MEDROL) 80mg/ml    DESCRIPTON OF PROCEDURE:    After obtaining informed consent, I.V. was started in the preop area.   The patient was taken to the operating room and placed in the prone position.  EKG, blood pressure, and pulse oximeter were monitored throughout, and sedation was provided as needed by the RN under my guidance. All pressure points were well padded.  The cervicothoracic spine area was prepped with Chloraprep and draped in a sterile fashion.  Under fluoroscopic guidance, the aforementioned interlaminar space was identified. Skin and subcutaneous tissues were anesthetized with 1% lidocaine in the middle of the space. A Tuohy needle was introduced through the skin and advanced to this interlaminar space and into the epidural  space under fluoroscopic guidance and verified with loss-of-resistance technique to air.  After confirming the position of the needle with the fluoroscope with all the views, and after aspiration was confirmed negative for blood and CSF, 1.5 mL of Omnipaque was injected.  After seeing appropriate epidurogram with lateral and PA views, a total of 3 cc solution was injected, consisting of 1cc of local anesthetic as above, with normal saline and injectable steroid as above.     ESTIMATED BLOOD LOSS:  <5 mL  SPECIMENS:  None    COMPLICATIONS:     No complications were noted., There was no indication of vascular uptake on live injection of contrast dye., There was no indication of intrathecal uptake on live injection of contrast dye., There was not any evidence of dural puncture.   and The patient did not have any signs of postprocedure numbness nor weakness.    TOLERANCE & DISCHARGE CONDITION:    The patient tolerated the procedure well.  The patient was transported to the recovery area without difficulties.  The patient was discharged to home under the care of family in stable and satisfactory condition.    PLAN OF CARE:  1. The patient was given our standard instruction sheet.  2. The patient will Return to clinic 3-4 wks.  3. The patient will resume all medications as per the medication reconciliation sheet.

## 2022-05-19 NOTE — DISCHARGE INSTRUCTIONS
[facility] - Post-procedure Instructions          --  While there are no absolute restrictions, it is recommended that you do not perform strenuous activity today. In the morning, you may resume your level of activity as before your block.    --  If you have a band-aid at your injection site, please remove it later today. Observe the area for any redness, swelling, pus-like drainage, or a temperature over 101°. If any of these symptoms occur, please call your doctor at [phone]. If after office hours, leave a message and the on-call provider will return your call.    --  Ice may be applied to your injection site. It is recommended you avoid direct heat (heating pad; hot tub) for 1-2 days.    --  Call [facility] at [phone] if you experience persistent headache, persistent bleeding from the injection site, or severe pain not relieved by heat or oral medication.    --  Do not make important decisions today.    --  Due to the effects of the block and/or the I.V. Sedation, DO NOT drive or operate hazardous machinery for 12 hours.  Local anesthetics may cause numbness after procedure and precautions must be taken with regards to operating equipment as well as with walking, even if ambulating with assistance of another person or with an assistive device.    --  Do not drink alcohol for 12 hours.    -- You may return to work tomorrow, or as directed by your referring doctor.    --  Occasionally you may notice a slight increase in your pain after the procedure. This should start to improve within the next 24-48 hours. Radiofrequency ablation procedure pain may last 3-4 weeks.    --  It may take as long as 3-4 days before you notice a gradual improvement in your pain and/or other symptoms.    -- You may continue to take your prescribed pain medication as needed.    --  Some normal possible side effects of steroid use could include fluid retention, increased blood sugar, dull headache,  increased sweating, increased appetite, mood swings and flushing.    --  Diabetics are recommended to watch their blood glucose level closely for 24-48 hours after the injection.    --  Must stay in PACU for 20 min upon arrival and prove no leg weakness before being discharged.    --  IN THE EVENT OF A LIFE THREATENING EMERGENCY, (CHEST PAIN, BREATHING DIFFICULTIES, PARALYSIS…) YOU SHOULD GO TO YOUR NEAREST EMERGENCY ROOM.    --  You should be contacted by our office within 2-3 days to schedule follow up or next appointment date.  If not contacted within 7 days, please call the office at [phone]

## 2022-06-10 ENCOUNTER — OFFICE VISIT (OUTPATIENT)
Dept: PAIN MEDICINE | Facility: CLINIC | Age: 57
End: 2022-06-10

## 2022-06-10 VITALS
HEART RATE: 88 BPM | TEMPERATURE: 97.3 F | DIASTOLIC BLOOD PRESSURE: 69 MMHG | OXYGEN SATURATION: 97 % | BODY MASS INDEX: 31.18 KG/M2 | SYSTOLIC BLOOD PRESSURE: 131 MMHG | HEIGHT: 70 IN | RESPIRATION RATE: 12 BRPM | WEIGHT: 217.8 LBS

## 2022-06-10 DIAGNOSIS — M51.36 DDD (DEGENERATIVE DISC DISEASE), LUMBAR: ICD-10-CM

## 2022-06-10 DIAGNOSIS — Z79.899 HIGH RISK MEDICATION USE: ICD-10-CM

## 2022-06-10 DIAGNOSIS — M54.12 CERVICAL RADICULOPATHY: ICD-10-CM

## 2022-06-10 DIAGNOSIS — M54.16 LUMBAR RADICULITIS: ICD-10-CM

## 2022-06-10 DIAGNOSIS — M50.20 DISPLACEMENT OF CERVICAL INTERVERTEBRAL DISC WITHOUT MYELOPATHY: Primary | ICD-10-CM

## 2022-06-10 LAB
POC AMPHETAMINES: NEGATIVE
POC BARBITURATES: NEGATIVE
POC BENZODIAZEPHINES: NEGATIVE
POC COCAINE: NEGATIVE
POC METHADONE: NEGATIVE
POC METHAMPHETAMINE SCREEN URINE: NEGATIVE
POC OPIATES: POSITIVE
POC OXYCODONE: NEGATIVE
POC PHENCYCLIDINE: NEGATIVE
POC PROPOXYPHENE: NEGATIVE
POC THC: NEGATIVE
POC TRICYCLIC ANTIDEPRESSANTS: NEGATIVE

## 2022-06-10 PROCEDURE — 80305 DRUG TEST PRSMV DIR OPT OBS: CPT | Performed by: NURSE PRACTITIONER

## 2022-06-10 PROCEDURE — 99214 OFFICE O/P EST MOD 30 MIN: CPT | Performed by: NURSE PRACTITIONER

## 2022-06-10 RX ORDER — HYDROCODONE BITARTRATE AND ACETAMINOPHEN 10; 325 MG/1; MG/1
1 TABLET ORAL
Qty: 150 TABLET | Refills: 0 | Status: SHIPPED | OUTPATIENT
Start: 2022-06-10 | End: 2022-07-12 | Stop reason: SDUPTHER
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
## 2022-07-03 ENCOUNTER — HOSPITAL ENCOUNTER (OUTPATIENT)
Dept: MRI IMAGING | Facility: HOSPITAL | Age: 57
Discharge: HOME | End: 2022-07-03
Admitting: NURSE PRACTITIONER

## 2022-07-03 DIAGNOSIS — M51.36 DDD (DEGENERATIVE DISC DISEASE), LUMBAR: ICD-10-CM

## 2022-07-03 DIAGNOSIS — M54.16 LUMBAR RADICULITIS: ICD-10-CM

## 2022-07-03 PROCEDURE — 0 GADOBENATE DIMEGLUMINE 529 MG/ML SOLUTION: Performed by: NURSE PRACTITIONER

## 2022-07-03 PROCEDURE — 72158 MRI LUMBAR SPINE W/O & W/DYE: CPT

## 2022-07-03 PROCEDURE — A9577 INJ MULTIHANCE: HCPCS | Performed by: NURSE PRACTITIONER

## 2022-07-03 RX ADMIN — GADOBENATE DIMEGLUMINE 20 ML: 529 INJECTION, SOLUTION INTRAVENOUS at 09:11

## 2022-07-06 NOTE — PROGRESS NOTES
Slight progression of degenerative changes and disc bulge at L4/L5. Could consider LESI versus LTFESI. He should schedule f/u visit.  Could see any APRN.

## 2022-07-12 ENCOUNTER — PREP FOR SURGERY (OUTPATIENT)
Dept: SURGERY | Facility: SURGERY CENTER | Age: 57
End: 2022-07-12

## 2022-07-12 ENCOUNTER — OFFICE VISIT (OUTPATIENT)
Dept: PAIN MEDICINE | Facility: CLINIC | Age: 57
End: 2022-07-12

## 2022-07-12 VITALS
OXYGEN SATURATION: 97 % | WEIGHT: 215.2 LBS | SYSTOLIC BLOOD PRESSURE: 133 MMHG | DIASTOLIC BLOOD PRESSURE: 73 MMHG | TEMPERATURE: 97.4 F | BODY MASS INDEX: 30.81 KG/M2 | HEART RATE: 81 BPM | HEIGHT: 70 IN

## 2022-07-12 DIAGNOSIS — M51.36 DDD (DEGENERATIVE DISC DISEASE), LUMBAR: ICD-10-CM

## 2022-07-12 DIAGNOSIS — M54.12 CERVICAL RADICULOPATHY: ICD-10-CM

## 2022-07-12 DIAGNOSIS — M54.16 LUMBAR RADICULITIS: Primary | ICD-10-CM

## 2022-07-12 DIAGNOSIS — M50.20 DISPLACEMENT OF CERVICAL INTERVERTEBRAL DISC WITHOUT MYELOPATHY: Primary | ICD-10-CM

## 2022-07-12 DIAGNOSIS — M54.16 LUMBAR RADICULITIS: ICD-10-CM

## 2022-07-12 PROCEDURE — 99214 OFFICE O/P EST MOD 30 MIN: CPT

## 2022-07-12 RX ORDER — HYDROCODONE BITARTRATE AND ACETAMINOPHEN 10; 325 MG/1; MG/1
1 TABLET ORAL
Qty: 150 TABLET | Refills: 0 | Status: SHIPPED | OUTPATIENT
Start: 2022-07-12 | End: 2022-08-09 | Stop reason: SDUPTHER

## 2022-07-12 RX ORDER — SODIUM CHLORIDE 0.9 % (FLUSH) 0.9 %
10 SYRINGE (ML) INJECTION AS NEEDED
Status: CANCELLED | OUTPATIENT
Start: 2022-07-12

## 2022-07-12 RX ORDER — SODIUM CHLORIDE 0.9 % (FLUSH) 0.9 %
10 SYRINGE (ML) INJECTION EVERY 12 HOURS SCHEDULED
Status: CANCELLED | OUTPATIENT
Start: 2022-07-12

## 2022-07-12 NOTE — PROGRESS NOTES
CHIEF COMPLAINT  F/U neck pain- patient states that his pain has remained the same since his last visit.     Subjective   Colby Alvarado is a 56 y.o. male  who presents for follow-up.  He has a history of neck pain and back pain. His neck pain is unchanged since his last appointment but his back pain has been gradually worsening.      Today pain is 7/10VAS in severity. Pain is located in his neck and lower back. Describes this pain as an intermittent aching and burning. Pain is worsened by certain movements, prolonged positions, bending and twisting. Pain improves with rest, reposition, and medication.     Continues with Hydrocodone 10mg 5/day. He also takes Flexeril and Naproxen PRN. Denies any side effects from the regimen, including constipation and somnolence. The regimen helps decrease pain by a moderate amount. He works as a  and this helps gets through the day. ADL's by self. Denies any bowel or bladder changes.     Procedures:  5/19/22 - SHAY - 50% ongoing relief  4/5/22 - SHAY - 80% relief x a couple of weeks    He has a history of lumbar spine surgery 15 years ago with Dr. Moon.     Patient remained masked during entire encounter. No cough present. I donned a mask and eye protection throughout entire visit. Prior to donning mask and eye protection, hand hygiene was performed, as well as when it was doffed.  I was closer than 6 feet, but not for an extended period of time. No obvious exposure to any bodily fluids.    Neck Pain   This is a chronic problem. The current episode started more than 1 year ago. The problem occurs daily. The problem has been unchanged (unchanged since last visit). The pain is present in the left side, midline and right side. The quality of the pain is described as aching and burning. The pain is at a severity of 7/10. The symptoms are aggravated by position and twisting. Associated symptoms include headaches. Pertinent negatives include no chest pain, fever,  numbness or weakness. He has tried NSAIDs, acetaminophen, neck support, muscle relaxants and oral narcotics for the symptoms. The treatment provided mild relief.   Back Pain  The current episode started more than 1 month ago. The problem occurs daily. The problem has been gradually worsening since onset. The pain is present in the lumbar spine. The quality of the pain is described as aching and burning. The pain radiates to the left thigh and right thigh (bilateral hips, lateral legs). The pain is at a severity of 7/10. The symptoms are aggravated by lying down, standing and sitting (work, prolonged position, lifting). Associated symptoms include headaches. Pertinent negatives include no abdominal pain, chest pain, dysuria, fever, numbness or weakness. He has tried NSAIDs and analgesics for the symptoms. The treatment provided mild relief.      PEG Assessment   What number best describes your pain on average in the past week?8  What number best describes how, during the past week, pain has interfered with your enjoyment of life?8  What number best describes how, during the past week, pain has interfered with your general activity?  8    Review of Pertinent Medical Data ---  Reviewed office note from LUIS EDUARDO Mendoza 6/10/2022.  Patient presented for a follow-up from procedure.  He completed a SHAY 5/19/2022 for by Dr. Aguayo for management of back pain. At this time he reported 50% ongoing relief from procedure.  Patient reported her recent flare and low back pain.  He saw LUIS EDUARDO Beasley with [facility].  He was treated with steroid, muscle relaxer, and NSAID.  He reported that it initially helped but pain with beginning to return.  Dr. Anderson said the next step would be to update his MRI.  Patient would like to proceed with this.  Medication regimen includes Hydrocodone, Flexeril and Naproxen.  Plan at this appointment was to update MRI of lumbar spine, continue medication regimen, and  "follow-up in 1 month or sooner if needed.      The following portions of the patient's history were reviewed and updated as appropriate: allergies, current medications, past family history, past medical history, past social history, past surgical history and problem list.    Review of Systems   Constitutional: Positive for fatigue. Negative for activity change, chills and fever.   HENT: Negative for congestion.    Eyes: Negative for visual disturbance.   Respiratory: Negative for chest tightness and shortness of breath.    Cardiovascular: Negative for chest pain.   Gastrointestinal: Negative for abdominal pain, constipation and diarrhea.   Genitourinary: Negative for difficulty urinating and dysuria.   Musculoskeletal: Positive for neck pain. Negative for back pain.   Neurological: Positive for headaches. Negative for dizziness, weakness, light-headedness and numbness.   Psychiatric/Behavioral: Positive for sleep disturbance. Negative for agitation. The patient is not nervous/anxious.      I have reviewed and confirmed the accuracy of the ROS as documented by the MA/LPN/RN LUIS EDUARDO Leslie    Vitals:    07/12/22 0811   BP: 133/73   Pulse: 81   Temp: 97.4 °F (36.3 °C)   SpO2: 97%   Weight: 97.6 kg (215 lb 3.2 oz)   Height: 177.8 cm (70\")   PainSc:   7   PainLoc: Neck     Objective   Physical Exam  Constitutional:       Appearance: Normal appearance.   HENT:      Head: Normocephalic.   Cardiovascular:      Rate and Rhythm: Normal rate and regular rhythm.   Pulmonary:      Effort: Pulmonary effort is normal.      Breath sounds: Normal breath sounds.   Musculoskeletal:      Cervical back: Normal range of motion. Tenderness and bony tenderness present. Pain with movement present.      Lumbar back: Tenderness and bony tenderness present. Decreased range of motion.   Skin:     General: Skin is warm and dry.      Capillary Refill: Capillary refill takes less than 2 seconds.   Neurological:      General: No focal " deficit present.      Mental Status: He is alert and oriented to person, place, and time.   Psychiatric:         Mood and Affect: Mood normal.         Speech: Speech normal.         Behavior: Behavior normal.         Thought Content: Thought content normal.         Cognition and Memory: Cognition normal.       Assessment & Plan   Diagnoses and all orders for this visit:    1. Displacement of cervical intervertebral disc without myelopathy (Primary)    2. Cervical radiculopathy    3. DDD (degenerative disc disease), lumbar  -     HYDROcodone-acetaminophen (NORCO)  MG per tablet; Take 1 tablet by mouth 5 (Five) Times a Day. DNF 7/13/22  Dispense: 150 tablet; Refill: 0    4. Lumbar radiculitis  -     HYDROcodone-acetaminophen (NORCO)  MG per tablet; Take 1 tablet by mouth 5 (Five) Times a Day. DNF 7/13/22  Dispense: 150 tablet; Refill: 0    --- Bilateral L4 TFESI  ---  Indications for epidural injection:  Plan is to proceed with epidural at the appropriate level.  If the patient receives significant pain reduction and improvement in function and the plan will be to repeat the epidural when the pain worsens.  If a second epidural provides at least 6 weeks of sustained improvement that includes both pain reduction and improvement in function then an epidural injection could be repeated once again at the same level.  This is a mutual decision between the clinician and the patient that includes discussions including risks and benefits in detail as well as alternative therapies.  Patient's questions were answered to their satisfaction and to their understanding.  ---  --- Reviewed MRI results with patient.  --- The urine drug screen confirmation from 6/10/22 has been reviewed and the result is appropriate based on patient history and CARI report.  --- CSA updated on 6/10/22.  --- Refill Hydrocodone. DNF 7/13/22. Patient appears stable with current regimen. No adverse effects. Regarding continuation of opioids,  there is no evidence of aberrant behavior or any red flags.  The patient continues with appropriate response to opioid therapy. ADL's remain intact by self.   --- Follow-up for procedure     CARI REPORT  As part of the patient's treatment plan, I am prescribing controlled substances. The patient has been made aware of appropriate use of such medications, including potential risk of somnolence, limited ability to drive and/or work safely, and the potential for dependence or overdose. It has also been made clear that these medications are for use by this patient only, without concomitant use of alcohol or other substances unless prescribed.     Patient has completed prescribing agreement detailing terms of continued prescribing of controlled substances, including monitoring CARI reports, urine drug screening, and pill counts if necessary. The patient is aware that inappropriate use will results in cessation of prescribing such medications.    As the clinician, I personally reviewed the CARI from 7/12/22 while the patient was in the office today.    History and physical exam exhibit continued safe and appropriate use of controlled substances.    Dictated utilizing Dragon dictation.     This document is intended for medical expert use only. Reading of this document by patients and/or patient's family without participating medical staff guidance may result in misinterpretation and unintended morbidity.   Any interpretation of such data is the responsibility of the patient and/or family member responsible for the patient in concert with their primary or specialist providers, not to be left for sources of online searches such as [employer], surespot or similar queries. Relying on these approaches to knowledge may result in misinterpretation, misguided goals of care and even death should patients or family members try recommendations outside of the realm of professional medical care in a supervised way.

## 2022-07-12 NOTE — PATIENT INSTRUCTIONS
---  Indications for epidural injection:  Plan is to proceed with epidural at the appropriate level.  If the patient receives significant pain reduction and improvement in function and the plan will be to repeat the epidural when the pain worsens.  If a second epidural provides at least 6 weeks of sustained improvement that includes both pain reduction and improvement in function then an epidural injection could be repeated once again at the same level.  This is a mutual decision between the clinician and the patient that includes discussions including risks and benefits in detail as well as alternative therapies.  Patient's questions were answered to their satisfaction and to their understanding.  ---  Discussed with the patient that sedation is optional for this procedure.  The sedation offered is called conscious sedation which is different from general anesthesia that is utilized in surgical procedures. The dosing of the sedation is determined by the physician and they will be monitored throughout the procedure. With conscious sedation it is possible to remember parts or all of the procedure, this is normal. They will need to have a  with them as driving is prohibited following conscious sedation.      NPO instructions for conscious sedation:  --- Do not eat 6 hours prior to the procedure.   --- Do not drink any dairy or citrus 4 hours prior to the procedure.   --- Do not drink anything, including clear liquids, 2 hours prior to procedure.      If the NPO instructions are not followed then the procedure may be performed without sedation or the procedure will need to be rescheduled.

## 2022-07-18 RX ORDER — METOPROLOL SUCCINATE 100 MG/1
TABLET, EXTENDED RELEASE ORAL
Qty: 135 TABLET | Refills: 3 | Status: SHIPPED | OUTPATIENT
Start: 2022-07-18

## 2022-07-21 ENCOUNTER — TELEPHONE (OUTPATIENT)
Dept: PAIN MEDICINE | Facility: CLINIC | Age: 57
End: 2022-07-21

## 2022-08-01 ENCOUNTER — TRANSCRIBE ORDERS (OUTPATIENT)
Dept: SURGERY | Facility: SURGERY CENTER | Age: 57
End: 2022-08-01

## 2022-08-01 DIAGNOSIS — M54.16 LUMBAR RADICULITIS: Primary | ICD-10-CM

## 2022-08-01 PROBLEM — M51.36 DDD (DEGENERATIVE DISC DISEASE), LUMBAR: Status: ACTIVE | Noted: 2022-08-01

## 2022-08-01 PROBLEM — M51.369 DDD (DEGENERATIVE DISC DISEASE), LUMBAR: Status: ACTIVE | Noted: 2022-08-01

## 2022-08-09 DIAGNOSIS — M51.36 DDD (DEGENERATIVE DISC DISEASE), LUMBAR: ICD-10-CM

## 2022-08-09 DIAGNOSIS — M54.16 LUMBAR RADICULITIS: ICD-10-CM

## 2022-08-09 RX ORDER — HYDROCODONE BITARTRATE AND ACETAMINOPHEN 10; 325 MG/1; MG/1
1 TABLET ORAL
Qty: 150 TABLET | Refills: 0 | Status: SHIPPED | OUTPATIENT
Start: 2022-08-09 | End: 2022-09-09 | Stop reason: SDUPTHER

## 2022-08-09 NOTE — TELEPHONE ENCOUNTER
Medication Refill Request    Date of phone call: 8/9/2022    Medication being requested: hydro/apap  mg sig: take 1 tab 5 times daily prn  Qty: 150    Date of last visit: 7/12/2022    Date of last refill: 7/13/2022    CARI up to date?: yes    Next Follow up?: 8/23/2022 - procedure with Dr. Aguayo    Any new pertinent information? (i.e, new medication allergies, new use of medications, change in patient's health or condition, non-compliance or inconsistency with prescribing agreement?):

## 2022-08-22 PROCEDURE — S0260 H&P FOR SURGERY: HCPCS | Performed by: ANESTHESIOLOGY

## 2022-08-22 NOTE — H&P
Brief Pre-procedural / Pre-operative H&P        -----    Pertinent Diagnosis:   Lumbar degenerative disc disease.  Lumbar radiculopathy bilaterally    Proposed Procedure: Lumbar transforaminal epidural straight injection anticipated bilaterally anticipated at the L4 level      Subjective   Colby Alvarado is a 56 y.o. male  who presents for intervention.  He has a history of back and leg pains.      History of Present Illness     He has a history of back pain in addition to neck pain.  He has displayed low back tenderness and decreased range of motion.  He complains of aching and burning pains that radiate especially into the thighs and down into the legs laterally in the knee due to seem to be approximating the L4 dermatome distribution where he also has degenerative changes and he previously noted marrow edema and also the listhesis and bulging disc material.    -------    The following portions of the patient's history were reviewed and updated as appropriate: allergies, current medications, past family history, past medical history, past social history, past surgical history and problem list.    No Known Allergies      Current Facility-Administered Medications:   •  sodium chloride 0.9 % flush 10 mL, 10 mL, Intravenous, Q12H, Cruz Aguayo MD  •  sodium chloride 0.9 % flush 10 mL, 10 mL, Intravenous, PRN, Cruz Aguayo MD    No current facility-administered medications on file prior to encounter.     Current Outpatient Medications on File Prior to Encounter   Medication Sig Dispense Refill   • atorvastatin (LIPITOR) 80 MG tablet Take 80 mg by mouth Every Night.     • cetirizine (zyrTEC) 10 MG tablet Take 10 mg by mouth Daily.     • cyclobenzaprine (FLEXERIL) 10 MG tablet Take 10 mg by mouth.     • ezetimibe (ZETIA) 10 MG tablet Take 10 mg by mouth every night at bedtime.     • Galcanezumab-gnlm (Emgality, 300 MG Dose,) 100 MG/ML solution prefilled syringe As Needed.     • lisinopril (PRINIVIL,ZESTRIL)  10 MG tablet Take 1 tablet by mouth 2 (two) times a day. 60 tablet 6   • metoprolol succinate XL (TOPROL-XL) 100 MG 24 hr tablet TAKE 1 TABLET BY MOUTH  DAILY MAY TAKE AN  ADDITIONAL 1/2 TABLET DAILY AS NEEDED 135 tablet 3   • SUMAtriptan (IMITREX) 6 MG/0.5ML injection Inject prescribed dose at onset of headache. May repeat dose one time in 1 hour(s) if headache not relieved.     • zolpidem (AMBIEN) 10 MG tablet Take 10 mg by mouth At Night As Needed for Sleep.     • naproxen (EC NAPROSYN) 500 MG EC tablet Take 500 mg by mouth.     • naproxen (NAPROSYN) 500 MG tablet Take 500 mg by mouth Daily.     • predniSONE (DELTASONE) 20 MG tablet Take 20 mg by mouth Daily.         Patient Active Problem List   Diagnosis   • Complete tear of right rotator cuff   • PAF (paroxysmal atrial fibrillation) ([facility])   • Essential hypertension   • PRYOR (dyspnea on exertion)   • Palpitations   • Precordial pain   • DDD (degenerative disc disease), cervical   • Cervical radiculopathy   • Lumbar radiculitis   • DDD (degenerative disc disease), lumbar       Past Medical History:   Diagnosis Date   • Anxiety    • Arthritis of back    • Atrial fibrillation (HCC)    • Chronic pain    • Depression    • H/O cluster headache    • Hearing loss    • Hyperlipidemia    • Hypertension    • Insomnia    • Periarthritis of shoulder    • Seasonal allergies    • Sleep apnea     using CPAP       Past Surgical History:   Procedure Laterality Date   • BACK SURGERY     • CARDIAC ABLATION  07/31/2019   • CERVICAL EPIDURAL Bilateral 4/5/2022    Procedure: CERVICAL EPIDURAL;  Surgeon: Cruz Aguayo MD;  Location: [facility] MAIN OR;  Service: Pain Management;  Laterality: Bilateral;   • CERVICAL EPIDURAL N/A 5/19/2022    Procedure: CERVICAL EPIDURAL;  Surgeon: Cruz Aguayo MD;  Location: [facility] MAIN OR;  Service: Pain Management;  Laterality: N/A;   • EYE MUSCLE SURGERY Left    • HAND SURGERY Right     open fracture plate   • HARDWARE REMOVAL Right     rt hand   plate   • HERNIA REPAIR     • NASAL FRACTURE SURGERY     • SHOULDER ARTHROSCOPY W/ ROTATOR CUFF REPAIR Right 3/13/2019    Procedure: SHOULDER ARTHROSCOPY, DEBRIDEIMENT OF LABRUM AND SUBSCAP, DECEOMPRESSION, DISTAL CLAVICLE EXCISION,  WITH MINI OPEN ROTATOR CUFF REPAIR;  Surgeon: Estela Townsend MD;  Location: [facility] OR [facility];  Service: Orthopedics   • SHOULDER SURGERY     • SPINE SURGERY     • VASECTOMY         Family History   Problem Relation Age of Onset   • Hypertension Other    • Heart disease Other    • Lung disease Other    • Emphysema Mother    • Hypertension Mother    • Heart disease Mother    • Cancer Mother         Oral   • Hypertension Sister    • No Known Problems Brother    • Heart attack Maternal Grandmother    • Emphysema Maternal Grandmother    • Heart disease Maternal Grandmother    • Stroke Maternal Grandmother    • Heart attack Maternal Grandfather    • Emphysema Maternal Grandfather    • Malig Hyperthermia Neg Hx        Social History     Socioeconomic History   • Marital status: Significant Other   Tobacco Use   • Smoking status: Former Smoker     Packs/day: 1.50     Years: 30.00     Pack years: 45.00     Types: Cigarettes     Quit date: 2021     Years since quittin.4   • Smokeless tobacco: Former User     Types: Snuff   Vaping Use   • Vaping Use: Some days   • Substances: Nicotine   • Devices: Pre-filled or refillable cartridge   Substance and Sexual Activity   • Alcohol use: Yes     Alcohol/week: 16.0 standard drinks     Types: 16 Cans of beer per week     Comment: occ   • Drug use: No   • Sexual activity: Defer       -------       Review of Systems  No Fever, No Chills, No ear pain, No sinus pressure or drainage, No eye pain or drainage, No cough, No SOB, No chest tightness nor chest pain, no palpitations.          Vitals:    22 0800   BP: 136/74   BP Location: Left arm   Patient Position: Lying   Pulse: 72   Resp: 18   Temp: 97.5 °F (36.4 °C)   TempSrc: Temporal   SpO2: 97%  "  Weight: 94.3 kg (208 lb)   Height: 177.8 cm (70\")         Objective   Physical Exam  VSS, NNR, NCAT, NMNA, NRD, AAOx3.  slr pos leoncio    -------    Assessment & Plan:  - as noted above, the stated intervention is indicated  - Follow-up plan will be noted in the operative report        Plan to follow-up in a few weeks      EMR Dragon/Transcription disclaimer:   Typed items in this encounter note may have been created by electronic transcription/translation software which converts spoken language to printed text. The electronic translation of spoken language may permit erroneous, or at times, nonsensical words or phrases to be inadvertently transcribed; Although I have reviewed the note for such errors, some may still exist.      "

## 2022-08-23 ENCOUNTER — HOSPITAL ENCOUNTER (OUTPATIENT)
Facility: SURGERY CENTER | Age: 57
Setting detail: HOSPITAL OUTPATIENT SURGERY
Discharge: HOME | End: 2022-08-23
Attending: ANESTHESIOLOGY | Admitting: ANESTHESIOLOGY

## 2022-08-23 ENCOUNTER — HOSPITAL ENCOUNTER (OUTPATIENT)
Dept: GENERAL RADIOLOGY | Facility: SURGERY CENTER | Age: 57
Setting detail: HOSPITAL OUTPATIENT SURGERY
End: 2022-08-23

## 2022-08-23 VITALS
HEIGHT: 70 IN | OXYGEN SATURATION: 97 % | HEART RATE: 68 BPM | DIASTOLIC BLOOD PRESSURE: 77 MMHG | TEMPERATURE: 98 F | RESPIRATION RATE: 20 BRPM | WEIGHT: 208 LBS | SYSTOLIC BLOOD PRESSURE: 141 MMHG | BODY MASS INDEX: 29.78 KG/M2

## 2022-08-23 DIAGNOSIS — M54.16 LUMBAR RADICULITIS: ICD-10-CM

## 2022-08-23 DIAGNOSIS — M51.36 DDD (DEGENERATIVE DISC DISEASE), LUMBAR: ICD-10-CM

## 2022-08-23 PROCEDURE — 0 IOHEXOL 300 MG/ML SOLUTION 10 ML VIAL: Performed by: ANESTHESIOLOGY

## 2022-08-23 PROCEDURE — 64483 NJX AA&/STRD TFRM EPI L/S 1: CPT | Performed by: ANESTHESIOLOGY

## 2022-08-23 PROCEDURE — 77002 NEEDLE LOCALIZATION BY XRAY: CPT

## 2022-08-23 PROCEDURE — 76000 FLUOROSCOPY <1 HR PHYS/QHP: CPT

## 2022-08-23 PROCEDURE — 25010000002 METHYLPREDNISOLONE PER 80 MG: Performed by: ANESTHESIOLOGY

## 2022-08-23 RX ORDER — SODIUM CHLORIDE 0.9 % (FLUSH) 0.9 %
10 SYRINGE (ML) INJECTION AS NEEDED
Status: DISCONTINUED | OUTPATIENT
Start: 2022-08-23 | End: 2022-08-23 | Stop reason: HOSPADM

## 2022-08-23 RX ORDER — SODIUM CHLORIDE 0.9 % (FLUSH) 0.9 %
10 SYRINGE (ML) INJECTION EVERY 12 HOURS SCHEDULED
Status: DISCONTINUED | OUTPATIENT
Start: 2022-08-23 | End: 2022-08-23 | Stop reason: HOSPADM

## 2022-08-23 NOTE — DISCHARGE INSTRUCTIONS
[facility] - Post-procedure Instructions          --  While there are no absolute restrictions, it is recommended that you do not perform strenuous activity today. In the morning, you may resume your level of activity as before your block.    --  If you have a band-aid at your injection site, please remove it later today. Observe the area for any redness, swelling, pus-like drainage, or a temperature over 101°. If any of these symptoms occur, please call your doctor at [phone]. If after office hours, leave a message and the on-call provider will return your call.    --  Ice may be applied to your injection site. It is recommended you avoid direct heat (heating pad; hot tub) for 1-2 days.    --  Call [facility] at [phone] if you experience persistent headache, persistent bleeding from the injection site, or severe pain not relieved by heat or oral medication.    --  Do not make important decisions today.    --  Due to the effects of the block and/or the I.V. Sedation, DO NOT drive or operate hazardous machinery for 12 hours.  Local anesthetics may cause numbness after procedure and precautions must be taken with regards to operating equipment as well as with walking, even if ambulating with assistance of another person or with an assistive device.    --  Do not drink alcohol for 12 hours.    -- You may return to work tomorrow, or as directed by your referring doctor.    --  Occasionally you may notice a slight increase in your pain after the procedure. This should start to improve within the next 24-48 hours. Radiofrequency ablation procedure pain may last 3-4 weeks.    --  It may take as long as 3-4 days before you notice a gradual improvement in your pain and/or other symptoms.    -- You may continue to take your prescribed pain medication as needed.    --  Some normal possible side effects of steroid use could include fluid retention, increased blood sugar, dull headache,  increased sweating, increased appetite, mood swings and flushing.    --  Diabetics are recommended to watch their blood glucose level closely for 24-48 hours after the injection.    --  Must stay in PACU for 20 min upon arrival and prove no leg weakness before being discharged.    --  IN THE EVENT OF A LIFE THREATENING EMERGENCY, (CHEST PAIN, BREATHING DIFFICULTIES, PARALYSIS…) YOU SHOULD GO TO YOUR NEAREST EMERGENCY ROOM.    --  You should be contacted by our office within 2-3 days to schedule follow up or next appointment date.  If not contacted within 7 days, please call the office at [phone]

## 2022-08-23 NOTE — OP NOTE
Bilateral L4 Lumbar Transforaminal Epidural Steroid Injection  [facility]    PREOPERATIVE DIAGNOSIS:  Lumbar Degenerative Disc Disease, Lumbar Spinal Stenosis unspecified regarding Neurogenic Claudication and bilateral Lumbar Radiculopathy    POSTOPERATIVE DIAGNOSIS:  Same as preop diagnosis    PROCEDURE:  CPT 39771(-50) --  Diagnostic Transforaminal Epidural Steroid Injection at the L4 level, bilaterally    PRE-PROCEDURE DISCUSSION WITH PATIENT:    Risks and complications were discussed with the patient prior to starting the procedure and informed consent was obtained.  We discussed various topics including but not limited to bleeding, infection, injury, nerve injury, paralysis, coma, death, postprocedural painful flare-up, postprocedural site soreness, and a lack of pain relief.  We discussed the diagnostic aspect of transforaminal epidural / selective nerve root blockade.    SURGEON:  Cruz Aguayo MD    REASON FOR PROCEDURE:    Degenerative changes are noted in the area., Stenotic area is noted, and is likely contributing to this chronic &/or recurrent pain.  and Radiating pattern of pain is likely consistent with degenerative changes in the area.    SEDATION:  Patient declined administration of moderate sedation    ANESTHETIC:  Marcaine 0.25%  STEROID:  Methylprednisolone (DEPO MEDROL) 80mg/ml    DESCRIPTON OF PROCEDURE:  After obtaining informed consent, an I.V. was not started in the preoperative area. The patient taken to the operating room and was placed in the prone position with a pillow under the abdomen.  All pressure points were well padded.  EKG, blood pressure, and pulse oximeter were monitored.  The lumbosacral area was prepped with Chloraprep and draped in a sterile fashion. Under fluoroscopic guidance in an oblique dimension, the transverse process of the aforementioned vertebra at the junction of the body at 6 o'clock position on one side was identified. Skin and  subcutaneous tissue was anesthetized with 1% lidocaine. A 22-gauge spinal needle was introduced under fluoroscopic guidance at the above junction into the foramen without parasthesias and into the epidural space. After confirming the position of the needle with PA, lateral, and oblique fluoroscopic views, aspiration was checked and was clear of blood or CSF.  Next, 1 mL of Omnipaque was injected. After seeing adequate spread on the corresponding nerve root, a total volume 2mL of injectate containing 0.5ml of the above mentioned local anesthetic, 1 ml saline,  and half of the above mentioned corticosteroid was injected into the epidural space.    The needle was removed intact.      Next, the same vertebral level and corresponding foramen on the contralateral side was visualized with fluoroscopy in an oblique view, and a similar approach was used to place the spinal needle in that foramen.  After confirming the position of the needle with PA, lateral, and oblique fluoroscopic views, aspiration was checked and was clear of blood or CSF.  Next, 1 mL of Omnipaque was injected. After seeing adequate spread on the corresponding nerve root, a total volume 2mL of injectate containing 0.5ml of the above mentioned local anesthetic, 1 ml saline, and half of the above mentioned corticosteroid was injected into the epidural space. The needle was removed intact.  Vital signs were stable throughout.      ESTIMATED BLOOD LOSS:  <5 mL  SPECIMENS:  none    COMPLICATIONS:   No complications were noted., There was no indication of vascular uptake on live injection of contrast dye., There was no indication of intrathecal uptake on live injection of contrast dye., There was not any evidence of dural puncture.   and The patient did not have any signs of postprocedure numbness nor weakness.    TOLERANCE & DISCHARGE CONDITION:    The patient tolerated the procedure well.  The patient was transported to the recovery area without difficulties.   The patient was discharged to home under the care of family in stable and satisfactory condition.    PLAN OF CARE:  1. The patient was given our standard instruction sheet.  2. The patient will Return to clinic 3-4 wks.  3. The patient will resume all medications as per the medication reconciliation sheet.

## 2022-09-08 ENCOUNTER — TELEPHONE (OUTPATIENT)
Dept: PAIN MEDICINE | Facility: CLINIC | Age: 57
End: 2022-09-08

## 2022-09-08 DIAGNOSIS — M54.16 LUMBAR RADICULITIS: ICD-10-CM

## 2022-09-08 DIAGNOSIS — M51.36 DDD (DEGENERATIVE DISC DISEASE), LUMBAR: ICD-10-CM

## 2022-09-09 RX ORDER — HYDROCODONE BITARTRATE AND ACETAMINOPHEN 10; 325 MG/1; MG/1
1 TABLET ORAL
Qty: 65 TABLET | Refills: 0 | Status: SHIPPED | OUTPATIENT
Start: 2022-09-09 | End: 2022-09-23 | Stop reason: SDUPTHER

## 2022-09-23 ENCOUNTER — TRANSCRIBE ORDERS (OUTPATIENT)
Dept: SURGERY | Facility: SURGERY CENTER | Age: 57
End: 2022-09-23

## 2022-09-23 ENCOUNTER — OFFICE VISIT (OUTPATIENT)
Dept: PAIN MEDICINE | Facility: CLINIC | Age: 57
End: 2022-09-23

## 2022-09-23 ENCOUNTER — PREP FOR SURGERY (OUTPATIENT)
Dept: SURGERY | Facility: SURGERY CENTER | Age: 57
End: 2022-09-23

## 2022-09-23 VITALS
HEART RATE: 84 BPM | SYSTOLIC BLOOD PRESSURE: 148 MMHG | HEIGHT: 70 IN | BODY MASS INDEX: 29.29 KG/M2 | DIASTOLIC BLOOD PRESSURE: 79 MMHG | OXYGEN SATURATION: 97 % | TEMPERATURE: 98.2 F | WEIGHT: 204.6 LBS

## 2022-09-23 DIAGNOSIS — M50.20 DISPLACEMENT OF CERVICAL INTERVERTEBRAL DISC WITHOUT MYELOPATHY: ICD-10-CM

## 2022-09-23 DIAGNOSIS — Z41.9 SURGERY, ELECTIVE: Primary | ICD-10-CM

## 2022-09-23 DIAGNOSIS — Z79.899 HIGH RISK MEDICATION USE: ICD-10-CM

## 2022-09-23 DIAGNOSIS — M54.12 CERVICAL RADICULOPATHY: ICD-10-CM

## 2022-09-23 DIAGNOSIS — M54.16 LUMBAR RADICULITIS: ICD-10-CM

## 2022-09-23 DIAGNOSIS — M47.816 LUMBAR FACET ARTHROPATHY: Primary | ICD-10-CM

## 2022-09-23 DIAGNOSIS — M51.36 DDD (DEGENERATIVE DISC DISEASE), LUMBAR: Primary | ICD-10-CM

## 2022-09-23 PROCEDURE — 99214 OFFICE O/P EST MOD 30 MIN: CPT | Performed by: NURSE PRACTITIONER

## 2022-09-23 RX ORDER — SODIUM CHLORIDE 0.9 % (FLUSH) 0.9 %
10 SYRINGE (ML) INJECTION EVERY 12 HOURS SCHEDULED
Status: CANCELLED | OUTPATIENT
Start: 2022-09-23

## 2022-09-23 RX ORDER — SODIUM CHLORIDE 0.9 % (FLUSH) 0.9 %
10 SYRINGE (ML) INJECTION AS NEEDED
Status: CANCELLED | OUTPATIENT
Start: 2022-09-23

## 2022-09-23 RX ORDER — HYDROCODONE BITARTRATE AND ACETAMINOPHEN 10; 325 MG/1; MG/1
1 TABLET ORAL
Qty: 150 TABLET | Refills: 0 | Status: SHIPPED | OUTPATIENT
Start: 2022-09-23 | End: 2022-10-18 | Stop reason: SDUPTHER

## 2022-09-23 NOTE — PROGRESS NOTES
CHIEF COMPLAINT  F/U BACK PAIN- L4 TRANSFORAMINAL LUMBAR EPIDURAL STEROID INJECTION- patient states that his pain was improved 90% since the procedure. He states that it has improved the leg pain but not the bag pain.     Subjective   Colby Alvarado is a 56 y.o. male  who presents to the office for follow-up of procedure.  He completed a bilateral L4 LTFESI   on  8/23/2022 performed by Dr. Aguayo for management of Back and leg pain. Patient reports 90% relief of radicular complaints no relief of back pain.     Pain today 6/10 VAS.  Maintained on Hydrocodone 10/325 5/day.  Also utilizes Flexeril, Naproxen 500 mg.  He reports significant relief with the medication, allows him to work and function.  He denies any adverse reactions.      Procedures ---   SHAY   on  5/19/2022 --- 50% ongoing relief   SHAY 4/5/2022 with Dr. Aguayo --- 80% relief for a couple of weeks    .  He states that work is aware that he takes pain medication     Neck Pain   This is a chronic problem. The current episode started more than 1 year ago. The problem occurs daily. The problem has been waxing and waning. The pain is present in the left side, midline and right side. The quality of the pain is described as aching and burning. The pain is at a severity of 6/10. The symptoms are aggravated by position and twisting. Associated symptoms include headaches (cluster). Pertinent negatives include no chest pain, fever, numbness or weakness. He has tried NSAIDs, acetaminophen, neck support, muscle relaxants and oral narcotics for the symptoms. The treatment provided mild relief.   Back Pain  The current episode started more than 1 month ago. The problem occurs daily. The problem has been gradually worsening since onset. The pain is present in the lumbar spine. The quality of the pain is described as aching and burning. The pain radiates to the left thigh and right thigh (bilateral hips, lateral legs). The pain is at a severity  "of 6/10. Exacerbated by: work. Associated symptoms include headaches (cluster). Pertinent negatives include no abdominal pain, chest pain, dysuria, fever, numbness or weakness. He has tried NSAIDs and analgesics for the symptoms. The treatment provided mild relief.      PEG Assessment   What number best describes your pain on average in the past week?8  What number best describes how, during the past week, pain has interfered with your enjoyment of life?4  What number best describes how, during the past week, pain has interfered with your general activity?  4    Review of Pertinent Medical Data ---  MRI LUMBAR      The following portions of the patient's history were reviewed and updated as appropriate: allergies, current medications, past family history, past medical history, past social history, past surgical history and problem list.    Review of Systems   Constitutional: Positive for fatigue. Negative for activity change, chills and fever.   HENT: Negative for congestion.    Eyes: Negative for visual disturbance.   Respiratory: Negative for chest tightness and shortness of breath.    Cardiovascular: Negative for chest pain.   Gastrointestinal: Negative for abdominal pain, constipation and diarrhea.   Genitourinary: Negative for difficulty urinating and dysuria.   Musculoskeletal: Positive for back pain and neck pain.   Neurological: Positive for headaches (cluster). Negative for dizziness, weakness, light-headedness and numbness.   Psychiatric/Behavioral: Positive for sleep disturbance. Negative for agitation. The patient is not nervous/anxious.      I have reviewed and confirmed the accuracy of the ROS as documented by the MA/LPN/RN LUIS EDUARDO Mendoza     Vitals:    09/23/22 0804   BP: 148/79   Pulse: 84   Temp: 98.2 °F (36.8 °C)   SpO2: 97%   Weight: 92.8 kg (204 lb 9.6 oz)   Height: 177.8 cm (70\")   PainSc:   6   PainLoc: Back         Objective   Physical Exam  Vitals and nursing note reviewed. "   Constitutional:       General: He is not in acute distress.     Appearance: Normal appearance. He is not ill-appearing.   Pulmonary:      Effort: Pulmonary effort is normal. No respiratory distress.   Musculoskeletal:      Cervical back: Tenderness and bony tenderness present. Pain with movement present.      Lumbar back: Tenderness and bony tenderness present. Normal range of motion.      Comments: +lumbar facet tenderness/loading    Skin:     General: Skin is warm and dry.   Neurological:      Mental Status: He is alert and oriented to person, place, and time.      Motor: Motor function is intact. No weakness.      Gait: Gait normal.   Psychiatric:         Mood and Affect: Mood normal.         Behavior: Behavior normal.       Assessment & Plan   Diagnoses and all orders for this visit:    1. DDD (degenerative disc disease), lumbar (Primary)  -     HYDROcodone-acetaminophen (NORCO)  MG per tablet; Take 1 tablet by mouth 5 (Five) Times a Day. Partial Supply  Dispense: 150 tablet; Refill: 0    2. Displacement of cervical intervertebral disc without myelopathy    3. Cervical radiculopathy    4. High risk medication use    5. Lumbar radiculitis  -     HYDROcodone-acetaminophen (NORCO)  MG per tablet; Take 1 tablet by mouth 5 (Five) Times a Day. Partial Supply  Dispense: 150 tablet; Refill: 0      --- Bilateral L3-L5 MBB    -------  Education about Medial Branch Blockade and RF Therapy:    This medial branch blockade (MBB) suggested is intended for diagnostic purposes, with the intent of offering the patient Radiofrequency thermal rhizotomy (RF) if the MBB is diagnostically effective.  The diagnostic blockade is necessary to determine the likelihood that RF therapy could be efficacious in providing long term relief to the patient.    Medial branches are sensory nerve branches that connect to a facet joint and transmit sensations & pain signals from that joint.  Facet is a term for the type of joints found  "in the spine.  Medial branches are the nerves that go to a facet, and therefore are also sometimes called \"facet joint nerves\" (FJNs).      In a medial branch blockade procedure, xray fluoroscopy is used to verify the locations of the outside of the joint lines which are being targeted.  Under xray guidance, needles are placed to these areas.  Contrast dye is injected to confirm proper placement, with dye flowing over the joint area, and to ensure that the dye does not flow into unintended areas such as a vein.  When this is confirmed, local anesthetic is injected to block the medial branch at that joint level.      If MBBs are diagnostically successful in blocking pain, then the patient is most likely a great candidate for Radiofrequency of those facet joint nerves.  In the RF procedure, needles are placed to the joint lines in the same fashion, and after testing, the needle tips are heated to thermally treat the nerves, blocking the nerves by in essence damaging the nerves with the heat treatment(non-pulsed).       Medically, a successful RF procedure should provide a patient with 50% pain relief or more for at least 6 months.  Clinical experience suggests that successful patients receive relief more in the range of 12 months on average.  We also discussed that a fortunate minority of patients receive therapeutic success from the MBB, and may not require RF ablation.  If a patient receives more than 8 weeks of relief from MBB, then occasional repeat MBB for therapeutic purposes is a very reasonable alternative therapy.  This course of therapy is consistent with our LCDs according to our CMS  in the area, and therefore other insurance providers should follow accordingly.  We will monitor our patients to screen for these therapeutic responders and will offer RF therapy only when necessary.      We discussed that MBB & RF are not without risks.  Guidelines regarding anticoagulant use & neuraxial " procedures will be respected.  Patients that are ill or otherwise may be at risk for sepsis will not have their spines accessed by neuraxial injections of any type.  This patient will not be offered these therapies if there is an increased risk.   We discussed that there is a risk of postprocedural pain and also a risk of worsening of clinical picture with these procedures as with any neuraxial procedure.    -------    --- Refill Hydrocodone. Patient appears stable with current regimen. No adverse effects. Regarding continuation of opioids, there is no evidence of aberrant behavior or any red flags.  The patient continues with appropriate response to opioid therapy. ADL's remain intact by self.   --- The urine drug screen confirmation from 6/10/2022 has been reviewed and the result is appropriate based on patient history and CARI report  --- The patient signed an updated copy of the controlled substance agreement on 6/10/2022  --- Risk of abuse/misuse of opioids --- Low  --- Follow-up for procedure and 1 week post                CARI REPORT  As part of the patient's treatment plan, I am prescribing controlled substances. The patient has been made aware of appropriate use of such medications, including potential risk of somnolence, limited ability to drive and/or work safely, and the potential for dependence or overdose. It has also been made clear that these medications are for use by this patient only, without concomitant use of alcohol or other substances unless prescribed.     Patient has completed prescribing agreement detailing terms of continued prescribing of controlled substances, including monitoring CARI reports, urine drug screening, and pill counts if necessary. The patient is aware that inappropriate use will results in cessation of prescribing such medications.    As the clinician, I personally reviewed the CARI from 9/23/2022 while the patient was in the office today.    History and physical  exam exhibit continued safe and appropriate use of controlled substances.       Dictated utilizing Dragon dictation.      This document is intended for medical expert use only. Reading of this document by patients and/or patient's family without participating medical staff guidance may result in misinterpretation and unintended morbidity.   Any interpretation of such data is the responsibility of the patient and/or family member responsible for the patient in concert with their primary or specialist providers, not to be left for sources of online searches such as [employer], [employer] or similar queries. Relying on these approaches to knowledge may result in misinterpretation, misguided goals of care and even death should patients or family members try recommendations outside of the realm of professional medical care in a supervised way.    Patient remained masked during entire encounter. No cough present. I donned a mask and eye protection throughout entire visit. Prior to donning mask and eye protection, hand hygiene was performed, as well as when it was doffed.  I was closer than 6 feet, but not for an extended period of time. No obvious exposure to any bodily fluids.

## 2022-10-18 DIAGNOSIS — M54.16 LUMBAR RADICULITIS: ICD-10-CM

## 2022-10-18 DIAGNOSIS — M51.36 DDD (DEGENERATIVE DISC DISEASE), LUMBAR: ICD-10-CM

## 2022-10-18 RX ORDER — HYDROCODONE BITARTRATE AND ACETAMINOPHEN 10; 325 MG/1; MG/1
1 TABLET ORAL
Qty: 150 TABLET | Refills: 0 | Status: SHIPPED | OUTPATIENT
Start: 2022-10-18 | End: 2022-11-18 | Stop reason: SDUPTHER

## 2022-10-18 NOTE — TELEPHONE ENCOUNTER
Caller: NONA Sahu call back number: [phone]  Requested Prescriptions:   Requested Prescriptions     Pending Prescriptions Disp Refills   • HYDROcodone-acetaminophen (NORCO)  MG per tablet 150 tablet 0     Sig: Take 1 tablet by mouth 5 (Five) Times a Day. Partial Supply        Pharmacy where request should be sent: Pharmacy:  [facility][id number] [place] [id number] [address] [phone] [facility] [phone] [facility]   Does the patient have less than a 3 day supply:  [] Yes  [x] No    Jak Veras Rep   10/18/22 12:52 EDT

## 2022-10-18 NOTE — TELEPHONE ENCOUNTER
Please make sure he schedules an appointment 1 week post his upcoming mbb. No later than 11/22/2022 (goal to sync with next refill). Once that is done, I will refill his medication.

## 2022-11-09 RX ORDER — MOLNUPIRAVIR 200 MG/1
CAPSULE ORAL
COMMUNITY

## 2022-11-10 ENCOUNTER — HOSPITAL ENCOUNTER (OUTPATIENT)
Facility: SURGERY CENTER | Age: 57
Setting detail: HOSPITAL OUTPATIENT SURGERY
Discharge: HOME | End: 2022-11-10
Attending: ANESTHESIOLOGY | Admitting: ANESTHESIOLOGY

## 2022-11-10 ENCOUNTER — HOSPITAL ENCOUNTER (OUTPATIENT)
Dept: GENERAL RADIOLOGY | Facility: SURGERY CENTER | Age: 57
Setting detail: HOSPITAL OUTPATIENT SURGERY
End: 2022-11-10

## 2022-11-10 VITALS
OXYGEN SATURATION: 95 % | DIASTOLIC BLOOD PRESSURE: 77 MMHG | BODY MASS INDEX: 29.2 KG/M2 | HEIGHT: 70 IN | HEART RATE: 67 BPM | WEIGHT: 204 LBS | SYSTOLIC BLOOD PRESSURE: 137 MMHG | RESPIRATION RATE: 18 BRPM | TEMPERATURE: 97.7 F

## 2022-11-10 DIAGNOSIS — M47.816 LUMBAR FACET ARTHROPATHY: ICD-10-CM

## 2022-11-10 DIAGNOSIS — Z41.9 SURGERY, ELECTIVE: ICD-10-CM

## 2022-11-10 PROCEDURE — 77002 NEEDLE LOCALIZATION BY XRAY: CPT

## 2022-11-10 PROCEDURE — 25010000002 IOPAMIDOL 61 % SOLUTION: Performed by: ANESTHESIOLOGY

## 2022-11-10 PROCEDURE — 64494 INJ PARAVERT F JNT L/S 2 LEV: CPT | Performed by: ANESTHESIOLOGY

## 2022-11-10 PROCEDURE — 64493 INJ PARAVERT F JNT L/S 1 LEV: CPT | Performed by: ANESTHESIOLOGY

## 2022-11-10 PROCEDURE — 76000 FLUOROSCOPY <1 HR PHYS/QHP: CPT

## 2022-11-10 PROCEDURE — S0260 H&P FOR SURGERY: HCPCS | Performed by: ANESTHESIOLOGY

## 2022-11-10 RX ORDER — SODIUM CHLORIDE 0.9 % (FLUSH) 0.9 %
10 SYRINGE (ML) INJECTION EVERY 12 HOURS SCHEDULED
Status: DISCONTINUED | OUTPATIENT
Start: 2022-11-10 | End: 2022-11-10 | Stop reason: HOSPADM

## 2022-11-10 RX ORDER — SODIUM CHLORIDE 0.9 % (FLUSH) 0.9 %
10 SYRINGE (ML) INJECTION AS NEEDED
Status: DISCONTINUED | OUTPATIENT
Start: 2022-11-10 | End: 2022-11-10 | Stop reason: HOSPADM

## 2022-11-10 NOTE — DISCHARGE INSTRUCTIONS
[facility] - Post-procedure Instructions          --  While there are no absolute restrictions, it is recommended that you do not perform strenuous activity today. In the morning, you may resume your level of activity as before your block.    --  If you have a band-aid at your injection site, please remove it later today. Observe the area for any redness, swelling, pus-like drainage, or a temperature over 101°. If any of these symptoms occur, please call your doctor at [phone]. If after office hours, leave a message and the on-call provider will return your call.    --  Ice may be applied to your injection site. It is recommended you avoid direct heat (heating pad; hot tub) for 1-2 days.    --  Call [facility] at [phone] if you experience persistent headache, persistent bleeding from the injection site, or severe pain not relieved by heat or oral medication.    --  Do not make important decisions today.    --  Due to the effects of the block and/or the I.V. Sedation, DO NOT drive or operate hazardous machinery for 12 hours.  Local anesthetics may cause numbness after procedure and precautions must be taken with regards to operating equipment as well as with walking, even if ambulating with assistance of another person or with an assistive device.    --  Do not drink alcohol for 12 hours.    -- You may return to work tomorrow, or as directed by your referring doctor.    --  Occasionally you may notice a slight increase in your pain after the procedure. This should start to improve within the next 24-48 hours. Radiofrequency ablation procedure pain may last 3-4 weeks.    --  It may take as long as 3-4 days before you notice a gradual improvement in your pain and/or other symptoms.    -- You may continue to take your prescribed pain medication as needed.    --  Some normal possible side effects of steroid use could include fluid retention, increased blood sugar, dull headache,  increased sweating, increased appetite, mood swings and flushing.    --  Diabetics are recommended to watch their blood glucose level closely for 24-48 hours after the injection.    --  Must stay in PACU for 20 min upon arrival and prove no leg weakness before being discharged.    --  IN THE EVENT OF A LIFE THREATENING EMERGENCY, (CHEST PAIN, BREATHING DIFFICULTIES, PARALYSIS…) YOU SHOULD GO TO YOUR NEAREST EMERGENCY ROOM.    --  You should be contacted by our office within 2-3 days to schedule follow up or next appointment date.  If not contacted within 7 days, please call the office at [phone]

## 2022-11-10 NOTE — H&P
Brief Pre-procedural / Pre-operative H&P        -----    Pertinent Diagnosis:   Lumbar spondylosis.  Lumbar facet arthropathy    Proposed Procedure: Lumbar medial branch blockade      Subjective   Colby Alvarado is a 56 y.o. male  who presents for intervention.  He has a history of back pain.      History of Present Illness     He has a history of back pain also has a history of radicular symptoms.  His bilateral radicular symptoms have improved by 90% after transforaminal injections.  However transforaminal epidural injection gave him no relief of back pain.  He has some aching and burning elements that radiates posteriorly into the thighs.  He has displayed some facet tenderness and positive facet loading.    -------    The following portions of the patient's history were reviewed and updated as appropriate: allergies, current medications, past family history, past medical history, past social history, past surgical history and problem list.    No Known Allergies      Current Facility-Administered Medications:   •  sodium chloride 0.9 % flush 10 mL, 10 mL, Intravenous, Q12H, Cruz Aguayo MD  •  sodium chloride 0.9 % flush 10 mL, 10 mL, Intravenous, PRN, Cruz Aguayo MD    No current facility-administered medications on file prior to encounter.     Current Outpatient Medications on File Prior to Encounter   Medication Sig Dispense Refill   • atorvastatin (LIPITOR) 80 MG tablet Take 80 mg by mouth Every Night.     • cetirizine (zyrTEC) 10 MG tablet Take 10 mg by mouth Daily.     • cyclobenzaprine (FLEXERIL) 10 MG tablet Take 10 mg by mouth.     • ezetimibe (ZETIA) 10 MG tablet Take 10 mg by mouth every night at bedtime.     • lisinopril (PRINIVIL,ZESTRIL) 10 MG tablet Take 1 tablet by mouth 2 (two) times a day. 60 tablet 6   • metoprolol succinate XL (TOPROL-XL) 100 MG 24 hr tablet TAKE 1 TABLET BY MOUTH  DAILY MAY TAKE AN  ADDITIONAL 1/2 TABLET DAILY AS NEEDED 135 tablet 3   • SUMAtriptan Succinate  (IMITREX) 6 MG/0.5ML injection sumatriptan 6 mg/0.5 mL subcutaneous pen injector     • zolpidem (AMBIEN) 10 MG tablet Take 10 mg by mouth At Night As Needed for Sleep.     • Galcanezumab-gnlm (Emgality, 300 MG Dose,) 100 MG/ML solution prefilled syringe As Needed.     • Molnupiravir (Lagevrio) 200 MG capsule Lagevrio 200 mg capsule (EUA)   Take 4 capsules every 12 hours by oral route for 5 days.         Patient Active Problem List   Diagnosis   • Complete tear of right rotator cuff   • PAF (paroxysmal atrial fibrillation) ([facility])   • Essential hypertension   • PRYOR (dyspnea on exertion)   • Palpitations   • Precordial pain   • DDD (degenerative disc disease), cervical   • Cervical radiculopathy   • Lumbar radiculitis   • DDD (degenerative disc disease), lumbar   • Lumbar facet arthropathy       Past Medical History:   Diagnosis Date   • Anxiety    • Arthritis of back    • Atrial fibrillation (HCC)    • Chronic pain    • Depression    • H/O cluster headache    • Hearing loss    • Hyperlipidemia    • Hypertension    • Insomnia    • Periarthritis of shoulder    • Seasonal allergies    • Sleep apnea     using CPAP       Past Surgical History:   Procedure Laterality Date   • BACK SURGERY     • CARDIAC ABLATION  07/31/2019   • CERVICAL EPIDURAL Bilateral 4/5/2022    Procedure: CERVICAL EPIDURAL;  Surgeon: Cruz Aguayo MD;  Location: [facility] MAIN OR;  Service: Pain Management;  Laterality: Bilateral;   • CERVICAL EPIDURAL N/A 5/19/2022    Procedure: CERVICAL EPIDURAL;  Surgeon: Cruz Aguayo MD;  Location: [facility] MAIN OR;  Service: Pain Management;  Laterality: N/A;   • EPIDURAL Bilateral 8/23/2022    Procedure: L4 TRANSFORAMINAL LUMBAR EPIDURAL STEROID INJECTION;  Surgeon: Cruz Aguayo MD;  Location: [facility] MAIN OR;  Service: Pain Management;  Laterality: Bilateral;   • EYE MUSCLE SURGERY Left    • HAND SURGERY Right     open fracture plate   • HARDWARE REMOVAL Right     rt hand  plate   • HERNIA REPAIR     •  NASAL FRACTURE SURGERY     • SHOULDER ARTHROSCOPY W/ ROTATOR CUFF REPAIR Right 3/13/2019    Procedure: SHOULDER ARTHROSCOPY, DEBRIDEIMENT OF LABRUM AND SUBSCAP, DECEOMPRESSION, DISTAL CLAVICLE EXCISION,  WITH MINI OPEN ROTATOR CUFF REPAIR;  Surgeon: Estela Townsend MD;  Location: [facility] OR [facility] [place];  Service: Orthopedics   • SHOULDER SURGERY     • SPINE SURGERY     • VASECTOMY         Family History   Problem Relation Age of Onset   • Hypertension Other    • Heart disease Other    • Lung disease Other    • Emphysema Mother    • Hypertension Mother    • Heart disease Mother    • Cancer Mother         Oral   • Hypertension Sister    • No Known Problems Brother    • Heart attack Maternal Grandmother    • Emphysema Maternal Grandmother    • Heart disease Maternal Grandmother    • Stroke Maternal Grandmother    • Heart attack Maternal Grandfather    • Emphysema Maternal Grandfather    • Malig Hyperthermia Neg Hx        Social History     Socioeconomic History   • Marital status: Significant Other   Tobacco Use   • Smoking status: Former     Packs/day: 1.50     Years: 30.00     Pack years: 45.00     Types: Cigarettes     Quit date: 2021     Years since quittin.6   • Smokeless tobacco: Former     Types: Snuff   Vaping Use   • Vaping Use: Some days   • Substances: Nicotine   • Devices: Pre-filled or refillable cartridge   Substance and Sexual Activity   • Alcohol use: Yes     Alcohol/week: 16.0 standard drinks     Types: 16 Cans of beer per week     Comment: occ   • Drug use: No   • Sexual activity: Defer       -------       Review of Systems  No Fever, No Chills, No ear pain, No sinus pressure or drainage, No eye pain or drainage, No cough, No SOB, No chest tightness nor chest pain, no palpitations.          Vitals:    22 1359 11/10/22 0711 11/10/22 0811   BP:  139/71 126/77   BP Location:  Left arm    Patient Position:  Sitting    Pulse:  72 74   Resp:  16 16   Temp:  97.5 °F (36.4 °C)    TempSrc:  Temporal   "  SpO2:  98% 94%   Weight: 92.5 kg (204 lb) 92.5 kg (204 lb)    Height: 177.8 cm (70\") 177.8 cm (70\")          Objective   Physical Exam  VSS, NNR, NCAT, NMNA, NRD, AAOx3.    As above  -------    Assessment & Plan:  - as noted above, the stated intervention is indicated  - Follow-up plan will be noted in the operative report        Has a follow-up on the 18th      EMR Dragon/Transcription disclaimer:   Typed items in this encounter note may have been created by electronic transcription/translation software which converts spoken language to printed text. The electronic translation of spoken language may permit erroneous, or at times, nonsensical words or phrases to be inadvertently transcribed; Although I have reviewed the note for such errors, some may still exist.    "

## 2022-11-10 NOTE — OP NOTE
Bilateral L3-5 Lumbar Medial Branch Blockade  [facility]      PREOPERATIVE DIAGNOSIS:  Lumbar spondylosis without myelopathy    POSTOPERATIVE DIAGNOSIS:  Lumbar spondylosis without myelopathy    PROCEDURE:   Diagnostic Bilateral Lumbar Medial Branch Nerve Blockades, with fluoroscopy:  L3, L4, and L5 nerves (at the L4 & L5 transverse processes and the sacral alar groove) to block facet joints L4-5, and L5-S1  1. 58847-78 -- Bilateral Lumbar Facet blocks, 1st Level  2. 80445-15 -- Bilateral Lumbar Facet blocks, 2nd  Level    PRE-PROCEDURE DISCUSSION WITH PATIENT:    Risks and complications were discussed with the patient prior to starting the procedure and informed consent was obtained.      SURGEON:  Cruz Aguayo MD    REASON FOR PROCEDURE:    The patient complains of pain that seems to have a significant axial component and Tenderness of the affected facet joints on exam under fluoroscopy    SEDATION:  Patient declined administration of moderate sedation    ANESTHETIC:  Marcaine 0.5%  STEROID:  NONE  TOTAL VOLUME OF SOLUTION:  6ml    DESCRIPTON OF PROCEDURE:  After obtaining informed consent, IV access was not obtained in the preoperative area.   The patient was taken to the operating room.  The patient was placed in the prone position with a pillow under the abdomen. All pressure points were well padded.  EKG, blood pressure, and pulse oximeter were monitored.  The patient was monitored and sedated by the RN under my direction. The lumbosacral area was prepped with Chloraprep and draped in a sterile fashion. Under fluoroscopic guidance the transverse processes of the L4 and L5 vertebrae at the junctions of the superior articular processes were identified on the right. Also identified was the groove between the ala and the superior articular process of the sacrum on the ipsilateral side.  Skin and subcutaneous tissue were anesthetized with 1% lidocaine above each of these points. A 22-gauge  spinal needle was introduced under fluoroscopic guidance at the above junctions. Aspiration was negative for blood and CSF.  After confirming the position of the needle with fluoroscope in all views, 0.25 mL of Omnipaque was injected, and after seeing the proper spread a total of 1 mL of the anesthetic solution noted above was injected at each of these points.  Needles were removed intact from each of the areas.  A similar procedure was repeated to block the L3, L4, and L5 nerves on the contralateral side.   Onset of analgesia was noted.  Vital signs remained stable throughout.      ESTIMATED BLOOD LOSS:  <5 mL  SPECIMENS:  none    COMPLICATIONS:   No complications were noted., There was no indication of vascular uptake on live injection of contrast dye., There was no indication of intrathecal uptake on live injection of contrast dye., There was not any evidence of dural puncture.   and The patient did not have any signs of postprocedure numbness nor weakness.    TOLERANCE & DISCHARGE CONDITION:    The patient tolerated the procedure well.  The patient was transported to the recovery area without difficulties.  The patient was discharged to home under the care of family in stable and satisfactory condition.    PLAN OF CARE:  1. The patient was given our standard instruction sheet.  2. We discussed that Lumbar Medial Branch Blockade is a diagnostic procedure in consideration for radiofrequency ablation if two diagnostic procedures prove to be positive for significant benefit.  If sustained relief of 6 to eight weeks is obtained, then an alternative plan could be therapeutic lumbar branch blockades.  3. The patient is asked to keep a pain log each hour for 8 hours after the procedure today.  4. The patient will  Return to clinic 1 wk.  5. The patient will resume all medications as per the medication reconciliation sheet.

## 2022-11-18 ENCOUNTER — OFFICE VISIT (OUTPATIENT)
Dept: PAIN MEDICINE | Facility: CLINIC | Age: 57
End: 2022-11-18

## 2022-11-18 ENCOUNTER — PREP FOR SURGERY (OUTPATIENT)
Dept: SURGERY | Facility: SURGERY CENTER | Age: 57
End: 2022-11-18

## 2022-11-18 VITALS
TEMPERATURE: 98.7 F | WEIGHT: 211.8 LBS | BODY MASS INDEX: 30.32 KG/M2 | HEIGHT: 70 IN | HEART RATE: 97 BPM | SYSTOLIC BLOOD PRESSURE: 132 MMHG | OXYGEN SATURATION: 97 % | DIASTOLIC BLOOD PRESSURE: 73 MMHG

## 2022-11-18 DIAGNOSIS — M54.16 LUMBAR RADICULITIS: ICD-10-CM

## 2022-11-18 DIAGNOSIS — M47.816 LUMBAR FACET ARTHROPATHY: Primary | ICD-10-CM

## 2022-11-18 DIAGNOSIS — M51.36 DDD (DEGENERATIVE DISC DISEASE), LUMBAR: Primary | ICD-10-CM

## 2022-11-18 DIAGNOSIS — Z79.899 HIGH RISK MEDICATION USE: ICD-10-CM

## 2022-11-18 DIAGNOSIS — M50.20 DISPLACEMENT OF CERVICAL INTERVERTEBRAL DISC WITHOUT MYELOPATHY: ICD-10-CM

## 2022-11-18 DIAGNOSIS — M54.12 CERVICAL RADICULOPATHY: ICD-10-CM

## 2022-11-18 PROCEDURE — 99214 OFFICE O/P EST MOD 30 MIN: CPT

## 2022-11-18 RX ORDER — SODIUM CHLORIDE 0.9 % (FLUSH) 0.9 %
10 SYRINGE (ML) INJECTION AS NEEDED
Status: CANCELLED | OUTPATIENT
Start: 2022-11-18

## 2022-11-18 RX ORDER — SODIUM CHLORIDE 0.9 % (FLUSH) 0.9 %
10 SYRINGE (ML) INJECTION EVERY 12 HOURS SCHEDULED
Status: CANCELLED | OUTPATIENT
Start: 2022-11-18

## 2022-11-18 RX ORDER — HYDROCODONE BITARTRATE AND ACETAMINOPHEN 10; 325 MG/1; MG/1
1 TABLET ORAL
Qty: 150 TABLET | Refills: 0 | Status: SHIPPED | OUTPATIENT
Start: 2022-11-18 | End: 2022-12-20 | Stop reason: SDUPTHER

## 2022-11-18 NOTE — PROGRESS NOTES
CHIEF COMPLAINT  Back pain    Subjective   Colby Alvarado is a 56 y.o. male  who presents to the office for follow-up of procedure.  He completed a bilateral L3-L5 MBB on 11/10/22 performed by Dr. Aguayo for management of low back pain. Patient reports 80% relief from the procedure for a few hours. He was able to walk/stand for longer periods of time with less pain following the procedure.     Today pain is 7/10VAS in severity. Pain is located in his neck and lower back. Back pain is greater than neck pain. Describes this pain as an intermittent aching and burning. Pain is worsened by certain movements, prolonged positions (standing), bending/twisting, and walking long distances. Pain improves with rest, reposition, and medication.      Continues with Hydrocodone 10mg 5/day. He also takes Flexeril and Naproxen PRN. Denies any side effects from the regimen, including constipation and somnolence. The regimen helps decrease pain by a moderate amount. He works as a  and this helps gets through the day. ADL's by self. Denies any bowel or bladder changes.      Procedures:  11/10/22 - bilateral L3-L5 MBB - 80% relief x a few hours  8/23/22 - bilateral L4 LTFESI - 90% relief of radicular symptoms - no relief of back pain  5/19/22 - SHAY - 50% ongoing relief  4/5/22 - SHAY - 80% relief x a couple of weeks     He has a history of lumbar spine surgery 15 years ago with Dr. Moon.     Neck Pain   This is a chronic problem. The current episode started more than 1 year ago. The problem occurs daily. The problem has been unchanged (unchanged since last visit). The pain is present in the left side, midline and right side. The quality of the pain is described as aching and burning. The pain is at a severity of 5/10. The symptoms are aggravated by position and twisting. Associated symptoms include headaches (migraines) and numbness (shoulder, hands). Pertinent negatives include no chest pain, fever or weakness.  He has tried NSAIDs, acetaminophen, neck support, muscle relaxants and oral narcotics for the symptoms. The treatment provided mild relief.   Back Pain  The current episode started more than 1 month ago. The problem occurs daily. The problem has been waxing and waning since onset. The pain is present in the lumbar spine. The quality of the pain is described as aching and burning. The pain radiates to the left thigh and right thigh (bilateral hips, lateral legs). The pain is at a severity of 7/10. The symptoms are aggravated by lying down, standing and sitting (work, prolonged position, lifting). Associated symptoms include headaches (migraines) and numbness (shoulder, hands). Pertinent negatives include no abdominal pain, chest pain, dysuria, fever or weakness. He has tried NSAIDs and analgesics for the symptoms. The treatment provided mild relief.      PEG Assessment   What number best describes your pain on average in the past week?8  What number best describes how, during the past week, pain has interfered with your enjoyment of life?5  What number best describes how, during the past week, pain has interfered with your general activity?  7    Review of Pertinent Medical Data ---  MRI OF THE LUMBAR SPINE WITH AND WITHOUT CONTRAST     CLINICAL HISTORY: Degenerative disc disease. Bilateral leg pain. History  of L3-L4 discectomy.     TECHNIQUE: MRI of the lumbar spine was obtained with sagittal pre and  postgadolinium T1, proton-density, and T2-weighted images. Additionally,  there are axial pre and postgadolinium T1 and T2-weighted images through  the lumbar spine.     COMPARISON: Comparison is made to previous outside MRI of the lumbar  spine from [facility] and [facility] dated 01/09/2013.     FINDINGS:     The conus medullaris terminates at the T12-L1 level and has normal  signal intensity. The visualized distal thoracic spinal cord is  unremarkable.     At T12-L1, there is no significant canal or foraminal  stenosis.     At L1-L2, there are degenerative disc changes with adjacent degenerative  endplate marrow edema. Bulging disc material at L1-L2 results in a mild  degree of canal and foraminal narrowing. All of these changes are new  when compared to the prior exam.     At L2-L3, there is a disc bulge which mildly narrows the neural foramina  bilaterally. No significant canal stenosis is identified. The mild  foraminal narrowing is new when compared to the prior exam. A right  posterior lateral annular fissure is identified and new since the prior  study.     At the L3-L4 level, the patient has undergone an interval left  laminectomy and partial facetectomy procedure. There is a disc bulge  with a right posterior lateral annular fissure that is eccentric to the  left and results in a mild degree of canal narrowing, particularly along  the left side of the spinal canal. On the prior study, there was a left  central disc protrusion which is no longer evident. Bulging disc  material also results in mild foraminal narrowing. Although the left  central disc protrusion is no longer evident, the degree of canal  narrowing is mildly improved with resection of the left central disc  protrusion. The degree of foraminal narrowing is stable.     At L4-L5, there are degenerative disc changes with adjacent degenerative  endplate marrow edema. There is degenerative retrolisthesis of L4 on L5  by approximately 3 mm. There is a new posterior annular fissure at  L4-L5. Bulging disc material at L4-L5 results in a mild-to-moderate  degree of bilateral foraminal narrowing. Mild canal narrowing and left  lateral recess narrowing is appreciated secondary to a disc bulge. When  compared to the prior study, the degenerative disc changes and  degenerative endplate marrow edema is new. The mild degree of canal  narrowing and mild-to-moderate degree of foraminal narrowing is slightly  more pronounced when compared to the prior study.     At  L5-S1, there is a disc osteophyte complex which mildly narrows the  neural foramina and these findings are unchanged. A posterior annular  fissure is noted and is new since the prior study.     IMPRESSION:     When compared to the prior exam dated 01/09/2013, at L1-L2, there are  new degenerative disc changes with new adjacent degenerative endplate  marrow edema. Mild canal and foraminal narrowing at the L1-L2 level is  also new.     In the interval since the prior study, the patient has undergone a left  laminectomy and partial facetectomy procedure at the L3-L4 level. There  is a disc bulge eccentric to the left at L3-L4 which results in a mild  degree of canal and foraminal narrowing. The degree of canal narrowing  is mildly improved in the interval with interval resection of the  previously identified left central disc protrusion. The degree of mild  foraminal narrowing at L3-L4 is stable.     At L4-L5, there is progression of the degenerative disc change with new  areas of degenerative endplate marrow edema. Degenerative retrolisthesis  of L4 on L5 by approximately 3 mm is noted and is stable. A new  posterior annular fissure is seen at the L4-L5 level. Bulging disc  material results in a mild-to-moderate degree of bilateral foraminal  narrowing and a mild degree of canal narrowing which is more pronounced  when compared to the prior exam.     At L5-S1, a new posterior annular fissure is identified. There are  stable mild bilateral foraminal narrowing secondary to a disc osteophyte  complex.     The remaining degenerative phenomena within the lumbar spine are as  discussed in detail above.     This report was finalized on 7/6/2022 12:25 PM by Dr. Jose Harrell M.D.    The following portions of the patient's history were reviewed and updated as appropriate: allergies, current medications, past family history, past medical history, past social history, past surgical history and problem list.    Review of Systems  "  Constitutional: Negative for activity change, chills, fatigue and fever.   HENT: Negative for congestion.    Eyes: Negative for visual disturbance.   Respiratory: Negative for chest tightness and shortness of breath.    Cardiovascular: Negative for chest pain.   Gastrointestinal: Negative for abdominal pain, constipation and diarrhea.   Genitourinary: Negative for difficulty urinating and dysuria.   Musculoskeletal: Positive for back pain and neck pain.   Neurological: Positive for numbness (shoulder, hands) and headaches (migraines). Negative for dizziness, weakness and light-headedness.   Psychiatric/Behavioral: Positive for sleep disturbance. Negative for agitation. The patient is not nervous/anxious.      I have reviewed and confirmed the accuracy of the ROS as documented by the MA/LPN/RN LUIS EDUARDO Leslie     Vitals:    11/18/22 0845   BP: 132/73   Pulse: 97   Temp: 98.7 °F (37.1 °C)   SpO2: 97%   Weight: 96.1 kg (211 lb 12.8 oz)   Height: 177.8 cm (70\")   PainSc:   7   PainLoc: Back     Objective   Physical Exam  Constitutional:       Appearance: Normal appearance.   HENT:      Head: Normocephalic.   Cardiovascular:      Rate and Rhythm: Normal rate and regular rhythm.   Pulmonary:      Effort: Pulmonary effort is normal.      Breath sounds: Normal breath sounds.   Musculoskeletal:      Cervical back: Normal range of motion. Tenderness and bony tenderness present. Pain with movement present.      Lumbar back: Tenderness and bony tenderness present. Decreased range of motion.   Skin:     General: Skin is warm and dry.      Capillary Refill: Capillary refill takes less than 2 seconds.   Neurological:      General: No focal deficit present.      Mental Status: He is alert and oriented to person, place, and time.   Psychiatric:         Mood and Affect: Mood normal.         Speech: Speech normal.         Behavior: Behavior normal.         Thought Content: Thought content normal.         Cognition and Memory: " "Cognition normal.       Assessment & Plan   Diagnoses and all orders for this visit:    1. DDD (degenerative disc disease), lumbar (Primary)  -     HYDROcodone-acetaminophen (NORCO)  MG per tablet; Take 1 tablet by mouth 5 (Five) Times a Day. 30 day supply  Dispense: 150 tablet; Refill: 0    2. Lumbar radiculitis  -     HYDROcodone-acetaminophen (NORCO)  MG per tablet; Take 1 tablet by mouth 5 (Five) Times a Day. 30 day supply  Dispense: 150 tablet; Refill: 0    3. Displacement of cervical intervertebral disc without myelopathy    4. Cervical radiculopathy    5. High risk medication use    --- Repeat Bilateral L3-L5 MBB #2  -------  Education about Medial Branch Blockade and RF Therapy:    This medial branch blockade (MBB) suggested is intended for diagnostic purposes, with the intent of offering the patient Radiofrequency thermal rhizotomy (RF) if the MBB is diagnostically effective.  The diagnostic blockade is necessary to determine the likelihood that RF therapy could be efficacious in providing long term relief to the patient.    Medial branches are sensory nerve branches that connect to a facet joint and transmit sensations & pain signals from that joint.  Facet is a term for the type of joints found in the spine.  Medial branches are the nerves that go to a facet, and therefore are also sometimes called \"facet joint nerves\" (FJNs).      In a medial branch blockade procedure, xray fluoroscopy is used to verify the locations of the outside of the joint lines which are being targeted.  Under xray guidance, needles are placed to these areas.  Contrast dye is injected to confirm proper placement, with dye flowing over the joint area, and to ensure that the dye does not flow into unintended areas such as a vein.  When this is confirmed, local anesthetic is injected to block the medial branch at that joint level.      If MBBs are diagnostically successful in blocking pain, then the patient is most likely " a great candidate for Radiofrequency of those facet joint nerves.  In the RF procedure, needles are placed to the joint lines in the same fashion, and after testing, the needle tips are heated to thermally treat the nerves, blocking the nerves by in essence damaging the nerves with the heat treatment(non-pulsed).       Medically, a successful RF procedure should provide a patient with 50% pain relief or more for at least 6 months.  Clinical experience suggests that successful patients receive relief more in the range of 12 months on average.  We also discussed that a fortunate minority of patients receive therapeutic success from the MBB, and may not require RF ablation.  If a patient receives more than 8 weeks of relief from MBB, then occasional repeat MBB for therapeutic purposes is a very reasonable alternative therapy.  This course of therapy is consistent with our LCDs according to our CMS  in the area, and therefore other insurance providers should follow accordingly.  We will monitor our patients to screen for these therapeutic responders and will offer RF therapy only when necessary.      We discussed that MBB & RF are not without risks.  Guidelines regarding anticoagulant use & neuraxial procedures will be respected.  Patients that are ill or otherwise may be at risk for sepsis will not have their spines accessed by neuraxial injections of any type.  This patient will not be offered these therapies if there is an increased risk.   We discussed that there is a risk of postprocedural pain and also a risk of worsening of clinical picture with these procedures as with any neuraxial procedure.    -------  --- The urine drug screen confirmation from 6/10/22 has been reviewed and the result is appropriate based on patient history and CARI report  --- The patient signed an updated copy of the controlled substance agreement on 6/10/22  --- Refill Hydrocodone. DNF 11/22/22. Patient appears stable with  current regimen. No adverse effects. Regarding continuation of opioids, there is no evidence of aberrant behavior or any red flags.  The patient continues with appropriate response to opioid therapy. ADL's remain intact by self.   --- Follow-up for procedure or 2 month OV     CARI REPORT  As part of the patient's treatment plan, I am prescribing controlled substances. The patient has been made aware of appropriate use of such medications, including potential risk of somnolence, limited ability to drive and/or work safely, and the potential for dependence or overdose. It has also been made clear that these medications are for use by this patient only, without concomitant use of alcohol or other substances unless prescribed.     Patient has completed prescribing agreement detailing terms of continued prescribing of controlled substances, including monitoring CARI reports, urine drug screening, and pill counts if necessary. The patient is aware that inappropriate use will results in cessation of prescribing such medications.    As the clinician, I personally reviewed the CARI from 11/18/22 while the patient was in the office today.    History and physical exam exhibit continued safe and appropriate use of controlled substances.    Dictated utilizing Dragon dictation.      Patient remained masked during entire encounter. No cough present. I donned a mask and eye protection throughout entire visit. Prior to donning mask and eye protection, hand hygiene was performed, as well as when it was doffed.  I was closer than 6 feet, but not for an extended period of time. No obvious exposure to any bodily fluids.

## 2022-11-29 ENCOUNTER — TRANSCRIBE ORDERS (OUTPATIENT)
Dept: SURGERY | Facility: SURGERY CENTER | Age: 57
End: 2022-11-29

## 2022-11-29 DIAGNOSIS — Z41.9 SURGERY, ELECTIVE: Primary | ICD-10-CM

## 2022-12-19 ENCOUNTER — TELEPHONE (OUTPATIENT)
Dept: PAIN MEDICINE | Facility: CLINIC | Age: 57
End: 2022-12-19

## 2022-12-19 DIAGNOSIS — M54.16 LUMBAR RADICULITIS: ICD-10-CM

## 2022-12-19 DIAGNOSIS — M51.36 DDD (DEGENERATIVE DISC DISEASE), LUMBAR: ICD-10-CM

## 2022-12-19 NOTE — TELEPHONE ENCOUNTER
Caller: LYUDMILA DENNY     Relationship: SELF     Best call back number:     Requested Prescriptions:   Requested Prescriptions      No prescriptions requested or ordered in this encounter      HYDROcodone-acetaminophen (NORCO)  MG     [facility]/pharmacy #[id number] [facility] [address]             Does the patient have less than a 3 day supply:  [] Yes  [x] No    Would you like a call back once the refill request has been completed: [x] Yes [] No    If the office needs to give you a call back, can they leave a voicemail: [x] Yes [] No    Jak Santiago Rep   12/19/22 16:05 EST

## 2022-12-20 RX ORDER — HYDROCODONE BITARTRATE AND ACETAMINOPHEN 10; 325 MG/1; MG/1
1 TABLET ORAL
Qty: 150 TABLET | Refills: 0 | Status: SHIPPED | OUTPATIENT
Start: 2022-12-20 | End: 2023-01-16 | Stop reason: SDUPTHER

## 2022-12-20 NOTE — TELEPHONE ENCOUNTER
Reviewed last office visit on 11/18/2022. WEST and CARI reviewed and are appropriate. Due to provider being out of office, will refill appropriately.

## 2022-12-20 NOTE — TELEPHONE ENCOUNTER
Medication Refill Request    Date of phone call: 12/20/2022    Medication being requested: norco  mg sig: Take 1 tablet by mouth 5 (Five) Times a Day. 30 day supply,  Qty: 150    Date of last visit: 11/18/2022    Date of last refill:     CARI up to date?:     Next Follow up?: 02/09/2023    Any new pertinent information? (i.e, new medication allergies, new use of medications, change in patient's health or condition, non-compliance or inconsistency with prescribing agreement?):

## 2023-01-16 ENCOUNTER — TELEPHONE (OUTPATIENT)
Dept: PAIN MEDICINE | Facility: CLINIC | Age: 58
End: 2023-01-16
Payer: COMMERCIAL

## 2023-01-16 DIAGNOSIS — M51.36 DDD (DEGENERATIVE DISC DISEASE), LUMBAR: ICD-10-CM

## 2023-01-16 DIAGNOSIS — M54.16 LUMBAR RADICULITIS: ICD-10-CM

## 2023-01-16 NOTE — TELEPHONE ENCOUNTER
Medication Refill Request    Date of phone call: 23    Medication being requested: Norco 10-35 mg    si tab po fives times a day prn  Qty: 150    Date of last visit: 22    Date of last refill:     CARI up to date?:     Next Follow up?: 23    Any new pertinent information? (i.e, new medication allergies, new use of medications, change in patient's health or condition, non-compliance or inconsistency with prescribing agreement?):

## 2023-01-16 NOTE — TELEPHONE ENCOUNTER
Caller: LYUDMILA DENNY    Relationship: SELF    Best call back number: [phone]    Requested Prescriptions:   HYDROcodone-acetaminophen (NORCO)  MG per tablet    Pharmacy where request should be sent:  [id number] [place] [facility] [address] - [phone]  - [phone] FX      Additional details provided by patient: N/A    Does the patient have less than a 3 day supply:  [] Yes  [x] No    Would you like a call back once the refill request has been completed: [x] Yes [] No    If the office needs to give you a call back, can they leave a voicemail: [x] Yes [] No    Jak Ding Rep   01/16/23 15:06 EST

## 2023-01-17 RX ORDER — HYDROCODONE BITARTRATE AND ACETAMINOPHEN 10; 325 MG/1; MG/1
1 TABLET ORAL
Qty: 150 TABLET | Refills: 0 | Status: SHIPPED | OUTPATIENT
Start: 2023-01-17 | End: 2023-02-21 | Stop reason: SDUPTHER

## 2023-01-17 NOTE — TELEPHONE ENCOUNTER
Reviewed UDS and CARI. Both updated and appropriate. Refill appropriate.  DNF 1/21/23. Will need office visit before next refill. OV scheduled for 2/9/23.

## 2023-01-19 ENCOUNTER — APPOINTMENT (OUTPATIENT)
Dept: GENERAL RADIOLOGY | Facility: SURGERY CENTER | Age: 58
End: 2023-01-19
Payer: COMMERCIAL

## 2023-02-14 ENCOUNTER — TRANSCRIBE ORDERS (OUTPATIENT)
Dept: SURGERY | Facility: SURGERY CENTER | Age: 58
End: 2023-02-14
Payer: COMMERCIAL

## 2023-02-14 DIAGNOSIS — Z41.9 SURGERY, ELECTIVE: Primary | ICD-10-CM

## 2023-02-15 DIAGNOSIS — M51.36 DDD (DEGENERATIVE DISC DISEASE), LUMBAR: ICD-10-CM

## 2023-02-15 DIAGNOSIS — M54.16 LUMBAR RADICULITIS: ICD-10-CM

## 2023-02-15 NOTE — TELEPHONE ENCOUNTER
Caller: LYUDMILA    Relationship: SELF    Best call back number: [phone]    Requested Prescriptions:   Requested Prescriptions     Pending Prescriptions Disp Refills   • HYDROcodone-acetaminophen (NORCO)  MG per tablet 150 tablet 0     Sig: Take 1 tablet by mouth 5 (Five) Times a Day. 30 day supply        Pharmacy where request should be sent: [facility]/PHARMACY #[id number] - [facility] [address] [phone] [facility] [phone] FX       Does the patient have less than a 3 day supply:  [] Yes  [x] No        Belinda Centeno, PCT   02/15/23 15:48 EST

## 2023-02-17 RX ORDER — HYDROCODONE BITARTRATE AND ACETAMINOPHEN 10; 325 MG/1; MG/1
1 TABLET ORAL
Qty: 150 TABLET | Refills: 0 | OUTPATIENT
Start: 2023-02-17

## 2023-02-17 NOTE — TELEPHONE ENCOUNTER
Per patient he is unable to come in Monday due to work but states he has enough medication to last him up until the 2/21/2023.

## 2023-02-17 NOTE — TELEPHONE ENCOUNTER
Refill is due 2/20 but patient has an office visit on 2/21. Can you see if he can come in Monday? It looks like he had an appointment that day but canceled it. He has not been here since November.

## 2023-02-17 NOTE — TELEPHONE ENCOUNTER
Caller: Colby Alvarado    Relationship to patient: Self    Best call back number: [phone]    Patient is needing: PATIENT WAS CALLING TO FOLLOW UP WITH MARICEL ON THE MEDICATION. UNABLE TO WARM TRANSFER.

## 2023-02-21 ENCOUNTER — OFFICE VISIT (OUTPATIENT)
Dept: PAIN MEDICINE | Facility: CLINIC | Age: 58
End: 2023-02-21
Payer: COMMERCIAL

## 2023-02-21 VITALS
SYSTOLIC BLOOD PRESSURE: 146 MMHG | HEART RATE: 77 BPM | DIASTOLIC BLOOD PRESSURE: 76 MMHG | OXYGEN SATURATION: 99 % | HEIGHT: 70 IN | BODY MASS INDEX: 30.39 KG/M2 | TEMPERATURE: 98 F

## 2023-02-21 DIAGNOSIS — M50.20 DISPLACEMENT OF CERVICAL INTERVERTEBRAL DISC WITHOUT MYELOPATHY: ICD-10-CM

## 2023-02-21 DIAGNOSIS — M54.16 LUMBAR RADICULITIS: ICD-10-CM

## 2023-02-21 DIAGNOSIS — M62.838 MUSCLE SPASM: ICD-10-CM

## 2023-02-21 DIAGNOSIS — M79.672 LEFT FOOT PAIN: ICD-10-CM

## 2023-02-21 DIAGNOSIS — Z79.899 HIGH RISK MEDICATION USE: ICD-10-CM

## 2023-02-21 DIAGNOSIS — M51.36 DDD (DEGENERATIVE DISC DISEASE), LUMBAR: Primary | ICD-10-CM

## 2023-02-21 DIAGNOSIS — M54.12 CERVICAL RADICULOPATHY: ICD-10-CM

## 2023-02-21 PROCEDURE — 99214 OFFICE O/P EST MOD 30 MIN: CPT

## 2023-02-21 PROCEDURE — 80305 DRUG TEST PRSMV DIR OPT OBS: CPT

## 2023-02-21 RX ORDER — HYDROCODONE BITARTRATE AND ACETAMINOPHEN 10; 325 MG/1; MG/1
1 TABLET ORAL
Qty: 150 TABLET | Refills: 0 | Status: SHIPPED | OUTPATIENT
Start: 2023-02-21 | End: 2023-03-23 | Stop reason: SDUPTHER

## 2023-02-21 RX ORDER — CYCLOBENZAPRINE HCL 10 MG
10 TABLET ORAL 2 TIMES DAILY PRN
Qty: 60 TABLET | Refills: 0 | Status: SHIPPED | OUTPATIENT
Start: 2023-02-21 | End: 2023-03-23 | Stop reason: SDUPTHER

## 2023-02-21 NOTE — PROGRESS NOTES
CHIEF COMPLAINT  Back and neck pain    Subjective   Colby Alvarado is a 57 y.o. male  who presents for follow-up.  He has a history of chronic low back and neck pain. He reports that his pain as remained consistent since his last visit.     Today pain is 7/10VAS in severity. Pain is located in his neck and lower back. Back pain is greater than neck pain. Describes this pain as an intermittent aching and burning. Pain is worsened by certain movements, prolonged positions (standing), bending/twisting, and walking long distances. Pain improves with rest, reposition, and medication.      Continues with Hydrocodone 10mg 5/day. He also takes Flexeril and Naproxen PRN. Denies any side effects from the regimen, including constipation and somnolence. The regimen helps decrease pain by a moderate amount. He works as a  and this helps gets through the day. ADL's by self. Denies any bowel or bladder changes.     Patient is scheduled for a Bilateral L3-L5 MBB on 3/16/23 with Dr. Aguayo    Procedures:  11/10/22 - bilateral L3-L5 MBB - 80% relief x a few hours  8/23/22 - bilateral L4 LTFESI - 90% relief of radicular symptoms - no relief of back pain  5/19/22 - SHAY - 50% ongoing relief  4/5/22 - SHAY - 80% relief x a couple of weeks     He has a history of lumbar spine surgery 15 years ago with Dr. Moon.     Neck Pain   This is a chronic problem. The current episode started more than 1 year ago. The problem occurs daily. The problem has been waxing and waning. The pain is present in the left side, midline and right side. The quality of the pain is described as aching and burning. The pain is at a severity of 5/10. The symptoms are aggravated by position and twisting. Pertinent negatives include no chest pain, fever, headaches, numbness or weakness. He has tried NSAIDs, acetaminophen, neck support, muscle relaxants and oral narcotics for the symptoms. The treatment provided mild relief.   Back Pain  The  current episode started more than 1 month ago. The problem occurs daily. The problem is unchanged (unchanged since last office visit). The pain is present in the lumbar spine. The quality of the pain is described as aching and burning. The pain radiates to the left thigh and right thigh (bilateral hips, lateral legs). The pain is at a severity of 7/10. The symptoms are aggravated by lying down, standing and sitting (work, prolonged position, lifting). Pertinent negatives include no abdominal pain, chest pain, dysuria, fever, headaches, numbness or weakness. He has tried NSAIDs and analgesics for the symptoms. The treatment provided mild relief.      PEG Assessment   What number best describes your pain on average in the past week?8  What number best describes how, during the past week, pain has interfered with your enjoyment of life?6  What number best describes how, during the past week, pain has interfered with your general activity?  6    Review of Pertinent Medical Data ---  MRI OF THE LUMBAR SPINE WITH AND WITHOUT CONTRAST     CLINICAL HISTORY: Degenerative disc disease. Bilateral leg pain. History  of L3-L4 discectomy.     TECHNIQUE: MRI of the lumbar spine was obtained with sagittal pre and  postgadolinium T1, proton-density, and T2-weighted images. Additionally,  there are axial pre and postgadolinium T1 and T2-weighted images through  the lumbar spine.     COMPARISON: Comparison is made to previous outside MRI of the lumbar  spine from [facility] and [facility] dated 01/09/2013.     FINDINGS:     The conus medullaris terminates at the T12-L1 level and has normal  signal intensity. The visualized distal thoracic spinal cord is  unremarkable.     At T12-L1, there is no significant canal or foraminal stenosis.     At L1-L2, there are degenerative disc changes with adjacent degenerative  endplate marrow edema. Bulging disc material at L1-L2 results in a mild  degree of canal and foraminal narrowing. All of these  changes are new  when compared to the prior exam.     At L2-L3, there is a disc bulge which mildly narrows the neural foramina  bilaterally. No significant canal stenosis is identified. The mild  foraminal narrowing is new when compared to the prior exam. A right  posterior lateral annular fissure is identified and new since the prior  study.     At the L3-L4 level, the patient has undergone an interval left  laminectomy and partial facetectomy procedure. There is a disc bulge  with a right posterior lateral annular fissure that is eccentric to the  left and results in a mild degree of canal narrowing, particularly along  the left side of the spinal canal. On the prior study, there was a left  central disc protrusion which is no longer evident. Bulging disc  material also results in mild foraminal narrowing. Although the left  central disc protrusion is no longer evident, the degree of canal  narrowing is mildly improved with resection of the left central disc  protrusion. The degree of foraminal narrowing is stable.     At L4-L5, there are degenerative disc changes with adjacent degenerative  endplate marrow edema. There is degenerative retrolisthesis of L4 on L5  by approximately 3 mm. There is a new posterior annular fissure at  L4-L5. Bulging disc material at L4-L5 results in a mild-to-moderate  degree of bilateral foraminal narrowing. Mild canal narrowing and left  lateral recess narrowing is appreciated secondary to a disc bulge. When  compared to the prior study, the degenerative disc changes and  degenerative endplate marrow edema is new. The mild degree of canal  narrowing and mild-to-moderate degree of foraminal narrowing is slightly  more pronounced when compared to the prior study.     At L5-S1, there is a disc osteophyte complex which mildly narrows the  neural foramina and these findings are unchanged. A posterior annular  fissure is noted and is new since the prior study.     IMPRESSION:     When  compared to the prior exam dated 01/09/2013, at L1-L2, there are  new degenerative disc changes with new adjacent degenerative endplate  marrow edema. Mild canal and foraminal narrowing at the L1-L2 level is  also new.     In the interval since the prior study, the patient has undergone a left  laminectomy and partial facetectomy procedure at the L3-L4 level. There  is a disc bulge eccentric to the left at L3-L4 which results in a mild  degree of canal and foraminal narrowing. The degree of canal narrowing  is mildly improved in the interval with interval resection of the  previously identified left central disc protrusion. The degree of mild  foraminal narrowing at L3-L4 is stable.     At L4-L5, there is progression of the degenerative disc change with new  areas of degenerative endplate marrow edema. Degenerative retrolisthesis  of L4 on L5 by approximately 3 mm is noted and is stable. A new  posterior annular fissure is seen at the L4-L5 level. Bulging disc  material results in a mild-to-moderate degree of bilateral foraminal  narrowing and a mild degree of canal narrowing which is more pronounced  when compared to the prior exam.     At L5-S1, a new posterior annular fissure is identified. There are  stable mild bilateral foraminal narrowing secondary to a disc osteophyte  complex.     The remaining degenerative phenomena within the lumbar spine are as  discussed in detail above.     This report was finalized on 7/6/2022 12:25 PM by Dr. Jose Harrell M.D.    The following portions of the patient's history were reviewed and updated as appropriate: allergies, current medications, past family history, past medical history, past social history, past surgical history and problem list.    Review of Systems   Constitutional: Positive for fatigue. Negative for activity change, chills and fever.   HENT: Negative for congestion.    Eyes: Negative for visual disturbance.   Respiratory: Negative for chest tightness and  "shortness of breath.    Cardiovascular: Negative for chest pain.   Gastrointestinal: Negative for abdominal pain, constipation and diarrhea.   Genitourinary: Negative for difficulty urinating and dysuria.   Musculoskeletal: Positive for back pain and neck pain.   Neurological: Negative for dizziness, weakness, light-headedness, numbness and headaches.   Psychiatric/Behavioral: Positive for sleep disturbance. Negative for agitation. The patient is not nervous/anxious.      I have reviewed and confirmed the accuracy of the ROS as documented by the MA/LPN/RN LUIS EDUARDO Leslie    Vitals:    02/21/23 0758   BP: 146/76   Pulse: 77   Temp: 98 °F (36.7 °C)   SpO2: 99%   Height: 177.8 cm (70\")   PainSc:   7   PainLoc: Back     Objective   Physical Exam  Constitutional:       Appearance: Normal appearance.   HENT:      Head: Normocephalic.   Cardiovascular:      Rate and Rhythm: Normal rate and regular rhythm.   Pulmonary:      Effort: Pulmonary effort is normal.      Breath sounds: Normal breath sounds.   Musculoskeletal:      Cervical back: Normal range of motion. Tenderness and bony tenderness present. Pain with movement present.      Lumbar back: Tenderness and bony tenderness present. Decreased range of motion. Positive right straight leg raise test and positive left straight leg raise test.   Skin:     General: Skin is warm and dry.      Capillary Refill: Capillary refill takes less than 2 seconds.   Neurological:      General: No focal deficit present.      Mental Status: He is alert and oriented to person, place, and time.   Psychiatric:         Mood and Affect: Mood normal.         Speech: Speech normal.         Behavior: Behavior normal.         Thought Content: Thought content normal.         Cognition and Memory: Cognition normal.       Assessment & Plan   Diagnoses and all orders for this visit:    1. DDD (degenerative disc disease), lumbar (Primary)  -     HYDROcodone-acetaminophen (NORCO)  MG per " tablet; Take 1 tablet by mouth 5 (Five) Times a Day. 30 day supply  Dispense: 150 tablet; Refill: 0    2. Lumbar radiculitis  -     HYDROcodone-acetaminophen (NORCO)  MG per tablet; Take 1 tablet by mouth 5 (Five) Times a Day. 30 day supply  Dispense: 150 tablet; Refill: 0    3. Displacement of cervical intervertebral disc without myelopathy    4. Cervical radiculopathy    5. High risk medication use    6. Left foot pain  -     Ambulatory Referral to Podiatry    7. Muscle spasm  -     cyclobenzaprine (FLEXERIL) 10 MG tablet; Take 1 tablet by mouth 2 (Two) Times a Day As Needed for Muscle Spasms.  Dispense: 60 tablet; Refill: 0    --- Routine UDS in office today as part of monitoring requirements for controlled substances.  The specimen was viewed and the immunoassay result reviewed and is +OPI (appropriate).  This specimen will be sent to [facility] laboratory for confirmation.   --- Refill Flexeril   --- The patient signed an updated copy of the controlled substance agreement on 6/10/22.  --- Refill Hydrocodone. Patient appears stable with current regimen. No adverse effects. Regarding continuation of opioids, there is no evidence of aberrant behavior or any red flags.  The patient continues with appropriate response to opioid therapy. ADL's remain intact by self.   --- Follow-up for procedure - scheduled for 3/16/23     CARI REPORT  As part of the patient's treatment plan, I am prescribing controlled substances. The patient has been made aware of appropriate use of such medications, including potential risk of somnolence, limited ability to drive and/or work safely, and the potential for dependence or overdose. It has also been made clear that these medications are for use by this patient only, without concomitant use of alcohol or other substances unless prescribed.     Patient has completed prescribing agreement detailing terms of continued prescribing of controlled substances, including monitoring  CARI reports, urine drug screening, and pill counts if necessary. The patient is aware that inappropriate use will results in cessation of prescribing such medications.    As the clinician, I personally reviewed the CRAI from 2/21/23 while the patient was in the office today.    History and physical exam exhibit continued safe and appropriate use of controlled substances.    Dictated utilizing Dragon dictation.     Patient remained masked during entire encounter. No cough present. I donned a mask and eye protection throughout entire visit. Prior to donning mask and eye protection, hand hygiene was performed, as well as when it was doffed.  I was closer than 6 feet, but not for an extended period of time. No obvious exposure to any bodily fluids.

## 2023-03-16 ENCOUNTER — HOSPITAL ENCOUNTER (OUTPATIENT)
Dept: GENERAL RADIOLOGY | Facility: SURGERY CENTER | Age: 58
Setting detail: HOSPITAL OUTPATIENT SURGERY
End: 2023-03-16
Payer: COMMERCIAL

## 2023-03-16 ENCOUNTER — HOSPITAL ENCOUNTER (OUTPATIENT)
Facility: SURGERY CENTER | Age: 58
Setting detail: HOSPITAL OUTPATIENT SURGERY
Discharge: HOME | End: 2023-03-16
Attending: ANESTHESIOLOGY | Admitting: ANESTHESIOLOGY
Payer: COMMERCIAL

## 2023-03-16 VITALS
HEART RATE: 81 BPM | DIASTOLIC BLOOD PRESSURE: 84 MMHG | BODY MASS INDEX: 31.57 KG/M2 | WEIGHT: 220 LBS | OXYGEN SATURATION: 95 % | SYSTOLIC BLOOD PRESSURE: 142 MMHG | RESPIRATION RATE: 18 BRPM | TEMPERATURE: 97.3 F

## 2023-03-16 DIAGNOSIS — Z41.9 SURGERY, ELECTIVE: ICD-10-CM

## 2023-03-16 DIAGNOSIS — M47.816 LUMBAR FACET ARTHROPATHY: ICD-10-CM

## 2023-03-16 PROCEDURE — 25510000001 IOPAMIDOL 61 % SOLUTION 30 ML VIAL: Performed by: ANESTHESIOLOGY

## 2023-03-16 PROCEDURE — 77002 NEEDLE LOCALIZATION BY XRAY: CPT

## 2023-03-16 PROCEDURE — 76000 FLUOROSCOPY <1 HR PHYS/QHP: CPT

## 2023-03-16 PROCEDURE — 64494 INJ PARAVERT F JNT L/S 2 LEV: CPT | Performed by: ANESTHESIOLOGY

## 2023-03-16 PROCEDURE — 64493 INJ PARAVERT F JNT L/S 1 LEV: CPT | Performed by: ANESTHESIOLOGY

## 2023-03-16 RX ORDER — SODIUM CHLORIDE 0.9 % (FLUSH) 0.9 %
10 SYRINGE (ML) INJECTION EVERY 12 HOURS SCHEDULED
Status: DISCONTINUED | OUTPATIENT
Start: 2023-03-16 | End: 2023-03-16 | Stop reason: HOSPADM

## 2023-03-16 RX ORDER — SODIUM CHLORIDE 0.9 % (FLUSH) 0.9 %
10 SYRINGE (ML) INJECTION AS NEEDED
Status: DISCONTINUED | OUTPATIENT
Start: 2023-03-16 | End: 2023-03-16 | Stop reason: HOSPADM

## 2023-03-16 NOTE — OP NOTE
Bilateral L3-5 Lumbar Medial Branch Blockade  [facility]      PREOPERATIVE DIAGNOSIS:  Lumbar spondylosis without myelopathy    POSTOPERATIVE DIAGNOSIS:  Lumbar spondylosis without myelopathy    PROCEDURE:   Diagnostic Bilateral Lumbar Medial Branch Nerve Blockades, with fluoroscopy:  L3, L4, and L5 nerves (at the L4 & L5 transverse processes and the sacral alar groove) to block facet joints L4-5, and L5-S1  1.  Bilateral Lumbar Facet blocks, 1st Level  2.  Bilateral Lumbar Facet blocks, 2nd  Level    PRE-PROCEDURE DISCUSSION WITH PATIENT:    Risks and complications were discussed with the patient prior to starting the procedure and informed consent was obtained.      SURGEON:  Cruz Aguayo MD    REASON FOR PROCEDURE:    The patient complains of pain that seems to have a significant axial component and Previous diagnostic positivity of a Lumbar Medial Branch Blockade at the same levels    SEDATION:  Patient declined administration of moderate sedation    ANESTHETIC:  Marcaine 0.5%  STEROID:  NONE  TOTAL VOLUME OF SOLUTION:  6ml    DESCRIPTON OF PROCEDURE:  After obtaining informed consent, IV access was not obtained in the preoperative area.   The patient was taken to the operating room.  The patient was placed in the prone position with a pillow under the abdomen. All pressure points were well padded.  EKG, blood pressure, and pulse oximeter were monitored.  The patient was monitored and sedated by the RN under my direction. The lumbosacral area was prepped with Chloraprep and draped in a sterile fashion. Under fluoroscopic guidance the transverse processes of the L4 and L5 vertebrae at the junctions of the superior articular processes were identified on the right. Also identified was the groove between the ala and the superior articular process of the sacrum on the ipsilateral side.  Skin and subcutaneous tissue were anesthetized with 1% lidocaine above each of these points. A 22-gauge  spinal needle was introduced under fluoroscopic guidance at the above junctions. Aspiration was negative for blood and CSF.  After confirming the position of the needle with fluoroscope in all views, 0.25 mL of Omnipaque was injected, and after seeing the proper spread a total of 1 mL of the anesthetic solution noted above was injected at each of these points.  Needles were removed intact from each of the areas.  A similar procedure was repeated to block the L3, L4, and L5 nerves on the contralateral side.   Onset of analgesia was noted.  Vital signs remained stable throughout.      ESTIMATED BLOOD LOSS:  <5 mL  SPECIMENS:  none    COMPLICATIONS:   No complications were noted., There was no indication of vascular uptake on live injection of contrast dye., There was no indication of intrathecal uptake on live injection of contrast dye., There was not any evidence of dural puncture.   and The patient did not have any signs of postprocedure numbness nor weakness.    TOLERANCE & DISCHARGE CONDITION:    The patient tolerated the procedure well.  The patient was transported to the recovery area without difficulties.  The patient was discharged to home under the care of family in stable and satisfactory condition.    PLAN OF CARE:  1. The patient was given our standard instruction sheet.  2. We discussed that Lumbar Medial Branch Blockade is a diagnostic procedure in consideration for radiofrequency ablation if two diagnostic procedures prove to be positive for significant benefit.  If sustained relief of 6 to eight weeks is obtained, then an alternative plan could be therapeutic lumbar branch blockades.  3. The patient is asked to keep a pain log each hour for 8 hours after the procedure today.  4. The patient will  Return to clinic 1 wk.  5. The patient will resume all medications as per the medication reconciliation sheet.

## 2023-03-16 NOTE — DISCHARGE INSTRUCTIONS
[facility] - Post-procedure Instructions          --  While there are no absolute restrictions, it is recommended that you do not perform strenuous activity today. In the morning, you may resume your level of activity as before your block.    --  If you have a band-aid at your injection site, please remove it later today. Observe the area for any redness, swelling, pus-like drainage, or a temperature over 101°. If any of these symptoms occur, please call your doctor at [phone]. If after office hours, leave a message and the on-call provider will return your call.    --  Ice may be applied to your injection site. It is recommended you avoid direct heat (heating pad; hot tub) for 1-2 days.    --  Call [facility] at [phone] if you experience persistent headache, persistent bleeding from the injection site, or severe pain not relieved by heat or oral medication.    --  Do not make important decisions today.    --  Due to the effects of the block and/or the I.V. Sedation, DO NOT drive or operate hazardous machinery for 12 hours.  Local anesthetics may cause numbness after procedure and precautions must be taken with regards to operating equipment as well as with walking, even if ambulating with assistance of another person or with an assistive device.    --  Do not drink alcohol for 12 hours.    -- You may return to work tomorrow, or as directed by your referring doctor.    --  Occasionally you may notice a slight increase in your pain after the procedure. This should start to improve within the next 24-48 hours. Radiofrequency ablation procedure pain may last 3-4 weeks.    --  It may take as long as 3-4 days before you notice a gradual improvement in your pain and/or other symptoms.    -- You may continue to take your prescribed pain medication as needed.    --  Some normal possible side effects of steroid use could include fluid retention, increased blood sugar, dull headache,  increased sweating, increased appetite, mood swings and flushing.    --  Diabetics are recommended to watch their blood glucose level closely for 24-48 hours after the injection.    --  Must stay in PACU for 20 min upon arrival and prove no leg weakness before being discharged.    --  IN THE EVENT OF A LIFE THREATENING EMERGENCY, (CHEST PAIN, BREATHING DIFFICULTIES, PARALYSIS…) YOU SHOULD GO TO YOUR NEAREST EMERGENCY ROOM.    --  You should be contacted by our office within 2-3 days to schedule follow up or next appointment date.  If not contacted within 7 days, please call the office at [phone]

## 2023-03-23 ENCOUNTER — OFFICE VISIT (OUTPATIENT)
Dept: PAIN MEDICINE | Facility: CLINIC | Age: 58
End: 2023-03-23
Payer: COMMERCIAL

## 2023-03-23 ENCOUNTER — PREP FOR SURGERY (OUTPATIENT)
Dept: SURGERY | Facility: SURGERY CENTER | Age: 58
End: 2023-03-23
Payer: COMMERCIAL

## 2023-03-23 VITALS
DIASTOLIC BLOOD PRESSURE: 70 MMHG | TEMPERATURE: 98.4 F | HEART RATE: 93 BPM | WEIGHT: 213.8 LBS | HEIGHT: 70 IN | BODY MASS INDEX: 30.61 KG/M2 | OXYGEN SATURATION: 94 % | SYSTOLIC BLOOD PRESSURE: 116 MMHG

## 2023-03-23 DIAGNOSIS — Z79.899 HIGH RISK MEDICATION USE: ICD-10-CM

## 2023-03-23 DIAGNOSIS — M47.816 LUMBAR FACET ARTHROPATHY: Primary | ICD-10-CM

## 2023-03-23 DIAGNOSIS — M54.12 CERVICAL RADICULOPATHY: ICD-10-CM

## 2023-03-23 DIAGNOSIS — M51.36 DDD (DEGENERATIVE DISC DISEASE), LUMBAR: Primary | ICD-10-CM

## 2023-03-23 DIAGNOSIS — M54.16 LUMBAR RADICULITIS: ICD-10-CM

## 2023-03-23 DIAGNOSIS — M62.838 MUSCLE SPASM: ICD-10-CM

## 2023-03-23 DIAGNOSIS — M50.20 DISPLACEMENT OF CERVICAL INTERVERTEBRAL DISC WITHOUT MYELOPATHY: ICD-10-CM

## 2023-03-23 PROCEDURE — 99214 OFFICE O/P EST MOD 30 MIN: CPT

## 2023-03-23 RX ORDER — SODIUM CHLORIDE 0.9 % (FLUSH) 0.9 %
10 SYRINGE (ML) INJECTION AS NEEDED
OUTPATIENT
Start: 2023-03-23

## 2023-03-23 RX ORDER — CYCLOBENZAPRINE HCL 10 MG
10 TABLET ORAL 2 TIMES DAILY PRN
Qty: 60 TABLET | Refills: 0 | Status: SHIPPED | OUTPATIENT
Start: 2023-03-23

## 2023-03-23 RX ORDER — SODIUM CHLORIDE 0.9 % (FLUSH) 0.9 %
10 SYRINGE (ML) INJECTION EVERY 12 HOURS SCHEDULED
OUTPATIENT
Start: 2023-03-23

## 2023-03-23 RX ORDER — HYDROCODONE BITARTRATE AND ACETAMINOPHEN 10; 325 MG/1; MG/1
1 TABLET ORAL
Qty: 150 TABLET | Refills: 0 | Status: SHIPPED | OUTPATIENT
Start: 2023-03-23
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
## 2023-03-30 ENCOUNTER — TRANSCRIBE ORDERS (OUTPATIENT)
Dept: SURGERY | Facility: SURGERY CENTER | Age: 58
End: 2023-03-30
Payer: COMMERCIAL

## 2023-03-30 DIAGNOSIS — Z41.9 SURGERY, ELECTIVE: Primary | ICD-10-CM

## 2023-04-18 DIAGNOSIS — M62.838 MUSCLE SPASM: ICD-10-CM

## 2023-04-18 RX ORDER — CYCLOBENZAPRINE HCL 10 MG
TABLET ORAL
Qty: 60 TABLET | Refills: 0 | Status: SHIPPED | OUTPATIENT
Start: 2023-04-18

## 2023-04-19 ENCOUNTER — TELEPHONE (OUTPATIENT)
Dept: PAIN MEDICINE | Facility: CLINIC | Age: 58
End: 2023-04-19
Payer: COMMERCIAL

## 2023-04-19 DIAGNOSIS — M51.36 DDD (DEGENERATIVE DISC DISEASE), LUMBAR: ICD-10-CM

## 2023-04-19 DIAGNOSIS — M54.16 LUMBAR RADICULITIS: ICD-10-CM

## 2023-04-19 NOTE — TELEPHONE ENCOUNTER
"Caller: Colby Alvarado \"NONA\"    Relationship: Self    Best call back number:     Requested Prescriptions:   Requested Prescriptions     Pending Prescriptions Disp Refills   • HYDROcodone-acetaminophen (NORCO)  MG per tablet 150 tablet 0     Sig: Take 1 tablet by mouth 5 (Five) Times a Day. 30 day supply        Pharmacy where request should be sent: [facility]/PHARMACY #[id number] - [facility] [address] [phone] [facility] [phone] [facility]     Last office visit with prescribing clinician: 3/23/2023   Last telemedicine visit with prescribing clinician: 5/23/2023   Next office visit with prescribing clinician: 5/23/2023     Does the patient have less than a 3 day supply:  [x] Yes  [] No    Would you like a call back once the refill request has been completed: [x] Yes [] No    If the office needs to give you a call back, can they leave a voicemail: [x] Yes [] No    aJk H[employer]ron Rep   04/19/23 10:01 EDT           "

## 2023-04-20 RX ORDER — HYDROCODONE BITARTRATE AND ACETAMINOPHEN 10; 325 MG/1; MG/1
1 TABLET ORAL
Qty: 150 TABLET | Refills: 0 | Status: SHIPPED | OUTPATIENT
Start: 2023-04-20

## 2023-05-11 ENCOUNTER — HOSPITAL ENCOUNTER (OUTPATIENT)
Facility: SURGERY CENTER | Age: 58
Setting detail: HOSPITAL OUTPATIENT SURGERY
Discharge: HOME | End: 2023-05-11
Attending: ANESTHESIOLOGY | Admitting: ANESTHESIOLOGY
Payer: COMMERCIAL

## 2023-05-11 ENCOUNTER — HOSPITAL ENCOUNTER (OUTPATIENT)
Dept: GENERAL RADIOLOGY | Facility: SURGERY CENTER | Age: 58
Setting detail: HOSPITAL OUTPATIENT SURGERY
End: 2023-05-11
Payer: COMMERCIAL

## 2023-05-11 VITALS
WEIGHT: 215 LBS | DIASTOLIC BLOOD PRESSURE: 76 MMHG | HEIGHT: 70 IN | RESPIRATION RATE: 16 BRPM | HEART RATE: 81 BPM | TEMPERATURE: 98 F | OXYGEN SATURATION: 95 % | BODY MASS INDEX: 30.78 KG/M2 | SYSTOLIC BLOOD PRESSURE: 141 MMHG

## 2023-05-11 DIAGNOSIS — Z41.9 SURGERY, ELECTIVE: ICD-10-CM

## 2023-05-11 DIAGNOSIS — M47.816 LUMBAR FACET ARTHROPATHY: ICD-10-CM

## 2023-05-11 PROCEDURE — 64635 DESTROY LUMB/SAC FACET JNT: CPT | Performed by: ANESTHESIOLOGY

## 2023-05-11 PROCEDURE — 64636 DESTROY L/S FACET JNT ADDL: CPT | Performed by: ANESTHESIOLOGY

## 2023-05-11 PROCEDURE — S0260 H&P FOR SURGERY: HCPCS | Performed by: ANESTHESIOLOGY

## 2023-05-11 PROCEDURE — 76000 FLUOROSCOPY <1 HR PHYS/QHP: CPT

## 2023-05-11 PROCEDURE — 0 LIDOCAINE 1 % SOLUTION: Performed by: ANESTHESIOLOGY

## 2023-05-11 RX ORDER — SODIUM CHLORIDE 0.9 % (FLUSH) 0.9 %
10 SYRINGE (ML) INJECTION EVERY 12 HOURS SCHEDULED
Status: DISCONTINUED | OUTPATIENT
Start: 2023-05-11 | End: 2023-05-11 | Stop reason: HOSPADM

## 2023-05-11 RX ORDER — SODIUM CHLORIDE 0.9 % (FLUSH) 0.9 %
10 SYRINGE (ML) INJECTION AS NEEDED
Status: DISCONTINUED | OUTPATIENT
Start: 2023-05-11 | End: 2023-05-11 | Stop reason: HOSPADM

## 2023-05-11 RX ORDER — LIDOCAINE HYDROCHLORIDE 10 MG/ML
INJECTION, SOLUTION INFILTRATION; PERINEURAL AS NEEDED
Status: DISCONTINUED | OUTPATIENT
Start: 2023-05-11 | End: 2023-05-11 | Stop reason: HOSPADM

## 2023-05-11 NOTE — H&P
Brief Pre-procedural / Pre-operative H&P        -----    Pertinent Diagnosis:   Lumbar spondylosis.  Lumbar facet arthropathy    Proposed Procedure: Lumbar medial branch radiofrequency ablation      Subjective   Colby Alvarado is a 57 y.o. male  who presents for intervention.  He has a history of back pain.      History of Present Illness     Back pain with axial elements and diagnostic 80% relief from medial branch blockades x2.  Aching and burning mainly nonradiating low back pain that increases with standing and bending and twisting and walking for long distances.  My partner had noted pain with movement and decreased range of motion.The patient has multilevel degenerative changes in the spine.  -------    The following portions of the patient's history were reviewed and updated as appropriate: allergies, current medications, past family history, past medical history, past social history, past surgical history and problem list.    No Known Allergies      Current Facility-Administered Medications:   •  sodium chloride 0.9 % flush 10 mL, 10 mL, Intravenous, Q12H, Cruz Aguayo MD  •  sodium chloride 0.9 % flush 10 mL, 10 mL, Intravenous, PRN, Cruz Aguayo MD    No current facility-administered medications on file prior to encounter.     Current Outpatient Medications on File Prior to Encounter   Medication Sig Dispense Refill   • atorvastatin (LIPITOR) 80 MG tablet Take 1 tablet by mouth Every Night.     • cetirizine (zyrTEC) 10 MG tablet Take 1 tablet by mouth Daily.     • ezetimibe (ZETIA) 10 MG tablet Take 1 tablet by mouth every night at bedtime.     • lisinopril (PRINIVIL,ZESTRIL) 10 MG tablet Take 1 tablet by mouth 2 (two) times a day. 60 tablet 6   • metoprolol succinate XL (TOPROL-XL) 100 MG 24 hr tablet TAKE 1 TABLET BY MOUTH  DAILY MAY TAKE AN  ADDITIONAL 1/2 TABLET DAILY AS NEEDED 135 tablet 3   • SUMAtriptan Succinate (IMITREX) 6 MG/0.5ML injection sumatriptan 6 mg/0.5 mL subcutaneous pen  injector     • zolpidem (AMBIEN) 10 MG tablet Take 1 tablet by mouth At Night As Needed for Sleep.     • Galcanezumab-gnlm (Emgality, 300 MG Dose,) 100 MG/ML solution prefilled syringe As Needed.     • Molnupiravir (Lagevrio) 200 MG capsule Lagevrio 200 mg capsule (EUA)   Take 4 capsules every 12 hours by oral route for 5 days.         Patient Active Problem List   Diagnosis   • Complete tear of right rotator cuff   • PAF (paroxysmal atrial fibrillation)   • Essential hypertension   • PRYOR (dyspnea on exertion)   • Palpitations   • Precordial pain   • DDD (degenerative disc disease), cervical   • Cervical radiculopathy   • Lumbar radiculitis   • DDD (degenerative disc disease), lumbar   • Lumbar facet arthropathy       Past Medical History:   Diagnosis Date   • Anxiety    • Arthritis of back    • Atrial fibrillation    • Chronic pain    • Depression    • H/O cluster headache    • Hearing loss    • Hyperlipidemia    • Hypertension    • Insomnia    • Periarthritis of shoulder    • Seasonal allergies    • Sleep apnea     using CPAP       Past Surgical History:   Procedure Laterality Date   • BACK SURGERY     • CARDIAC ABLATION  07/31/2019   • CERVICAL EPIDURAL Bilateral 4/5/2022    Procedure: CERVICAL EPIDURAL;  Surgeon: Cruz Aguayo MD;  Location: [facility] [place] MAIN OR;  Service: Pain Management;  Laterality: Bilateral;   • CERVICAL EPIDURAL N/A 5/19/2022    Procedure: CERVICAL EPIDURAL;  Surgeon: Cruz Aguayo MD;  Location: [facility] [place] MAIN OR;  Service: Pain Management;  Laterality: N/A;   • EPIDURAL Bilateral 8/23/2022    Procedure: L4 TRANSFORAMINAL LUMBAR EPIDURAL STEROID INJECTION;  Surgeon: Cruz Aguayo MD;  Location: [facility] [place] MAIN OR;  Service: Pain Management;  Laterality: Bilateral;   • EYE MUSCLE SURGERY Left    • HAND SURGERY Right     open fracture plate   • HARDWARE REMOVAL Right     rt hand  plate   • HERNIA REPAIR     • MEDIAL BRANCH BLOCK Bilateral 11/10/2022    Procedure: MEDIAL BRANCH BLOCK LUMBAR  bilateral L3-L5;  Surgeon: Cruz Aguayo MD;  [place] [facility] [place];  Service: Pain Management;  Laterality: Bilateral;   • MEDIAL BRANCH BLOCK Bilateral 3/16/2023    Procedure: BILATEAL L3-L5 LUMBAR MEDIAL BRANCH BLOCK;  Surgeon: Cruz Aguayo MD;  [place] [facility] [place];  Service: Pain Management;  Laterality: Bilateral;   • NASAL FRACTURE SURGERY     • SHOULDER ARTHROSCOPY W/ ROTATOR CUFF REPAIR Right 3/13/2019    Procedure: SHOULDER ARTHROSCOPY, DEBRIDEIMENT OF LABRUM AND SUBSCAP, DECEOMPRESSION, DISTAL CLAVICLE EXCISION,  WITH MINI OPEN ROTATOR CUFF REPAIR;  Surgeon: Estela Townsend MD;  Location: [facility];  Service: Orthopedics   • SHOULDER SURGERY     • SPINE SURGERY     • VASECTOMY         Family History   Problem Relation Age of Onset   • Hypertension Other    • Heart disease Other    • Lung disease Other    • Emphysema Mother    • Hypertension Mother    • Heart disease Mother    • Cancer Mother         Oral   • Hypertension Sister    • No Known Problems Brother    • Heart attack Maternal Grandmother    • Emphysema Maternal Grandmother    • Heart disease Maternal Grandmother    • Stroke Maternal Grandmother    • Heart attack Maternal Grandfather    • Emphysema Maternal Grandfather    • Malig Hyperthermia Neg Hx        Social History     Socioeconomic History   • Marital status: Significant Other   Tobacco Use   • Smoking status: Former     Packs/day: 1.50     Years: 30.00     Pack years: 45.00     Types: Cigarettes     Quit date: 2021     Years since quittin.1   • Smokeless tobacco: Former     Types: Snuff   Vaping Use   • Vaping Use: Some days   • Substances: Nicotine   • Devices: Pre-filled or refillable cartridge   Substance and Sexual Activity   • Alcohol use: Yes     Alcohol/week: 16.0 standard drinks     Types: 16 Cans of beer per week     Comment: occ   • Drug use: No   • Sexual activity: Defer       -------       Review of Systems  No Fever, No Chills, No ear pain, No  "sinus pressure or drainage, No eye pain or drainage, No cough, No SOB, No chest tightness nor chest pain, no palpitations.          Vitals:    05/11/23 0807   BP: 130/79   BP Location: Left arm   Patient Position: Lying   Pulse: 85   Resp: 17   Temp: 98 °F (36.7 °C)   TempSrc: Temporal   SpO2: 94%   Weight: 97.5 kg (215 lb)   Height: 177.8 cm (70\")         Objective   Physical Exam  VSS, NNR, NCAT, NMNA, NRD, AAOx3.      -------    Assessment & Plan:  - as noted above, the stated intervention is indicated  - Follow-up plan will be noted in the operative report        Has follow up 5-      EMR Dragon/Transcription disclaimer:   Typed items in this encounter note may have been created by electronic transcription/translation software which converts spoken language to printed text. The electronic translation of spoken language may permit erroneous, or at times, nonsensical words or phrases to be inadvertently transcribed; Although I have reviewed the note for such errors, some may still exist.      "

## 2023-05-11 NOTE — OP NOTE
Bilateral L3-5 Lumbar Medial Branch RADIOFREQUENCY  [facility]      PREOPERATIVE DIAGNOSIS:  Lumbar spondylosis without myelopathy    POSTOPERATIVE DIAGNOSIS:  Lumbar spondylosis without myelopathy    PROCEDURE:   Diagnostic Bilateral Lumbar Medial Branch Nerve thermal radiofrequency lesioning, with fluoroscopy:  L3, L4, and L5 nerves (at the L4 and L5 transverse processes and the sacral alar groove) to thermally treat the innervation to facet joints L4-5 and L5-S1  1. 32704-51 -- Bilateral L/S facet neuro destr., 1st Level  2. 72089-72 -- Bilateral L/S facet neuro destr., 2nd  Level    PRE-PROCEDURE DISCUSSION WITH PATIENT:    Risks and complications were discussed with the patient prior to starting the procedure and informed consent was obtained.      SURGEON:  Cruz Aguayo MD    REASON FOR PROCEDURE:    The patient complains of pain that seems to have a significant axial component and Previous diagnostic positivity of two Lumbar Medial Branch Blockades at the same levels    SEDATION:  Patient declined administration of moderate sedation        ANESTHETIC:  Lidocaine 2%  STEROID:  NONE      DESCRIPTON OF PROCEDURE:  After obtaining informed consent, IV access  was not obtained in the preoperative area.   The patient was taken to the operating room.  The patient was placed in the prone position with a pillow under the abdomen. All pressure points were well padded.  EKG, blood pressure, and pulse oximeter were monitored.  The patient was monitored and sedated by the RN under my direction. The lumbosacral area was prepped with Chloraprep and draped in a sterile fashion.     Under fluoroscopic guidance the transverse processes of the L4 and L5 vertebrae at the junctions of the superior articular processes were identified on the right.  Also identified was the groove between the ala and the superior articular process of the sacrum on the ipsilateral side.  Skin and subcutaneous tissue were  anesthetized with 1ml of 1% lidocaine above each of these points. Then, radiofrequency probe needles were advanced in this fluoro view to the above junctions.  Aspiration was negative for blood and CSF.  After confirming the position of the needle with fluoroscope in all views, testing was initiated.  First, sensory testing was started on each needle a 1V and 50Hz and slowly decreased until painful pressure stimulation diminished at 0.5V.  Next, motor testing was confirmed to be negative at 3V and 2Hz for any radicular stimulation.  Then 1mL of the local anesthetic was instilled in each needle.  Two minutes elapsed, and during this time a lateral fluoroscopic view was confirmed again to ensure the needles had not advanced nor retracted.  Then, Radiofrequency Lesioning was initiated for 3 minutes at 80 degrees Celsius.  Needles were removed intact from each of the areas.     A similar procedure was repeated to address the L3, L4, and L5 nerves on the contralateral side.   Onset of analgesia was noted.  Vital signs remained stable throughout.      ESTIMATED BLOOD LOSS:  <5 mL  SPECIMENS:  none    COMPLICATIONS:   No complications were noted. and The patient did not have any signs of postprocedure numbness nor weakness.    TOLERANCE & DISCHARGE CONDITION:    The patient tolerated the procedure well.  The patient was transported to the recovery area without difficulties.  The patient was discharged to home under the care of family in stable and satisfactory condition.    PLAN OF CARE:  1. The patient was given our standard instruction sheet.  2. The patient will  Return to clinic 2-3 wks.  3. The patient will resume all medications as per the medication reconciliation sheet.

## 2023-05-11 NOTE — DISCHARGE INSTRUCTIONS
[facility] - Post-procedure Instructions          --  While there are no absolute restrictions, it is recommended that you do not perform strenuous activity today. In the morning, you may resume your level of activity as before your block.    --  If you have a band-aid at your injection site, please remove it later today. Observe the area for any redness, swelling, pus-like drainage, or a temperature over 101°. If any of these symptoms occur, please call your doctor at [phone]. If after office hours, leave a message and the on-call provider will return your call.    --  Ice may be applied to your injection site. It is recommended you avoid direct heat (heating pad; hot tub) for 1-2 days.    --  Call [facility] at [phone] if you experience persistent headache, persistent bleeding from the injection site, or severe pain not relieved by heat or oral medication.    --  Do not make important decisions today.    --  Due to the effects of the block and/or the I.V. Sedation, DO NOT drive or operate hazardous machinery for 12 hours.  Local anesthetics may cause numbness after procedure and precautions must be taken with regards to operating equipment as well as with walking, even if ambulating with assistance of another person or with an assistive device.    --  Do not drink alcohol for 12 hours.    -- You may return to work tomorrow, or as directed by your referring doctor.    --  Occasionally you may notice a slight increase in your pain after the procedure. This should start to improve within the next 24-48 hours. Radiofrequency ablation procedure pain may last 3-4 weeks.    --  It may take as long as 3-4 days before you notice a gradual improvement in your pain and/or other symptoms.    -- You may continue to take your prescribed pain medication as needed.    --  Some normal possible side effects of steroid use could include fluid retention, increased blood sugar, dull headache,  increased sweating, increased appetite, mood swings and flushing.    --  Diabetics are recommended to watch their blood glucose level closely for 24-48 hours after the injection.    --  Must stay in PACU for 20 min upon arrival and prove no leg weakness before being discharged.    --  IN THE EVENT OF A LIFE THREATENING EMERGENCY, (CHEST PAIN, BREATHING DIFFICULTIES, PARALYSIS…) YOU SHOULD GO TO YOUR NEAREST EMERGENCY ROOM.    --  You should be contacted by our office within 2-3 days to schedule follow up or next appointment date.  If not contacted within 7 days, please call the office at [phone]

## 2023-05-15 DIAGNOSIS — M62.838 MUSCLE SPASM: ICD-10-CM

## 2023-05-16 RX ORDER — CYCLOBENZAPRINE HCL 10 MG
TABLET ORAL
Qty: 60 TABLET | Refills: 0 | Status: SHIPPED | OUTPATIENT
Start: 2023-05-16

## 2023-05-17 DIAGNOSIS — M51.36 DDD (DEGENERATIVE DISC DISEASE), LUMBAR: ICD-10-CM

## 2023-05-17 DIAGNOSIS — M54.16 LUMBAR RADICULITIS: ICD-10-CM

## 2023-05-17 NOTE — TELEPHONE ENCOUNTER
Caller: LYUDMILA     Relationship: SELF    Best call back number: [phone]    Requested Prescriptions:   Requested Prescriptions     Pending Prescriptions Disp Refills   • HYDROcodone-acetaminophen (NORCO)  MG per tablet 150 tablet 0     Sig: Take 1 tablet by mouth 5 (Five) Times a Day. 30 day supply        Pharmacy where request should be sent: [facility] #[id number] - [facility] [address]  [phone]  - [phone] FX     Last office visit with prescribing clinician: 3/23/2023   Last telemedicine visit with prescribing clinician: 5/15/2023   Next office visit with prescribing clinician: 5/23/2023       Does the patient have less than a 3 day supply:  [] Yes  [x] No        Jak Will Rep   05/17/23 08:23 EDT

## 2023-05-18 RX ORDER — HYDROCODONE BITARTRATE AND ACETAMINOPHEN 10; 325 MG/1; MG/1
1 TABLET ORAL
Qty: 150 TABLET | Refills: 0 | Status: SHIPPED | OUTPATIENT
Start: 2023-05-18

## 2023-05-25 ENCOUNTER — OFFICE VISIT (OUTPATIENT)
Dept: PAIN MEDICINE | Facility: CLINIC | Age: 58
End: 2023-05-25
Payer: COMMERCIAL

## 2023-05-25 VITALS
TEMPERATURE: 96.9 F | BODY MASS INDEX: 30.32 KG/M2 | SYSTOLIC BLOOD PRESSURE: 150 MMHG | WEIGHT: 211.8 LBS | HEIGHT: 70 IN | RESPIRATION RATE: 20 BRPM | DIASTOLIC BLOOD PRESSURE: 83 MMHG | HEART RATE: 91 BPM | OXYGEN SATURATION: 96 %

## 2023-05-25 DIAGNOSIS — M62.838 MUSCLE SPASM: ICD-10-CM

## 2023-05-25 DIAGNOSIS — Z79.899 HIGH RISK MEDICATION USE: ICD-10-CM

## 2023-05-25 DIAGNOSIS — M51.36 DDD (DEGENERATIVE DISC DISEASE), LUMBAR: Primary | ICD-10-CM

## 2023-05-25 DIAGNOSIS — M50.20 DISPLACEMENT OF CERVICAL INTERVERTEBRAL DISC WITHOUT MYELOPATHY: ICD-10-CM

## 2023-05-25 DIAGNOSIS — M54.16 LUMBAR RADICULITIS: ICD-10-CM

## 2023-05-25 DIAGNOSIS — M54.12 CERVICAL RADICULOPATHY: ICD-10-CM

## 2023-05-25 NOTE — PROGRESS NOTES
CHIEF COMPLAINT  Back and neck pain     Subjective   Colby Alvarado is a 57 y.o. male patient presents for follow-up of PROCEDURE. Patient had a Bilateral L3-L5 RFA performed by Dr. Aguayo on 5/11/23 for management of low back pain. Patient reports 50% ongoing relief from the procedure. He reports that he has been able to rest better at night since the procedure.     Today pain is 6/10VAS in severity. Pain is located in his neck and lower back. Back pain is greater than neck pain. Describes this pain as an intermittent aching and burning. Pain is worsened by certain movements, prolonged positions (standing), bending/twisting, and walking long distances. Pain improves with rest, reposition, and medication.      Continues with Hydrocodone 10mg 5/day. He also takes Flexeril PRN. Denies any side effects from the regimen, including constipation and somnolence. The regimen helps decrease pain by a moderate amount. He works as a  and this helps gets through the day. ADL's by self. Denies any bowel or bladder changes.      Procedures:  511/23 - Bilateral L3-L5 RFA - 50% ongoing relief  3/16/23 - Bilateral L3-L5 MBB - 80% relief x 24 hours  11/10/22 - bilateral L3-L5 MBB - 80% relief x a few hours  8/23/22 - bilateral L4 LTFESI - 90% relief of radicular symptoms - no relief of back pain  5/19/22 - SHAY - 50% ongoing relief  4/5/22 - SHAY - 80% relief x a couple of weeks     He has a history of lumbar spine surgery 15 years ago with Dr. Moon.     Neck Pain   This is a chronic problem. The current episode started more than 1 year ago. The problem occurs daily. The problem has been unchanged (unchanged since last office visit). The pain is present in the left side, midline and right side. The quality of the pain is described as aching and burning. The pain is at a severity of 5/10. The symptoms are aggravated by position and twisting. Pertinent negatives include no chest pain, fever, headaches, numbness  or weakness. He has tried NSAIDs, acetaminophen, neck support, muscle relaxants and oral narcotics for the symptoms. The treatment provided mild relief.   Back Pain  The current episode started more than 1 month ago. The problem occurs daily. The problem has been waxing and waning since onset. The pain is present in the lumbar spine. The quality of the pain is described as aching and burning. The pain radiates to the left thigh and right thigh (bilateral hips, lateral legs). The pain is at a severity of 6/10. The symptoms are aggravated by lying down, standing and sitting (work, prolonged position, lifting). Pertinent negatives include no abdominal pain, chest pain, dysuria, fever, headaches, numbness or weakness. He has tried NSAIDs and analgesics for the symptoms. The treatment provided mild relief.      PEG Assessment   What number best describes your pain on average in the past week?7  What number best describes how, during the past week, pain has interfered with your enjoyment of life?5  What number best describes how, during the past week, pain has interfered with your general activity?  7    The following portions of the patient's history were reviewed and updated as appropriate: allergies, current medications, past family history, past medical history, past social history, past surgical history and problem list.    Review of Systems   Constitutional: Negative for activity change, fatigue and fever.   HENT: Negative for congestion.    Eyes: Negative for visual disturbance.   Respiratory: Negative for cough and chest tightness.    Cardiovascular: Negative for chest pain.   Gastrointestinal: Negative for abdominal pain, constipation and diarrhea.   Genitourinary: Negative for difficulty urinating and dysuria.   Musculoskeletal: Positive for back pain and neck pain.   Neurological: Negative for dizziness, weakness, light-headedness, numbness and headaches.   Psychiatric/Behavioral: Negative for agitation, sleep  "disturbance and suicidal ideas. The patient is not nervous/anxious.      I have reviewed and confirmed the accuracy of the ROS as documented by the MA/LPN/RN LUIS EDUARDO Leslie    Vitals:    05/25/23 0803   BP: 150/83   BP Location: Left arm   Patient Position: Sitting   Cuff Size: Adult   Pulse: 91   Resp: 20   Temp: 96.9 °F (36.1 °C)   TempSrc: Temporal   SpO2: 96%   Weight: 96.1 kg (211 lb 12.8 oz)   Height: 177.8 cm (70\")   PainSc:   6     Objective   Physical Exam  Constitutional:       Appearance: Normal appearance.   HENT:      Head: Normocephalic.   Cardiovascular:      Rate and Rhythm: Normal rate and regular rhythm.   Pulmonary:      Effort: Pulmonary effort is normal.      Breath sounds: Normal breath sounds.   Musculoskeletal:      Cervical back: Normal range of motion. Tenderness and bony tenderness present. Pain with movement present.      Lumbar back: Tenderness (slight tenderness noted) and bony tenderness present. Decreased range of motion. Positive right straight leg raise test and positive left straight leg raise test.   Skin:     General: Skin is warm and dry.      Capillary Refill: Capillary refill takes less than 2 seconds.   Neurological:      General: No focal deficit present.      Mental Status: He is alert and oriented to person, place, and time.   Psychiatric:         Mood and Affect: Mood normal.         Speech: Speech normal.         Behavior: Behavior normal.         Thought Content: Thought content normal.         Cognition and Memory: Cognition normal.       Assessment & Plan   Diagnoses and all orders for this visit:    1. DDD (degenerative disc disease), lumbar (Primary)    2. Lumbar radiculitis    3. Muscle spasm    4. Displacement of cervical intervertebral disc without myelopathy    5. Cervical radiculopathy    6. High risk medication use    --- Routine UDS in office today as part of monitoring requirements for controlled substances.  The specimen was viewed and the " immunoassay result reviewed and is +OPI.  This specimen will be sent to [facility] laboratory for confirmation.     --- The patient signed an updated copy of the controlled substance agreement on 5/25/23  --- Continue Hydrocodone. No refill needed at this time. Patient appears stable with current regimen. No adverse effects. Regarding continuation of opioids, there is no evidence of aberrant behavior or any red flags.  The patient continues with appropriate response to opioid therapy. ADL's remain intact by self.   --- Follow-up 2 months or sooner if needed     CARI REPORT  As part of the patient's treatment plan, I am prescribing controlled substances. The patient has been made aware of appropriate use of such medications, including potential risk of somnolence, limited ability to drive and/or work safely, and the potential for dependence or overdose. It has also been made clear that these medications are for use by this patient only, without concomitant use of alcohol or other substances unless prescribed.     Patient has completed prescribing agreement detailing terms of continued prescribing of controlled substances, including monitoring CARI reports, urine drug screening, and pill counts if necessary. The patient is aware that inappropriate use will results in cessation of prescribing such medications.    As the clinician, I personally reviewed the CARI from 5/25/23 while the patient was in the office today.    History and physical exam exhibit continued safe and appropriate use of controlled substances.    Dictated utilizing Dragon dictation.

## 2023-06-13 RX ORDER — METOPROLOL SUCCINATE 100 MG/1
100 TABLET, EXTENDED RELEASE ORAL DAILY
Qty: 135 TABLET | Refills: 0 | Status: SHIPPED | OUTPATIENT
Start: 2023-06-13

## 2023-06-13 NOTE — TELEPHONE ENCOUNTER
Sent in 90 day supply. He has not been seen since February 2022, and was to follow up in 6 months with Dr. Cedillo at that time. He will need an appointment prior to additional refills.

## 2023-06-16 DIAGNOSIS — M62.838 MUSCLE SPASM: ICD-10-CM

## 2023-06-16 RX ORDER — CYCLOBENZAPRINE HCL 10 MG
TABLET ORAL
Qty: 60 TABLET | Refills: 0 | Status: SHIPPED | OUTPATIENT
Start: 2023-06-16

## 2023-07-25 ENCOUNTER — OFFICE VISIT (OUTPATIENT)
Dept: PAIN MEDICINE | Facility: CLINIC | Age: 58
End: 2023-07-25
Payer: COMMERCIAL

## 2023-07-25 VITALS
OXYGEN SATURATION: 96 % | DIASTOLIC BLOOD PRESSURE: 72 MMHG | HEART RATE: 90 BPM | WEIGHT: 209.6 LBS | TEMPERATURE: 97.1 F | SYSTOLIC BLOOD PRESSURE: 116 MMHG | HEIGHT: 70 IN | BODY MASS INDEX: 30.01 KG/M2

## 2023-07-25 DIAGNOSIS — Z79.899 HIGH RISK MEDICATION USE: ICD-10-CM

## 2023-07-25 DIAGNOSIS — M62.838 MUSCLE SPASM: Primary | ICD-10-CM

## 2023-07-25 DIAGNOSIS — M54.16 LUMBAR RADICULITIS: ICD-10-CM

## 2023-07-25 DIAGNOSIS — M51.36 DDD (DEGENERATIVE DISC DISEASE), LUMBAR: ICD-10-CM

## 2023-07-25 DIAGNOSIS — M54.12 CERVICAL RADICULOPATHY: ICD-10-CM

## 2023-07-25 DIAGNOSIS — M50.20 DISPLACEMENT OF CERVICAL INTERVERTEBRAL DISC WITHOUT MYELOPATHY: ICD-10-CM

## 2023-07-25 PROCEDURE — 99214 OFFICE O/P EST MOD 30 MIN: CPT

## 2023-07-25 RX ORDER — HYDROCODONE BITARTRATE AND ACETAMINOPHEN 10; 325 MG/1; MG/1
1 TABLET ORAL
Qty: 150 TABLET | Refills: 0 | Status: SHIPPED | OUTPATIENT
Start: 2023-07-25

## 2023-07-25 NOTE — PROGRESS NOTES
CHIEF COMPLAINT  Back and neck pain    Subjective   Colby Alvarado is a 57 y.o. male  who presents for follow-up.  He has a history of chronic neck and back pain. He reports that his pain level has fluctuated since his last office visit. Pain level varies based on activity level and work schedule.     Today pain is 6/10VAS in severity. Pain is located in his neck and lower back. Back pain is greater than neck pain. Describes this pain as an intermittent aching and burning. Pain is worsened by certain movements, prolonged positions (standing), bending/twisting, and walking long distances. Pain improves with rest, reposition, and medication.      Continues with Hydrocodone 10mg 5/day. He also takes Flexeril PRN. Denies any side effects from the regimen, including constipation and somnolence. The regimen helps decrease pain by a moderate amount. He works as a  and this helps gets through the day. ADL's by self. Denies any bowel or bladder changes.      Procedures:  511/23 - Bilateral L3-L5 RFA - 50% ongoing relief  3/16/23 - Bilateral L3-L5 MBB - 80% relief x 24 hours  11/10/22 - bilateral L3-L5 MBB - 80% relief x a few hours  8/23/22 - bilateral L4 LTFESI - 90% relief of radicular symptoms - no relief of back pain  5/19/22 - SHAY - 50% ongoing relief  4/5/22 - SHAY - 80% relief x a couple of weeks     He has a history of lumbar spine surgery 15 years ago with Dr. Moon.     Neck Pain   This is a chronic problem. The current episode started more than 1 year ago. The problem occurs daily. The problem has been waxing and waning. The pain is present in the left side, midline and right side. The quality of the pain is described as aching and burning. The pain is at a severity of 6/10. The symptoms are aggravated by position and twisting. Associated symptoms include numbness (hands) and weakness. Pertinent negatives include no chest pain, fever or headaches. He has tried NSAIDs, acetaminophen, neck  support, muscle relaxants and oral narcotics for the symptoms. The treatment provided mild relief.   Back Pain  The current episode started more than 1 month ago. The problem occurs daily. The problem is unchanged (unchanged since last visit). The pain is present in the lumbar spine. The quality of the pain is described as aching and burning. The pain radiates to the left thigh and right thigh (bilateral hips, lateral legs). The pain is at a severity of 6/10. The symptoms are aggravated by lying down, standing and sitting (work, prolonged position, lifting). Associated symptoms include numbness (hands) and weakness. Pertinent negatives include no abdominal pain, chest pain, dysuria, fever or headaches. He has tried NSAIDs and analgesics for the symptoms. The treatment provided mild relief.      PEG Assessment   What number best describes your pain on average in the past week?8  What number best describes how, during the past week, pain has interfered with your enjoyment of life?7  What number best describes how, during the past week, pain has interfered with your general activity?  7    The following portions of the patient's history were reviewed and updated as appropriate: allergies, current medications, past family history, past medical history, past social history, past surgical history, and problem list.    Review of Systems   Constitutional:  Negative for activity change, chills, fatigue and fever.   HENT:  Negative for congestion.    Eyes:  Negative for visual disturbance.   Respiratory:  Negative for chest tightness and shortness of breath.    Cardiovascular:  Negative for chest pain.   Gastrointestinal:  Negative for abdominal pain, constipation and diarrhea.   Genitourinary:  Negative for difficulty urinating and dysuria.   Musculoskeletal:  Positive for back pain and neck pain.   Neurological:  Positive for weakness and numbness (hands). Negative for dizziness, light-headedness and headaches.  "  Psychiatric/Behavioral:  Positive for sleep disturbance. Negative for agitation. The patient is not nervous/anxious.      I have reviewed and confirmed the accuracy of the ROS as documented by the MA/LPN/RN LUIS EDUARDO Leslie    Vitals:    07/25/23 0746   BP: 116/72   Pulse: 90   Temp: 97.1 °F (36.2 °C)   SpO2: 96%   Weight: 95.1 kg (209 lb 9.6 oz)   Height: 177.8 cm (70\")   PainSc:   6   PainLoc: Back     Objective   Physical Exam  Constitutional:       Appearance: Normal appearance.   HENT:      Head: Normocephalic.   Cardiovascular:      Rate and Rhythm: Normal rate and regular rhythm.   Pulmonary:      Effort: Pulmonary effort is normal.      Breath sounds: Normal breath sounds.   Musculoskeletal:      Cervical back: Normal range of motion. Tenderness and bony tenderness present. No pain with movement.      Lumbar back: Tenderness (slight tenderness noted) and bony tenderness present. Decreased range of motion. Positive right straight leg raise test and positive left straight leg raise test.   Skin:     General: Skin is warm and dry.      Capillary Refill: Capillary refill takes less than 2 seconds.   Neurological:      General: No focal deficit present.      Mental Status: He is alert and oriented to person, place, and time.   Psychiatric:         Mood and Affect: Mood normal.         Speech: Speech normal.         Behavior: Behavior normal.         Thought Content: Thought content normal.         Cognition and Memory: Cognition normal.     Assessment & Plan   Diagnoses and all orders for this visit:    1. Muscle spasm (Primary)    2. DDD (degenerative disc disease), lumbar    3. Lumbar radiculitis    4. Displacement of cervical intervertebral disc without myelopathy    5. Cervical radiculopathy    6. High risk medication use    --- The urine drug screen confirmation from 5/25/23 has been reviewed and the result is appropriate based on patient history and CARI report  --- The patient signed an updated " copy of the controlled substance agreement on 5/25/23  --- Continue Hydrocodone. DNF 7/27/23. Patient appears stable with current regimen. No adverse effects. Regarding continuation of opioids, there is no evidence of aberrant behavior or any red flags.  The patient continues with appropriate response to opioid therapy. ADL's remain intact by self.   --- Follow up 2 months or sooner if needed    Colby Alvarado reports a pain score of 6.  Given his pain assessment as noted, treatment options were discussed and the following options were decided upon as a follow-up plan to address the patient's pain: prescription for opiod analgesics and repeat procedures as needed .     CARI REPORT  As part of the patient's treatment plan, I am prescribing controlled substances. The patient has been made aware of appropriate use of such medications, including potential risk of somnolence, limited ability to drive and/or work safely, and the potential for dependence or overdose. It has also been made clear that these medications are for use by this patient only, without concomitant use of alcohol or other substances unless prescribed.     Patient has completed prescribing agreement detailing terms of continued prescribing of controlled substances, including monitoring CARI reports, urine drug screening, and pill counts if necessary. The patient is aware that inappropriate use will results in cessation of prescribing such medications.    As the clinician, I personally reviewed the CARI from 7/25/23 while the patient was in the office today.    History and physical exam exhibit continued safe and appropriate use of controlled substances.    Dictated utilizing Dragon dictation.

## 2023-08-15 DIAGNOSIS — M62.838 MUSCLE SPASM: ICD-10-CM

## 2023-08-15 RX ORDER — CYCLOBENZAPRINE HCL 10 MG
TABLET ORAL
Qty: 60 TABLET | Refills: 0 | Status: SHIPPED | OUTPATIENT
Start: 2023-08-15

## 2023-08-22 DIAGNOSIS — M51.36 DDD (DEGENERATIVE DISC DISEASE), LUMBAR: ICD-10-CM

## 2023-08-22 DIAGNOSIS — M54.16 LUMBAR RADICULITIS: ICD-10-CM

## 2023-08-22 RX ORDER — HYDROCODONE BITARTRATE AND ACETAMINOPHEN 10; 325 MG/1; MG/1
1 TABLET ORAL
Qty: 150 TABLET | Refills: 0 | Status: SHIPPED | OUTPATIENT
Start: 2023-08-22

## 2023-08-22 NOTE — TELEPHONE ENCOUNTER
Rx Refill Note  Requested Prescriptions     Pending Prescriptions Disp Refills    HYDROcodone-acetaminophen (NORCO)  MG per tablet 150 tablet 0     Sig: Take 1 tablet by mouth 5 (Five) Times a Day. 30 day supply      Last office visit with prescribing clinician: 7/25/2023   Last telemedicine visit with prescribing clinician: Visit date not found   Next office visit with prescribing clinician: 9/25/2023                         Would you like a call back once the refill request has been completed: [] Yes [] No    If the office needs to give you a call back, can they leave a voicemail: [] Yes [] No    Nedra Ulrich CMA  08/22/23, 13:10 EDT

## 2023-09-06 RX ORDER — METOPROLOL SUCCINATE 100 MG/1
TABLET, EXTENDED RELEASE ORAL
Qty: 135 TABLET | Refills: 0 | Status: SHIPPED | OUTPATIENT
Start: 2023-09-06

## 2023-09-16 DIAGNOSIS — M62.838 MUSCLE SPASM: ICD-10-CM

## 2023-09-19 RX ORDER — CYCLOBENZAPRINE HCL 10 MG
TABLET ORAL
Qty: 60 TABLET | Refills: 1 | Status: SHIPPED | OUTPATIENT
Start: 2023-09-19

## 2023-09-22 ENCOUNTER — TELEPHONE (OUTPATIENT)
Dept: PAIN MEDICINE | Facility: CLINIC | Age: 58
End: 2023-09-22
Payer: COMMERCIAL

## 2023-09-22 DIAGNOSIS — M51.36 DDD (DEGENERATIVE DISC DISEASE), LUMBAR: ICD-10-CM

## 2023-09-22 DIAGNOSIS — M54.16 LUMBAR RADICULITIS: ICD-10-CM

## 2023-09-22 RX ORDER — HYDROCODONE BITARTRATE AND ACETAMINOPHEN 10; 325 MG/1; MG/1
1 TABLET ORAL
Qty: 150 TABLET | Refills: 0 | Status: SHIPPED | OUTPATIENT
Start: 2023-09-22

## 2023-09-22 NOTE — TELEPHONE ENCOUNTER
I do not see a refill request for this medication. I can send one over but it is not due to be filled until 9/25/23. Hopefully they will have some in stock by then.

## 2023-09-22 NOTE — TELEPHONE ENCOUNTER
Caller: LYUDMILA DENNY     Relationship: SELF    Best call back number: [phone]    Requested Prescriptions:   HYDROCODONE 10    HIS PHARMACY DOESN'T HAVE IT AND NONE THAT HE HAS CALLED DOES - PATIENT WANTS TO KNOW WHAT HE SHOULD DO       Last office visit with prescribing clinician: 7/25/2023   Last telemedicine visit with prescribing clinician: Visit date not found   Next office visit with prescribing clinician: 9/28/2023         Does the patient have less than a 3 day supply:  [x] Yes  [] No    Would you like a call back once the refill request has been completed: [x] Yes [] No    If the office needs to give you a call back, can they leave a voicemail: [x] Yes [] No

## 2023-09-28 ENCOUNTER — OFFICE VISIT (OUTPATIENT)
Dept: PAIN MEDICINE | Facility: CLINIC | Age: 58
End: 2023-09-28
Payer: COMMERCIAL

## 2023-09-28 ENCOUNTER — PREP FOR SURGERY (OUTPATIENT)
Dept: SURGERY | Facility: SURGERY CENTER | Age: 58
End: 2023-09-28
Payer: COMMERCIAL

## 2023-09-28 VITALS
SYSTOLIC BLOOD PRESSURE: 150 MMHG | BODY MASS INDEX: 29.92 KG/M2 | WEIGHT: 209 LBS | TEMPERATURE: 98.5 F | HEART RATE: 96 BPM | DIASTOLIC BLOOD PRESSURE: 70 MMHG | RESPIRATION RATE: 18 BRPM | HEIGHT: 70 IN | OXYGEN SATURATION: 97 %

## 2023-09-28 DIAGNOSIS — M51.36 DDD (DEGENERATIVE DISC DISEASE), LUMBAR: Primary | ICD-10-CM

## 2023-09-28 DIAGNOSIS — M54.16 LUMBAR RADICULITIS: ICD-10-CM

## 2023-09-28 DIAGNOSIS — Z79.899 HIGH RISK MEDICATION USE: ICD-10-CM

## 2023-09-28 DIAGNOSIS — M62.838 MUSCLE SPASM: ICD-10-CM

## 2023-09-28 DIAGNOSIS — M54.12 CERVICAL RADICULOPATHY: ICD-10-CM

## 2023-09-28 DIAGNOSIS — M54.12 CERVICAL RADICULOPATHY: Primary | ICD-10-CM

## 2023-09-28 DIAGNOSIS — M50.20 DISPLACEMENT OF CERVICAL INTERVERTEBRAL DISC WITHOUT MYELOPATHY: ICD-10-CM

## 2023-09-28 PROCEDURE — 99214 OFFICE O/P EST MOD 30 MIN: CPT

## 2023-09-28 RX ORDER — SODIUM CHLORIDE 0.9 % (FLUSH) 0.9 %
10 SYRINGE (ML) INJECTION AS NEEDED
OUTPATIENT
Start: 2023-09-28

## 2023-09-28 RX ORDER — SODIUM CHLORIDE 0.9 % (FLUSH) 0.9 %
10 SYRINGE (ML) INJECTION EVERY 12 HOURS SCHEDULED
OUTPATIENT
Start: 2023-09-28

## 2023-09-28 NOTE — PROGRESS NOTES
CHIEF COMPLAINT  Back and neck pain    Subjective   Colby Alvarado is a 57 y.o. male  who presents for follow-up.  He has a history of chronic neck and back pain. He reports that his back pain has remained consistent since his last office visit. He reports that his neck pain has slightly worsened and would like to discuss repeating a SHAY.    Today pain is 8/10VAS in severity. Pain is located in his neck and lower back. He reports that his neck pan is worse than his back pain. Pain intermittently radiates into bilateral upper extremities. Describes this pain as an intermittent aching and burning. Pain is worsened by certain movements, prolonged positions (standing), bending/twisting, and walking long distances. Pain improves with rest, reposition, and medication.      Continues with Hydrocodone 10mg 5/day. He also takes Flexeril PRN. Denies any side effects from the regimen, including constipation and somnolence. The regimen helps decrease pain by a moderate amount. He works as a  and this helps gets through the day. ADL's by self. Denies any bowel or bladder changes.      Procedures:  511/23 - Bilateral L3-L5 RFA - 50% ongoing relief  3/16/23 - Bilateral L3-L5 MBB - 80% relief x 24 hours  11/10/22 - bilateral L3-L5 MBB - 80% relief x a few hours  8/23/22 - bilateral L4 LTFESI - 90% relief of radicular symptoms - no relief of back pain  5/19/22 - SHAY - 50% ongoing relief  4/5/22 - SHAY - 80% relief x a couple of weeks     He has a history of lumbar spine surgery 15 years ago with Dr. Moon.     Neck Pain   This is a chronic problem. The current episode started more than 1 year ago. The problem occurs daily. The problem has been gradually worsening. The pain is present in the left side, midline and right side. The quality of the pain is described as aching and burning. The pain is at a severity of 8/10. The symptoms are aggravated by position and twisting. Associated symptoms include numbness  (hands) and weakness (hands). Pertinent negatives include no chest pain, fever or headaches. He has tried NSAIDs, acetaminophen, neck support, muscle relaxants and oral narcotics for the symptoms. The treatment provided mild relief.   Back Pain  The current episode started more than 1 month ago. The problem occurs daily. The problem has been waxing and waning since onset. The pain is present in the lumbar spine. The quality of the pain is described as aching and burning. The pain radiates to the left thigh and right thigh (bilateral hips, lateral legs). The pain is at a severity of 6/10. The symptoms are aggravated by lying down, standing and sitting (work, prolonged position, lifting). Associated symptoms include numbness (hands) and weakness (hands). Pertinent negatives include no abdominal pain, chest pain, dysuria, fever or headaches. He has tried NSAIDs and analgesics for the symptoms. The treatment provided mild relief.      PEG Assessment   What number best describes your pain on average in the past week?6  What number best describes how, during the past week, pain has interfered with your enjoyment of life?8  What number best describes how, during the past week, pain has interfered with your general activity?  8    Review of Pertinent Medical Data ---            The following portions of the patient's history were reviewed and updated as appropriate: allergies, current medications, past family history, past medical history, past social history, past surgical history, and problem list.    Review of Systems   Constitutional:  Negative for activity change, fatigue and fever.   HENT:  Negative for congestion.    Respiratory:  Negative for cough and chest tightness.    Cardiovascular:  Negative for chest pain.   Gastrointestinal:  Negative for abdominal pain, constipation and diarrhea.   Genitourinary:  Negative for difficulty urinating and dysuria.   Musculoskeletal:  Positive for back pain and neck pain.  "  Neurological:  Positive for weakness (hands) and numbness (hands). Negative for dizziness, light-headedness and headaches.   Psychiatric/Behavioral:  Positive for sleep disturbance (pain). Negative for agitation and suicidal ideas. The patient is not nervous/anxious.      I have reviewed and confirmed the accuracy of the ROS as documented by the MA/LPN/RN LUIS EDUARDO Leslie    Vitals:    09/28/23 0805   BP: 150/70   BP Location: Left arm   Patient Position: Sitting   Cuff Size: Large Adult   Pulse: 96   Resp: 18   Temp: 98.5 °F (36.9 °C)   TempSrc: Temporal   SpO2: 97%   Weight: 94.8 kg (209 lb)   Height: 177.8 cm (70\")   PainSc:   8     Objective   Physical Exam  Constitutional:       Appearance: Normal appearance.   HENT:      Head: Normocephalic.   Cardiovascular:      Rate and Rhythm: Normal rate and regular rhythm.   Pulmonary:      Effort: Pulmonary effort is normal.      Breath sounds: Normal breath sounds.   Musculoskeletal:      Cervical back: Normal range of motion. Tenderness and bony tenderness present. No pain with movement.      Lumbar back: Tenderness (slight tenderness noted) and bony tenderness present. Decreased range of motion. Positive right straight leg raise test and positive left straight leg raise test.   Skin:     General: Skin is warm and dry.      Capillary Refill: Capillary refill takes less than 2 seconds.   Neurological:      General: No focal deficit present.      Mental Status: He is alert and oriented to person, place, and time.   Psychiatric:         Mood and Affect: Mood normal.         Speech: Speech normal.         Behavior: Behavior normal.         Thought Content: Thought content normal.         Cognition and Memory: Cognition normal.     Assessment & Plan   Diagnoses and all orders for this visit:    1. DDD (degenerative disc disease), lumbar (Primary)    2. Muscle spasm    3. Displacement of cervical intervertebral disc without myelopathy    4. Lumbar radiculitis    5. " Cervical radiculopathy    6. High risk medication use    --- SHAY  ---  Indications for epidural injection:  Plan is to proceed with epidural at the appropriate level.  If the patient receives significant pain reduction and improvement in function and the plan will be to repeat the epidural when the pain worsens.  If a second epidural provides at least 6 weeks of sustained improvement that includes both pain reduction and improvement in function then an epidural injection could be repeated once again at the same level.  This is a mutual decision between the clinician and the patient that includes discussions including risks and benefits in detail as well as alternative therapies.  Patient's questions were answered to their satisfaction and to their understanding.  ---  --- The urine drug screen confirmation from 5/25/23 has been reviewed and the result is appropriate based on patient history and CARI report  --- The patient signed an updated copy of the controlled substance agreement on 5/25/23  --- Continue Hydrocodone. No refill needed at this time. Patient appears stable with current regimen. No adverse effects. Regarding continuation of opioids, there is no evidence of aberrant behavior or any red flags.  The patient continues with appropriate response to opioid therapy. ADL's remain intact by self.   --- Follow-up for procedure or 2 months for medication management     Colby Alvarado reports a pain score of 8.  Given his pain assessment as noted, treatment options were discussed and the following options were decided upon as a follow-up plan to address the patient's pain: continuation of current treatment plan for pain, prescription for opiod analgesics, and patient scheduled for SHAY.     CARI REPORT  As part of the patient's treatment plan, I am prescribing controlled substances. The patient has been made aware of appropriate use of such medications, including potential risk of somnolence, limited  ability to drive and/or work safely, and the potential for dependence or overdose. It has also been made clear that these medications are for use by this patient only, without concomitant use of alcohol or other substances unless prescribed.     Patient has completed prescribing agreement detailing terms of continued prescribing of controlled substances, including monitoring CARI reports, urine drug screening, and pill counts if necessary. The patient is aware that inappropriate use will results in cessation of prescribing such medications.    As the clinician, I personally reviewed the CARI from 9/28/23 while the patient was in the office today.    History and physical exam exhibit continued safe and appropriate use of controlled substances.    Dictated utilizing Dragon dictation.

## 2023-10-25 ENCOUNTER — TRANSCRIBE ORDERS (OUTPATIENT)
Dept: SURGERY | Facility: SURGERY CENTER | Age: 58
End: 2023-10-25
Payer: COMMERCIAL

## 2023-10-25 DIAGNOSIS — Z41.9 SURGERY, ELECTIVE: Primary | ICD-10-CM

## 2023-10-26 DIAGNOSIS — M54.16 LUMBAR RADICULITIS: ICD-10-CM

## 2023-10-26 DIAGNOSIS — M51.36 DDD (DEGENERATIVE DISC DISEASE), LUMBAR: ICD-10-CM

## 2023-10-26 RX ORDER — HYDROCODONE BITARTRATE AND ACETAMINOPHEN 10; 300 MG/1; MG/1
1 TABLET ORAL
Qty: 150 TABLET | Refills: 0 | Status: SHIPPED | OUTPATIENT
Start: 2023-10-28 | End: 2023-10-27

## 2023-10-26 RX ORDER — HYDROCODONE BITARTRATE AND ACETAMINOPHEN 10; 325 MG/1; MG/1
1 TABLET ORAL
Qty: 150 TABLET | Refills: 0 | Status: CANCELLED | OUTPATIENT
Start: 2023-10-26

## 2023-10-26 NOTE — TELEPHONE ENCOUNTER
"    Caller: LYUDMILA CARRION \"NONA\" DENISHA     Relationship: PATIENT     Best call back number: [phone]    Requested Prescriptions:   Requested Prescriptions     Pending Prescriptions Disp Refills    HYDROcodone-acetaminophen (NORCO)  MG per tablet 150 tablet 0     Sig: Take 1 tablet by mouth 5 (Five) Times a Day. 30 day supply        Pharmacy where request should be sent:    PLEASE SEND TO THIS PHARMACY  [facility] [address]  [facility]  [id number]  [phone] PHONE  [phone] FAX    Last office visit with prescribing clinician: 9/28/2023   Last telemedicine visit with prescribing clinician: Visit date not found   Next office visit with prescribing clinician: Visit date not found     Additional details provided by patient: [facility] DO[facility] NOT [facility].  [facility].  CAN YOU REVISE RX QUANTITY AND DOSAGE?  PATIENT REQUESTING CALL BACK SO HE WILL KNOW - PATIENT STATES HE HAS ONE DAY SUPPLY REMAINING AND WITH THE WEEKEND APPROACHING HE WOULD LIKE TO DISCUSS.      Does the patient have less than a 3 day supply:  [x] Yes  [] No    Would you like a call back once the refill request has been completed: [x] Yes [] No    If the office needs to give you a call back, can they leave a voicemail: [x] Yes [] No    Jak Weldon Rep   10/26/23 08:40 EDT         "

## 2023-10-27 ENCOUNTER — TELEPHONE (OUTPATIENT)
Dept: PAIN MEDICINE | Facility: CLINIC | Age: 58
End: 2023-10-27
Payer: COMMERCIAL

## 2023-10-27 RX ORDER — HYDROCODONE BITARTRATE AND ACETAMINOPHEN 7.5; 325 MG/1; MG/1
1.5 TABLET ORAL
Qty: 225 TABLET | Refills: 0 | Status: SHIPPED | OUTPATIENT
Start: 2023-10-27 | End: 2023-11-26

## 2023-10-27 NOTE — TELEPHONE ENCOUNTER
"Caller: Colby Alvarado \"NONA\"    Relationship to patient: Self    Best call back number: [phone] (home)     Patient is needing: PATIENT IS CALLING BACK IN REGARDS TO HIS HYDROCODONE REFILL THAT HE CALLED IN YESTERDAY. HE NEEDS TO KNOW IF DR VALENTINO CAN SIGN FOR THE PATIENT TO GET THE 5MG AND TAKE TWO PILLS AT ONCE SINCE HE IS PRESCRIBED THE  10MG. THEY HAVE IT IN STOCK AT THE [facility] IN [place] ON . PLEASE ADVISE   "

## 2023-10-27 NOTE — TELEPHONE ENCOUNTER
I verified with the patients pharmacy and they have some 5-325 mg and 7.5-325 mg in stock. Can you please change to one of these as they do not have any  mg in stock.

## 2023-11-27 ENCOUNTER — TELEPHONE (OUTPATIENT)
Dept: PAIN MEDICINE | Facility: CLINIC | Age: 58
End: 2023-11-27
Payer: COMMERCIAL

## 2023-11-27 DIAGNOSIS — M51.36 DDD (DEGENERATIVE DISC DISEASE), LUMBAR: Primary | ICD-10-CM

## 2023-11-27 DIAGNOSIS — M50.20 DISPLACEMENT OF CERVICAL INTERVERTEBRAL DISC WITHOUT MYELOPATHY: ICD-10-CM

## 2023-11-27 RX ORDER — HYDROCODONE BITARTRATE AND ACETAMINOPHEN 10; 325 MG/1; MG/1
TABLET ORAL
Qty: 20 TABLET | Refills: 0 | Status: SHIPPED | OUTPATIENT
Start: 2023-11-27 | End: 2023-12-01 | Stop reason: SDUPTHER

## 2023-11-27 NOTE — TELEPHONE ENCOUNTER
His medication appointments are every 2 months. This was probably missed due to the procedure being scheduled. I will send over a partial supply to last until his appointment on Friday.

## 2023-11-27 NOTE — TELEPHONE ENCOUNTER
Caller: LYUDMILA DENNY    Relationship: SELF    Best call back number: [phone]    Requested Prescriptions: HYDROCODONE 10-325MG    Pharmacy where request should be sent: [facility] [address]   [facility] [place]    Last office visit with prescribing clinician: Visit date not found     Next office visit with prescribing clinician: Visit date not found     Additional details provided by patient: PT SAID THEY HAVE HIS MEDICATION IN STOCK    Does the patient have less than a 3 day supply:  [x] Yes  [] No    Would you like a call back once the refill request has been completed: [] Yes [x] No    If the office needs to give you a call back, can they leave a voicemail: [x] Yes [] No    Lindsay Sparks  11/27/23 08:30 EST

## 2023-11-27 NOTE — TELEPHONE ENCOUNTER
Patient states that he typically will have a procedure one month and a follow up the next month and doesn't understand why this is changing. He did make an appointment for Friday December 1, but states he is out of medication currently.

## 2023-11-27 NOTE — TELEPHONE ENCOUNTER
PATIENT CALLED BACK.  I EXPLAINED WE COULD SET UP AN OFFICE APPOINTMENT FOR HIM.  HE SAID HE HAS A PROCEDURE TOMORROW.  I TRIED TO EXPLAIN A PROCEDURE IS DIFFERENT FROM AN IN OFFICE VISIT.  PATIENT DISCONNECTED THE CALL SINCE HE SAID I WASN'T IN THE OFFICE.  PLEASE CONTACT HIM AND EXPLAIN TO HIM AND SET UP OFFICE VISIT FOR HIM

## 2023-11-28 ENCOUNTER — HOSPITAL ENCOUNTER (OUTPATIENT)
Facility: SURGERY CENTER | Age: 58
Setting detail: HOSPITAL OUTPATIENT SURGERY
Discharge: HOME | End: 2023-11-28
Attending: ANESTHESIOLOGY | Admitting: ANESTHESIOLOGY
Payer: COMMERCIAL

## 2023-11-28 ENCOUNTER — HOSPITAL ENCOUNTER (OUTPATIENT)
Dept: GENERAL RADIOLOGY | Facility: SURGERY CENTER | Age: 58
Setting detail: HOSPITAL OUTPATIENT SURGERY
End: 2023-11-28
Payer: COMMERCIAL

## 2023-11-28 VITALS
DIASTOLIC BLOOD PRESSURE: 67 MMHG | HEART RATE: 72 BPM | BODY MASS INDEX: 30.78 KG/M2 | SYSTOLIC BLOOD PRESSURE: 138 MMHG | TEMPERATURE: 98 F | HEIGHT: 70 IN | RESPIRATION RATE: 20 BRPM | WEIGHT: 215 LBS | OXYGEN SATURATION: 98 %

## 2023-11-28 DIAGNOSIS — Z41.9 SURGERY, ELECTIVE: ICD-10-CM

## 2023-11-28 DIAGNOSIS — M54.12 CERVICAL RADICULOPATHY: ICD-10-CM

## 2023-11-28 PROCEDURE — S0260 H&P FOR SURGERY: HCPCS | Performed by: ANESTHESIOLOGY

## 2023-11-28 PROCEDURE — 25010000002 BUPIVACAINE (PF) 0.5 % SOLUTION 10 ML VIAL: Performed by: ANESTHESIOLOGY

## 2023-11-28 PROCEDURE — 62321 NJX INTERLAMINAR CRV/THRC: CPT | Performed by: ANESTHESIOLOGY

## 2023-11-28 PROCEDURE — 25510000001 IOPAMIDOL 61 % SOLUTION 30 ML VIAL: Performed by: ANESTHESIOLOGY

## 2023-11-28 PROCEDURE — 25010000002 METHYLPREDNISOLONE PER 80 MG: Performed by: ANESTHESIOLOGY

## 2023-11-28 PROCEDURE — 76000 FLUOROSCOPY <1 HR PHYS/QHP: CPT

## 2023-11-28 PROCEDURE — 77002 NEEDLE LOCALIZATION BY XRAY: CPT

## 2023-11-28 RX ORDER — SODIUM CHLORIDE 0.9 % (FLUSH) 0.9 %
10 SYRINGE (ML) INJECTION EVERY 12 HOURS SCHEDULED
Status: DISCONTINUED | OUTPATIENT
Start: 2023-11-28 | End: 2023-11-28 | Stop reason: HOSPADM

## 2023-11-28 RX ORDER — SODIUM CHLORIDE 0.9 % (FLUSH) 0.9 %
10 SYRINGE (ML) INJECTION AS NEEDED
Status: DISCONTINUED | OUTPATIENT
Start: 2023-11-28 | End: 2023-11-28 | Stop reason: HOSPADM

## 2023-11-28 NOTE — DISCHARGE INSTRUCTIONS
[facility] - Post-procedure Instructions          --  While there are no absolute restrictions, it is recommended that you do not perform strenuous activity today. In the morning, you may resume your level of activity as before your block.    --  If you have a band-aid at your injection site, please remove it later today. Observe the area for any redness, swelling, pus-like drainage, or a temperature over 101°. If any of these symptoms occur, please call your doctor at [phone]. If after office hours, leave a message and the on-call provider will return your call.    --  Ice may be applied to your injection site. It is recommended you avoid direct heat (heating pad; hot tub) for 1-2 days.    --  Call [facility] at [phone] if you experience persistent headache, persistent bleeding from the injection site, or severe pain not relieved by heat or oral medication.    --  Do not make important decisions today.    --  Due to the effects of the block and/or the I.V. Sedation, DO NOT drive or operate hazardous machinery for 12 hours.  Local anesthetics may cause numbness after procedure and precautions must be taken with regards to operating equipment as well as with walking, even if ambulating with assistance of another person or with an assistive device.    --  Do not drink alcohol for 12 hours.    -- You may return to work tomorrow, or as directed by your referring doctor.    --  Occasionally you may notice a slight increase in your pain after the procedure. This should start to improve within the next 24-48 hours. Radiofrequency ablation procedure pain may last 3-4 weeks.    --  It may take as long as 3-4 days before you notice a gradual improvement in your pain and/or other symptoms.    -- You may continue to take your prescribed pain medication as needed.    --  Some normal possible side effects of steroid use could include fluid retention, increased blood sugar, dull headache,  increased sweating, increased appetite, mood swings and flushing.    --  Diabetics are recommended to watch their blood glucose level closely for 24-48 hours after the injection.    --  Must stay in PACU for 20 min upon arrival and prove no leg weakness before being discharged.    --  IN THE EVENT OF A LIFE THREATENING EMERGENCY, (CHEST PAIN, BREATHING DIFFICULTIES, PARALYSIS…) YOU SHOULD GO TO YOUR NEAREST EMERGENCY ROOM.    --  You should be contacted by our office within 2-3 days to schedule follow up or next appointment date.  If not contacted within 7 days, please call the office at [phone]

## 2023-11-28 NOTE — OP NOTE
Cervical Epidural Steroid Injection @ C5-C6  [facility]    PREOPERATIVE DIAGNOSIS:  Cervical Spinal Stenosis and Bilateral Cervical Radiculopathy  POSTOPERATIVE DIAGNOSIS:  Same as preop diagnosis    PROCEDURE:   Cervical Epidural Steroid Injection, Therapeutic Translaminar Injection, with epidurogram, at  C5-C6 level    PRE-PROCEDURE DISCUSSION WITH PATIENT:    Risks and complications were discussed with the patient prior to starting the procedure and informed consent was obtained.  We discussed various topics including but not limited to bleeding, infection, injury, paralysis, nerve injury, dural puncture, coma, death, worsening of clinical picture, lack of pain relief, and postprocedural soreness.    SURGEON:  Cruz Aguayo MD    REASON FOR PROCEDURE:   Previous clinically significant therapeutic effect is noted., Degenerative changes are noted in the area., Stenotic area is noted, and is likely contributing to this chronic &/or recurrent pain. , and Radiating pattern of pain is likely consistent with degenerative changes in the area.    SEDATION:  Patient declined administration of moderate sedation   and , but an IV access was obtained for safety.  ANESTHETIC:  Marcaine 0.125 %  STEROID:   Methylprednisolone (DEPO MEDROL) 80mg/ml    DESCRIPTON OF PROCEDURE:    After obtaining informed consent, I.V. was started in the preop area.   The patient was taken to the operating room and placed in the prone position.  EKG, blood pressure, and pulse oximeter were monitored throughout,  by the RN under my guidance. All pressure points were well padded.  The cervicothoracic spine area was prepped with Chloraprep and draped in a sterile fashion.  Under fluoroscopic guidance, the aforementioned interlaminar space was identified. Skin and subcutaneous tissues were anesthetized with 1% lidocaine in the middle of the space. A Tuohy needle was introduced through the skin and advanced to this interlaminar  space and into the epidural space under fluoroscopic guidance and verified with loss-of-resistance technique to air.  After confirming the position of the needle with the fluoroscope with all the views, and after aspiration was confirmed negative for blood and CSF, 1.5 mL of Omnipaque was injected.  After seeing appropriate epidurogram with lateral and PA views, a total of 3 cc solution was injected, consisting of 1cc of local anesthetic as above, with normal saline and injectable steroid as above.     ESTIMATED BLOOD LOSS:  <5 mL  SPECIMENS:  None    COMPLICATIONS:     No complications were noted., There was no indication of vascular uptake on live injection of contrast dye., There was no indication of intrathecal uptake on live injection of contrast dye., There was not any evidence of dural puncture.  , and The patient did not have any signs of postprocedure numbness nor weakness.    TOLERANCE & DISCHARGE CONDITION:    The patient tolerated the procedure well.  The patient was transported to the recovery area without difficulties.  The patient was discharged to home under the care of family in stable and satisfactory condition.    PLAN OF CARE:  The patient was given our standard instruction sheet.  The patient will Return to clinic 1-2 wks.  The patient will resume all medications as per the medication reconciliation sheet.

## 2023-11-28 NOTE — H&P
Brief Pre-procedural / Pre-operative H&P        -----    Pertinent Diagnosis:   Bilateral cervical radiculopathy.  Cervical spinal stenosis    Proposed Procedure: Cervical epidural steroid injection, anticipated C5-C6      Subjective   Colby Alvarado is a 57 y.o. male  who presents for intervention.  He has a history of neck pain.      History of Present Illness     He has had some worsening of neck pain.  He has had significant therapeutic relief from cervical epidural previously that lasted more than 50% relief for more than a few months.  In fact on review of previous note the relief lasted well over a year.    --- Aching and burning pain and there is radiating pain below bilaterally into both of her extremities.----    There is moderate spinal stenosis at C4-5 and C5-6 and C6-7    The following portions of the patient's history were reviewed and updated as appropriate: allergies, current medications, past family history, past medical history, past social history, past surgical history and problem list.    No Known Allergies    No current facility-administered medications for this encounter.    No current facility-administered medications on file prior to encounter.     Current Outpatient Medications on File Prior to Encounter   Medication Sig Dispense Refill    atorvastatin (LIPITOR) 80 MG tablet Take 1 tablet by mouth Every Night.      cetirizine (zyrTEC) 10 MG tablet Take 1 tablet by mouth Daily.      cyclobenzaprine (FLEXERIL) 10 MG tablet TAKE 1 TABLET BY MOUTH 2 TIMES A DAY AS NEEDED FOR MUSCLE SPASMS. 60 tablet 1    ezetimibe (ZETIA) 10 MG tablet Take 1 tablet by mouth every night at bedtime.      Galcanezumab-gnlm (Emgality, 300 MG Dose,) 100 MG/ML solution prefilled syringe As Needed.      lisinopril (PRINIVIL,ZESTRIL) 10 MG tablet Take 1 tablet by mouth 2 (two) times a day. 60 tablet 6    metoprolol succinate XL (TOPROL-XL) 100 MG 24 hr tablet TAKE 1 TABLET BY MOUTH DAILY  TAKE AN EXTRA 1/2 TABLET  DAILY  AS NEEDED 135 tablet 0    SUMAtriptan Succinate (IMITREX) 6 MG/0.5ML injection sumatriptan 6 mg/0.5 mL subcutaneous pen injector      zolpidem (AMBIEN) 10 MG tablet Take 1 tablet by mouth At Night As Needed for Sleep.         Patient Active Problem List   Diagnosis    Complete tear of right rotator cuff    PAF (paroxysmal atrial fibrillation)    Essential hypertension    PRYOR (dyspnea on exertion)    Palpitations    Precordial pain    DDD (degenerative disc disease), cervical    Cervical radiculopathy    Lumbar radiculitis    DDD (degenerative disc disease), lumbar    Lumbar facet arthropathy       Past Medical History:   Diagnosis Date    Anxiety     Arthritis of back     Atrial fibrillation     Chronic pain     Depression     H/O cluster headache     Hearing loss     Hyperlipidemia     Hypertension     Insomnia     Periarthritis of shoulder     Seasonal allergies     Sleep apnea     using CPAP       Past Surgical History:   Procedure Laterality Date    BACK SURGERY      CARDIAC ABLATION  07/31/2019    CERVICAL EPIDURAL Bilateral 4/5/2022    Procedure: CERVICAL EPIDURAL;  Surgeon: Cruz Aguayo MD;  Location: [facility] MAIN OR;  Service: Pain Management;  Laterality: Bilateral;    CERVICAL EPIDURAL N/A 5/19/2022    Procedure: CERVICAL EPIDURAL;  Surgeon: Cruz Aguayo MD;  Location: [facility] MA[facility] OR;  Service: Pain Management;  Laterality: N/A;    EPIDURAL Bilateral 8/23/2022    Procedure: L4 TRANSFORAMINAL LUMBAR EPIDURAL STEROID INJECTION;  Surgeon: Cruz Aguayo MD;  Location: [facility] MA[facility] OR;  Service: Pain Management;  Laterality: Bilateral;    EYE MUSCLE SURGERY Left     HAND SURGERY Right     open fracture plate    HARDWARE REMOVAL Right     rt hand  plate    HERNIA REPAIR      MEDIAL BRANCH BLOCK Bilateral 11/10/2022    Procedure: MEDIAL BRANCH BLOCK LUMBAR bilateral L3-L5;  Surgeon: Cruz Aguayo MD;  Location: [facility] MA[facility] OR;  Service: Pain Management;  Laterality: Bilateral;    MEDIAL  BRANCH BLOCK Bilateral 3/16/2023    Procedure: BILATEAL L3-L5 LUMBAR MEDIAL BRANCH BLOCK;  Surgeon: Cruz Aguayo MD;  [place] [facility] [place] [facility];  Service: Pain Management;  Laterality: Bilateral;    NASAL FRACTURE SURGERY      RADIOFREQUENCY ABLATION Bilateral 2023    Procedure: BILATERAL L3-L5 RADIOFREQUENCY ABLATION LUMBAR CPT: 03632, 00718;  Surgeon: Cruz Aguayo MD;  Location: [facility];  Service: Pain Management;  Laterality: Bilateral;    SHOULDER ARTHROSCOPY W/ ROTATOR CUFF REPAIR Right 3/13/2019    Procedure: SHOULDER ARTHROSCOPY, DEBRIDEIMENT OF LABRUM AND SUBSCAP, DECEOMPRESSION, DISTAL CLAVICLE EXCISION,  WITH MINI OPEN ROTATOR CUFF REPAIR;  Surgeon: Estela Townsend MD;  Location: [facility];  Service: Orthopedics    SHOULDER SURGERY      SPINE SURGERY      VASECTOMY         Family History   Problem Relation Age of Onset    Hypertension Other     Heart disease Other     Lung disease Other     Emphysema Mother     Hypertension Mother     Heart disease Mother     Cancer Mother         Oral    Hypertension Sister     No Known Problems Brother     Heart attack Maternal Grandmother     Emphysema Maternal Grandmother     Heart disease Maternal Grandmother     Stroke Maternal Grandmother     Heart attack Maternal Grandfather     Emphysema Maternal Grandfather     Malig Hyperthermia Neg Hx        Social History     Socioeconomic History    Marital status: Significant Other   Tobacco Use    Smoking status: Former     Packs/day: 1.50     Years: 30.00     Additional pack years: 0.00     Total pack years: 45.00     Types: Cigarettes     Quit date: 2021     Years since quittin.7    Smokeless tobacco: Former     Types: Snuff   Vaping Use    Vaping Use: Some days    Substances: Nicotine    Devices: Pre-filled or refillable cartridge   Substance and Sexual Activity    Alcohol use: Yes     Alcohol/week: 16.0 standard drinks of alcohol     Types: 16 Cans of beer per week     Comment: occ     "Drug use: No    Sexual activity: Defer       -------       Review of Systems  No Fever, No Chills, No ear pain, No sinus pressure or drainage, No eye pain or drainage, No cough, No SOB, No chest tightness nor chest pain, no palpitations.          Vitals:    11/28/23 0853   BP: 131/72   BP Location: Left arm   Patient Position: Sitting   Pulse: 78   Resp: 16   Temp: 97 °F (36.1 °C)   TempSrc: Temporal   SpO2: 95%   Weight: 97.5 kg (215 lb)   Height: 177.8 cm (70\")         Objective   Physical Exam  VSS, NNR, NCAT, NMNA, NRD, AAOx3.      -------    Assessment & Plan:  - as noted above, the stated intervention is indicated  - Follow-up plan will be noted in the operative report        Does have a follow-up later this week      EMR Dragon/Transcription disclaimer:   Typed items in this encounter note may have been created by electronic transcription/translation software which converts spoken language to printed text. The electronic translation of spoken language may permit erroneous, or at times, nonsensical words or phrases to be inadvertently transcribed; Although I have reviewed the note for such errors, some may still exist.      "

## 2023-12-01 ENCOUNTER — OFFICE VISIT (OUTPATIENT)
Dept: PAIN MEDICINE | Facility: CLINIC | Age: 58
End: 2023-12-01
Payer: COMMERCIAL

## 2023-12-01 VITALS
TEMPERATURE: 97.1 F | HEART RATE: 78 BPM | OXYGEN SATURATION: 96 % | RESPIRATION RATE: 18 BRPM | SYSTOLIC BLOOD PRESSURE: 118 MMHG | HEIGHT: 70 IN | WEIGHT: 215 LBS | BODY MASS INDEX: 30.78 KG/M2 | DIASTOLIC BLOOD PRESSURE: 70 MMHG

## 2023-12-01 DIAGNOSIS — M54.12 CERVICAL RADICULOPATHY: ICD-10-CM

## 2023-12-01 DIAGNOSIS — M51.36 DDD (DEGENERATIVE DISC DISEASE), LUMBAR: Primary | ICD-10-CM

## 2023-12-01 DIAGNOSIS — M54.16 LUMBAR RADICULITIS: ICD-10-CM

## 2023-12-01 DIAGNOSIS — M50.20 DISPLACEMENT OF CERVICAL INTERVERTEBRAL DISC WITHOUT MYELOPATHY: ICD-10-CM

## 2023-12-01 DIAGNOSIS — Z79.899 HIGH RISK MEDICATION USE: ICD-10-CM

## 2023-12-01 DIAGNOSIS — M62.838 MUSCLE SPASM: ICD-10-CM

## 2023-12-01 PROCEDURE — 99214 OFFICE O/P EST MOD 30 MIN: CPT

## 2023-12-01 RX ORDER — HYDROCODONE BITARTRATE AND ACETAMINOPHEN 10; 325 MG/1; MG/1
TABLET ORAL
Qty: 150 TABLET | Refills: 0 | Status: SHIPPED | OUTPATIENT
Start: 2023-12-01 | End: 2023-12-04 | Stop reason: SDUPTHER

## 2023-12-01 RX ORDER — CYCLOBENZAPRINE HCL 10 MG
TABLET ORAL
Qty: 60 TABLET | Refills: 2 | Status: SHIPPED | OUTPATIENT
Start: 2023-12-01

## 2023-12-01 NOTE — PROGRESS NOTES
CHIEF COMPLAINT  Back and neck pain    Subjective   Colby Alvarado is a 57 y.o. male  who presents to the office for follow-up of procedure.  He completed a C5-C6 SHAY on 11/28/23 performed by Dr. Aguayo for management of neck pain. Patient reports 60% relief from the procedure.     Today pain is 6/10VAS in severity. Pain is located in his neck and lower back. He reports that his neck pan is worse than his back pain. Pain intermittently radiates into bilateral upper extremities. Describes this pain as an intermittent aching and burning. Pain is worsened by certain movements, prolonged positions (standing), bending/twisting, and walking long distances. Pain improves with rest, reposition, and medication.      Continues with Hydrocodone 10mg 5/day. He also takes Flexeril PRN. Denies any side effects from the regimen, including constipation and somnolence. The regimen helps decrease pain by a moderate amount. He works as a  and this helps gets through the day. ADL's by self. Denies any bowel or bladder changes.      Procedures:  11/28/23 - C5-C6 SHAY - 60% ongoing relief  511/23 - Bilateral L3-L5 RFA - 50% ongoing relief  3/16/23 - Bilateral L3-L5 MBB - 80% relief x 24 hours  11/10/22 - bilateral L3-L5 MBB - 80% relief x a few hours  8/23/22 - bilateral L4 LTFESI - 90% relief of radicular symptoms - no relief of back pain  5/19/22 - SHAY - 50% ongoing relief  4/5/22 - SHAY - 80% relief x a couple of weeks     He has a history of lumbar spine surgery 15 years ago with Dr. Moon.     Back Pain  The current episode started more than 1 month ago. The problem occurs daily. The problem is unchanged (unchanged since last office visit). The pain is present in the lumbar spine. The quality of the pain is described as aching and burning. The pain radiates to the left thigh and right thigh (bilateral hips, lateral legs). The pain is at a severity of 6/10. The symptoms are aggravated by lying down, standing  and sitting (work, prolonged position, lifting). Pertinent negatives include no abdominal pain, chest pain, dysuria, fever, headaches, numbness or weakness. He has tried NSAIDs and analgesics for the symptoms. The treatment provided mild relief.   Neck Pain   This is a chronic problem. The current episode started more than 1 year ago. The problem occurs daily. The problem has been gradually improving. The pain is present in the left side, midline and right side. The quality of the pain is described as aching and burning. The pain is at a severity of 5/10. The symptoms are aggravated by position and twisting. Pertinent negatives include no chest pain, fever, headaches, numbness or weakness. He has tried NSAIDs, acetaminophen, neck support, muscle relaxants and oral narcotics for the symptoms. The treatment provided mild relief.     PEG Assessment   What number best describes your pain on average in the past week?7  What number best describes how, during the past week, pain has interfered with your enjoyment of life?7  What number best describes how, during the past week, pain has interfered with your general activity?  7    Review of Pertinent Medical Data ---  MRI OF THE LUMBAR SPINE WITH AND WITHOUT CONTRAST     CLINICAL HISTORY: Degenerative disc disease. Bilateral leg pain. History  of L3-L4 discectomy.     TECHNIQUE: MRI of the lumbar spine was obtained with sagittal pre and  postgadolinium T1, proton-density, and T2-weighted images. Additionally,  there are axial pre and postgadolinium T1 and T2-weighted images through  the lumbar spine.     COMPARISON: Comparison is made to previous outside MRI of the lumbar  spine from [facility] and [facility] dated 01/09/2013.     FINDINGS:     The conus medullaris terminates at the T12-L1 level and has normal  signal intensity. The visualized distal thoracic spinal cord is  unremarkable.     At T12-L1, there is no significant canal or foraminal stenosis.     At L1-L2, there  are degenerative disc changes with adjacent degenerative  endplate marrow edema. Bulging disc material at L1-L2 results in a mild  degree of canal and foraminal narrowing. All of these changes are new  when compared to the prior exam.     At L2-L3, there is a disc bulge which mildly narrows the neural foramina  bilaterally. No significant canal stenosis is identified. The mild  foraminal narrowing is new when compared to the prior exam. A right  posterior lateral annular fissure is identified and new since the prior  study.     At the L3-L4 level, the patient has undergone an interval left  laminectomy and partial facetectomy procedure. There is a disc bulge  with a right posterior lateral annular fissure that is eccentric to the  left and results in a mild degree of canal narrowing, particularly along  the left side of the spinal canal. On the prior study, there was a left  central disc protrusion which is no longer evident. Bulging disc  material also results in mild foraminal narrowing. Although the left  central disc protrusion is no longer evident, the degree of canal  narrowing is mildly improved with resection of the left central disc  protrusion. The degree of foraminal narrowing is stable.     At L4-L5, there are degenerative disc changes with adjacent degenerative  endplate marrow edema. There is degenerative retrolisthesis of L4 on L5  by approximately 3 mm. There is a new posterior annular fissure at  L4-L5. Bulging disc material at L4-L5 results in a mild-to-moderate  degree of bilateral foraminal narrowing. Mild canal narrowing and left  lateral recess narrowing is appreciated secondary to a disc bulge. When  compared to the prior study, the degenerative disc changes and  degenerative endplate marrow edema is new. The mild degree of canal  narrowing and mild-to-moderate degree of foraminal narrowing is slightly  more pronounced when compared to the prior study.     At L5-S1, there is a disc osteophyte  complex which mildly narrows the  neural foramina and these findings are unchanged. A posterior annular  fissure is noted and is new since the prior study.     IMPRESSION:     When compared to the prior exam dated 01/09/2013, at L1-L2, there are  new degenerative disc changes with new adjacent degenerative endplate  marrow edema. Mild canal and foraminal narrowing at the L1-L2 level is  also new.     In the interval since the prior study, the patient has undergone a left  laminectomy and partial facetectomy procedure at the L3-L4 level. There  is a disc bulge eccentric to the left at L3-L4 which results in a mild  degree of canal and foraminal narrowing. The degree of canal narrowing  is mildly improved in the interval with interval resection of the  previously identified left central disc protrusion. The degree of mild  foraminal narrowing at L3-L4 is stable.     At L4-L5, there is progression of the degenerative disc change with new  areas of degenerative endplate marrow edema. Degenerative retrolisthesis  of L4 on L5 by approximately 3 mm is noted and is stable. A new  posterior annular fissure is seen at the L4-L5 level. Bulging disc  material results in a mild-to-moderate degree of bilateral foraminal  narrowing and a mild degree of canal narrowing which is more pronounced  when compared to the prior exam.     At L5-S1, a new posterior annular fissure is identified. There are  stable mild bilateral foraminal narrowing secondary to a disc osteophyte  complex.     The remaining degenerative phenomena within the lumbar spine are as  discussed in detail above.     This report was finalized on 7/6/2022 12:25 PM by Dr. Jose Harrell M.D.     The following portions of the patient's history were reviewed and updated as appropriate: allergies, current medications, past family history, past medical history, past social history, past surgical history, and problem list.    Review of Systems   Constitutional:  Positive for  "fatigue. Negative for activity change and fever.   Cardiovascular:  Negative for chest pain.   Gastrointestinal:  Negative for abdominal pain, constipation and diarrhea.   Genitourinary:  Negative for difficulty urinating and dysuria.   Musculoskeletal:  Positive for back pain and neck pain.   Neurological:  Negative for dizziness, weakness, light-headedness, numbness and headaches.   Psychiatric/Behavioral:  Positive for sleep disturbance. Negative for agitation and suicidal ideas.      I have reviewed and confirmed the accuracy of the ROS as documented by the MA/LPN/RN LUIS EDUARDO Leslie     Vitals:    12/01/23 0821   BP: 118/70   BP Location: Left arm   Patient Position: Sitting   Cuff Size: Large Adult   Pulse: 78   Resp: 18   Temp: 97.1 °F (36.2 °C)   SpO2: 96%   Weight: 97.5 kg (215 lb)   Height: 177.8 cm (70\")   PainSc:   6     Objective   Physical Exam  Constitutional:       Appearance: Normal appearance.   HENT:      Head: Normocephalic.   Cardiovascular:      Rate and Rhythm: Normal rate and regular rhythm.   Pulmonary:      Effort: Pulmonary effort is normal.      Breath sounds: Normal breath sounds.   Musculoskeletal:      Cervical back: Normal range of motion. Tenderness and bony tenderness present. No pain with movement.      Lumbar back: Tenderness (slight tenderness noted) and bony tenderness present. Decreased range of motion. Positive right straight leg raise test and positive left straight leg raise test.   Skin:     General: Skin is warm and dry.      Capillary Refill: Capillary refill takes less than 2 seconds.   Neurological:      General: No focal deficit present.      Mental Status: He is alert and oriented to person, place, and time.   Psychiatric:         Mood and Affect: Mood normal.         Speech: Speech normal.         Behavior: Behavior normal.         Thought Content: Thought content normal.         Cognition and Memory: Cognition normal.       Assessment & Plan   Diagnoses and " all orders for this visit:    1. DDD (degenerative disc disease), lumbar (Primary)  -     HYDROcodone-acetaminophen (NORCO)  MG per tablet; Take 1 tablet every 4-6 hours as needed for pain. Max 5/day. 30 day supply  Dispense: 150 tablet; Refill: 0    2. Displacement of cervical intervertebral disc without myelopathy  -     HYDROcodone-acetaminophen (NORCO)  MG per tablet; Take 1 tablet every 4-6 hours as needed for pain. Max 5/day. 30 day supply  Dispense: 150 tablet; Refill: 0    3. Lumbar radiculitis    4. Muscle spasm    5. Cervical radiculopathy    6. High risk medication use      Colby Alvarado reports a pain score of 6.  Given his pain assessment as noted, treatment options were discussed and the following options were decided upon as a follow-up plan to address the patient's pain: continuation of current treatment plan for pain and prescription for opiod analgesics.    --- Routine UDS in office today as part of monitoring requirements for controlled substances.  The specimen was viewed and the immunoassay result reviewed and is +OPI.  This specimen will be sent to [facility] for confirmation.     --- The patient signed an updated copy of the controlled substance agreement on 5/25/23  --- Continue Hydrocodone. Patient appears stable with current regimen. No adverse effects. Regarding continuation of opioids, there is no evidence of aberrant behavior or any red flags.  The patient continues with appropriate response to opioid therapy. ADL's remain intact by self.   --- Repeat interventional procedures as needed  --- Follow-up 2 months or sooner if needed     CARI REPORT  As part of the patient's treatment plan, I am prescribing controlled substances. The patient has been made aware of appropriate use of such medications, including potential risk of somnolence, limited ability to drive and/or work safely, and the potential for dependence or overdose. It has also been made clear that  these medications are for use by this patient only, without concomitant use of alcohol or other substances unless prescribed.     Patient has completed prescribing agreement detailing terms of continued prescribing of controlled substances, including monitoring CARI reports, urine drug screening, and pill counts if necessary. The patient is aware that inappropriate use will results in cessation of prescribing such medications.    As the clinician, I personally reviewed the CARI from 12/1/23 while the patient was in the office today.    History and physical exam exhibit continued safe and appropriate use of controlled substances.    Dictated utilizing Dragon dictation.

## 2023-12-04 ENCOUNTER — TELEPHONE (OUTPATIENT)
Dept: PAIN MEDICINE | Facility: CLINIC | Age: 58
End: 2023-12-04
Payer: COMMERCIAL

## 2023-12-04 DIAGNOSIS — M50.20 DISPLACEMENT OF CERVICAL INTERVERTEBRAL DISC WITHOUT MYELOPATHY: ICD-10-CM

## 2023-12-04 DIAGNOSIS — M51.36 DDD (DEGENERATIVE DISC DISEASE), LUMBAR: ICD-10-CM

## 2023-12-04 RX ORDER — HYDROCODONE BITARTRATE AND ACETAMINOPHEN 10; 325 MG/1; MG/1
TABLET ORAL
Qty: 150 TABLET | Refills: 0 | Status: SHIPPED | OUTPATIENT
Start: 2023-12-04

## 2023-12-04 NOTE — TELEPHONE ENCOUNTER
Provider: BRYAN HOFF    Caller: LYUDMILA DENNY    Relationship to Patient: SELF    Pharmacy:  [facility] [phone]    Phone Number: [phone]    Reason for Call: PATIENT SAYS HE LEFT A VOICE MAIL MESSAGE ON FRIDAY 12/1/23 ABOUT THE HYDROCODONE REFILL THAT WAS S[facility] BUT HAS NOT HEARD FROM ANYONE. PATIENT SAYS [facility] IS OUT OF THE RX AND PATIENT NEEDS THE HYDROCODONE SENT TO [facility] @ [phone] ASAP. PATIENT SAYS HE HAS BEEN OUT OF RX FOR 3 DAYS. PATIENT ALSO SAYS [facility] SAYS THE ARE RUNNING LOW ON RX AS WELL.    When was the patient last seen: 12/1/23

## 2024-01-03 ENCOUNTER — TELEPHONE (OUTPATIENT)
Dept: PAIN MEDICINE | Facility: CLINIC | Age: 59
End: 2024-01-03
Payer: COMMERCIAL

## 2024-01-03 DIAGNOSIS — M50.20 DISPLACEMENT OF CERVICAL INTERVERTEBRAL DISC WITHOUT MYELOPATHY: ICD-10-CM

## 2024-01-03 DIAGNOSIS — M51.36 DDD (DEGENERATIVE DISC DISEASE), LUMBAR: ICD-10-CM

## 2024-01-03 RX ORDER — HYDROCODONE BITARTRATE AND ACETAMINOPHEN 10; 325 MG/1; MG/1
TABLET ORAL
Qty: 150 TABLET | Refills: 0 | Status: SHIPPED | OUTPATIENT
Start: 2024-01-03

## 2024-01-03 NOTE — TELEPHONE ENCOUNTER
Refill for Shira  Last OV: 12/1/23  Next OV: none scheduled    Patient states he will run out of medication today.

## 2024-01-03 NOTE — TELEPHONE ENCOUNTER
"    Caller: Colby Alvarado \"NONA\"    Relationship: Self    Best call back number: [phone]    Requested Prescriptions: NORCO  MG  Requested Prescriptions      No prescriptions requested or ordered in this encounter        Pharmacy where request should be sent:  [facility] [phone]    Last office visit with prescribing clinician: 12/1/2023     Additional details provided by patient: PATIENT STATES DUE TO DECEMBER HAVING 31 DAYS, HE WILL RUN OUT OF MEDICATION TODAY.    Does the patient have less than a 3 day supply:  [x] Yes  [] No    Would you like a call back once the refill request has been completed: [x] Yes [] No      "

## 2024-01-03 NOTE — TELEPHONE ENCOUNTER
Reviewed last office visit on 12/1/2023. [facility]ST and CARI reviewed and are appropriate. Due to provider being out of office, will refill appropriately.    Fill date 1/4/2024 applied as appropriate per CARI.

## 2024-01-11 NOTE — PROGRESS NOTES
"Date of Office Visit: 2024  Encounter Provider: LUIS EDUARDO Frias  Place of Service: [facility]  Patient Name: Colby Alvarado  :1965    Chief complaint  Follow-up for chest pain, hypertension, paroxysmal atrial fibrillation    History of Present Illness  Patient is a 58 y.o. year old male  patient of Dr. Cedillo. Past medical history includes hypertension, hyperlipidemia, obstructive sleep apnea (on CPAP) and paroxysmal atrial fibrillation.  In 2019 he underwent pulmonary vein ablation Eliquis was continued for 3 months.  Patient had an echocardiogram at the same time that showed normal left ventricular systolic function with mild left ventricular hypertrophy \"abnormal systolic strain pattern\" was noted though civics were not provided.  On  he was seen by Dr. Duran for chest pain hypertension and sleep disorder following the ablation.  Chest pain resolved in 2019 after taking Carafate.  He was started on hydrochlorothiazide for hypertension and subsequently found to have obstructive sleep apnea.     Echocardiogram 2021 showed LVEF 55.9%, normal diastolic function, mild calcification of the aortic valve with no regurgitation or stenosis, trace mitral regurgitation, and normal right-sided pressures.  Exercise perfusion stress test was negative for ischemia.  Holter monitor showed predominant sinus rhythm with rare PACs and no evidence of sustained dysrhythmia.    Interval history  Patient presents today for follow-up of chest pain.  I will visit with him for the first time today and have reviewed his medical record.  Since last visit he is not using CPAP consistently.  He denies shortness of breath, edema, syncope or presyncope.  He does report that palpitations and chest pain are similar to prior and at his baseline.  Chest pain is described as a sharp, pinprick sensation that occurs at random and lasts a few seconds.  He can have " several episodes at a time and then none for several weeks.  Pain is not associated with any other symptoms.  He has not been checking blood pressure at the time of these episodes.  He drinks 5-6 12 oz Mt. Dews daily in addition to Red Bull, and admittedly does not drink much water in the wintertime.  He does feel fatigue is worsening. 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 Unfortunately has resumed smoking.    Past Medical History:   Diagnosis Date    Anxiety     Arthritis of back     Atrial fibrillation     Chronic pain     Depression     H/O cluster headache     Hearing loss     Hyperlipidemia     Hypertension     Insomnia     Periarthritis of shoulder     Seasonal allergies     Sleep apnea     using CPAP     Past Surgical History:   Procedure Laterality Date    BACK SURGERY      CARDIAC ABLATION  07/31/2019    CERVICAL EPIDURAL Bilateral 4/5/2022    Procedure: CERVICAL EPIDURAL;  Surgeon: Cruz Aguayo MD;  Location: [facility];  Service: Pain Management;  Laterality: Bilateral;    CERVICAL EPIDURAL N/A 5/19/2022    Procedure: CERVICAL EPIDURAL;  Surgeon: Cruz Aguayo MD;  Location: [facility];  Service: Pain Management;  Laterality: N/A;    CERVICAL EPIDURAL N/A 11/28/2023    Procedure: CERVICAL EPIDURAL STEROID INJECTION CPT: 55673;  Surgeon: Cruz Aguayo MD;  Location: [facility];  Service: Pain Management;  Laterality: N/A;    EPIDURAL Bilateral 8/23/2022    Procedure: L4 TRANSFORAMINAL LUMBAR EPIDURAL STEROID INJECTION;  Surgeon: Cruz Aguayo MD;  Location: [facility];  Service: Pain Management;  Laterality: Bilateral;    EYE MUSCLE SURGERY Left     HAND SURGERY Right     open fracture plate    HARDWARE REMOVAL Right     rt hand  plate    HERNIA REPAIR      MEDIAL BRANCH BLOCK Bilateral 11/10/2022    Procedure: MEDIAL BRANCH BLOCK LUMBAR bilateral L3-L5;  Surgeon: Cruz Aguayo MD;  Location: [facility];  Service: Pain  Management;  Laterality: Bilateral;    MEDIAL BRANCH BLOCK Bilateral 3/16/2023    Procedure: BILATEAL L3-L5 LUMBAR MEDIAL BRANCH BLOCK;  Surgeon: Cruz Aguayo MD;  [place] [facility];  Service: Pain Management;  Laterality: Bilateral;    NASAL FRACTURE SURGERY      RADIOFREQUENCY ABLATION Bilateral 5/11/2023    Procedure: BILATERAL L3-L5 RADIOFREQUENCY ABLATION LUMBAR CPT: 33864, 50226;  Surgeon: Cruz Aguyao MD;  [place]: [facility];  Service: Pain Management;  Laterality: Bilateral;    SHOULDER ARTHROSCOPY W/ ROTATOR CUFF REPAIR Right 3/13/2019    Procedure: SHOULDER ARTHROSCOPY, DEBRIDEIMENT OF LABRUM AND SUBSCAP, DECEOMPRESSION, DISTAL CLAVICLE EXCISION,  WITH MINI OPEN ROTATOR CUFF REPAIR;  Surgeon: Estela Townsend MD;  Location: [facility] OR [facility];  Service: Orthopedics    SHOULDER SURGERY      SPINE SURGERY      VASECTOMY       Outpatient Medications Prior to Visit   Medication Sig Dispense Refill    atorvastatin (LIPITOR) 80 MG tablet Take 1 tablet by mouth Every Night.      cetirizine (zyrTEC) 10 MG tablet Take 1 tablet by mouth Daily.      cyclobenzaprine (FLEXERIL) 10 MG tablet TAKE 1 TABLET BY MOUTH 2 TIMES A DAY AS NEEDED FOR MUSCLE SPASMS. 60 tablet 2    ezetimibe (ZETIA) 10 MG tablet Take 1 tablet by mouth every night at bedtime.      Galcanezumab-gnlm (Emgality, 300 MG Dose,) 100 MG/ML solution prefilled syringe As Needed.      HYDROcodone-acetaminophen (NORCO)  MG per tablet Take 1 tablet every 4-6 hours as needed for pain. Max 5/day. 30 day supply 150 tablet 0    lisinopril (PRINIVIL,ZESTRIL) 10 MG tablet Take 1 tablet by mouth 2 (two) times a day. 60 tablet 6    metoprolol succinate XL (TOPROL-XL) 100 MG 24 hr tablet TAKE 1 TABLET BY MOUTH DAILY  TAKE AN EXTRA 1/2 TABLET DAILY  AS NEEDED 135 tablet 0    SUMAtriptan Succinate (IMITREX) 6 MG/0.5ML injection sumatriptan 6 mg/0.5 mL subcutaneous pen injector      zolpidem (AMBIEN) 10 MG tablet Take 1 tablet by mouth At Night As  Needed for Sleep.       No facility-administered medications prior to visit.       Allergies as of 2024    (No Known Allergies)     Social History     Socioeconomic History    Marital status: Significant Other   Tobacco Use    Smoking status: Former     Packs/day: 1.50     Years: 30.00     Additional pack years: 0.00     Total pack years: 45.00     Types: Cigarettes     Quit date: 2021     Years since quittin.8    Smokeless tobacco: Former     Types: Snuff   Vaping Use    Vaping Use: Some days    Substances: Nicotine    Devices: Pre-filled or refillable cartridge   Substance and Sexual Activity    Alcohol use: Not Currently     Alcohol/week: 16.0 standard drinks of alcohol     Types: 16 Cans of beer per week     Comment: caffiene    Drug use: No    Sexual activity: Defer     Family History   Problem Relation Age of Onset    Hypertension Other     Heart disease Other     Lung disease Other     Emphysema Mother     Hypertension Mother     Heart disease Mother     Cancer Mother         Oral    Hypertension Sister     No Known Problems Brother     Heart attack Maternal Grandmother     Emphysema Maternal Grandmother     Heart disease Maternal Grandmother     Stroke Maternal Grandmother     Heart attack Maternal Grandfather     Emphysema Maternal Grandfather     Malig Hyperthermia Neg Hx      Review of Systems   Constitutional: Positive for malaise/fatigue.   Cardiovascular:  Positive for chest pain and palpitations. Negative for claudication, dyspnea on exertion, leg swelling, near-syncope, orthopnea, paroxysmal nocturnal dyspnea and syncope.   Respiratory:  Negative for shortness of breath.    Neurological:  Negative for brief paralysis, dizziness, headaches and light-headedness.   All other systems reviewed and are negative.       Objective:     Vitals:    24 0820   BP: 105/66   BP Location: Left arm   Patient Position: Sitting   Cuff Size: Large Adult   Pulse: 73   Weight: 92.4 kg (203 lb 9.6 oz)  "  Height: 177.8 cm (70\")     Body mass index is 29.21 kg/m².    Vitals reviewed.   Constitutional:       General: Not in acute distress.     Appearance: Well-developed and not in distress. Not diaphoretic.   HENT:      Head: Normocephalic.   Pulmonary:      Effort: Pulmonary effort is normal. No respiratory distress.      Breath sounds: Normal breath sounds. No wheezing. No rhonchi. No rales.   Cardiovascular:      Normal rate. Regular rhythm.      Murmurs: There is no murmur.   Pulses:     Radial: 2+ bilaterally.  Edema:     Peripheral edema absent.   Skin:     General: Skin is warm and dry. There is no cyanosis.      Findings: No rash.   Neurological:      Mental Status: Alert and oriented to person, place, and time.   Psychiatric:         Behavior: Behavior normal. Behavior is cooperative.         Thought Content: Thought content normal.         Judgment: Judgment normal.     Lab Review:     Lab Results   Component Value Date     08/26/2019     03/01/2019    K 3.8 08/26/2019    K 4.1 03/01/2019     08/26/2019     03/01/2019    CO2 23.0 08/26/2019    CO2 22.4 03/01/2019    BUN 12 08/26/2019    BUN 16 03/01/2019    CREATININE 0.89 08/26/2019    CREATININE 0.86 03/01/2019    EGFRIFNONA 90 08/26/2019    EGFRIFNONA 94 03/01/2019    GLUCOSE 106 (H) 08/26/2019    GLUCOSE 132 (H) 03/01/2019    CALCIUM 9.5 08/26/2019    CALCIUM 9.1 03/01/2019    ALBUMIN 4.90 08/26/2019    ALBUMIN 4.80 10/12/2016    BILITOT 0.7 08/26/2019    BILITOT 0.5 10/12/2016    AST 31 08/26/2019    AST 29 10/12/2016    ALT 40 08/26/2019    ALT 49 (H) 10/12/2016     Lab Results   Component Value Date    WBC 8.60 08/26/2019    WBC 8.94 03/01/2019    HGB 15.3 08/26/2019    HGB 15.4 03/01/2019    HCT 45.1 08/26/2019    HCT 45.9 03/01/2019    MCV 92.2 08/26/2019    MCV 95.4 03/01/2019     08/26/2019     03/01/2019     Lab Results   Component Value Date    PROBNP 59.7 08/26/2019     Lab Results   Component Value Date " "   TROPONINT <0.010 04/02/2021     No results found for: \"TSH\"          ECG 12 Lead    Date/Time: 1/12/2024 8:39 AM  Performed by: Demetra Cherry APRN    Authorized by: Demetra Cherry APRN  Comparison: compared with previous ECG   Similar to previous ECG  Rhythm: sinus rhythm  Rate: normal  BPM: 73  ST Elevation: II, III and aVF  QRS axis: normal  Comments: Similar to prior. No new ischemic changes      Assessment:       Diagnosis Plan   1. Precordial pain        2. PAF (paroxysmal atrial fibrillation)        3. Essential hypertension        4. Hyperlipemia, mixed        5. CAL (obstructive sleep apnea)        6. Former smoker        7. History of migraine          Plan:       1.  Chest pain.  Has multiple possible etiologies.  Has resolved in the past with use of Carafate.  Very atypical pain at this point.  He is also drinking a lot of caffeine and admittedly not much water.  He also stopped using CPAP \"a long time ago\" and has not followed up with sleep medicine. Will plan on follow-up in 3 months in our office.  If symptoms worsen he will call sooner.  If resuming CPAP and cutting back caffeine do not assist in controlling symptoms would recommend further coronary evaluation.  2.  Abnormal LV strain noted on prior echo.  Echo in 2021 with normal strain.  3.  Hypertension.  Controlled on current regimen  4.  Hyperlipidemia.  On high dose statin and zetia  5.  Paroxysmal atrial fibrillation, status post pulmonary vein ablation in 7/19.  No recurrence on prior monitor study  6.  Nicotine use.  Unfortunately resumed smoking but is trying to quit again.  I let him know that smoking can cause vasospasm as well as spikes in blood pressure that can make his symptoms worse.  7.  History migraine headaches, on sumatriptan  8.  Obstructive sleep apnea on CPAP.  He would like referral to a new sleep medicine provider.  I have placed the order for this.  He reports he got new machine but has not used this in many " months due to issues with equipment.  His wife notes that his snoring has worsened and she has witnessed him stop breathing in his sleep.  Discussed that untreated sleep apnea can cause worsening palpitations as well as significant fatigue.      Time Spent: I spent 30 minutes caring for Colby on this date of service. This time includes time spent by me in the following activities: preparing for the visit, reviewing tests, performing a medically appropriate examination and/or evaluations, counseling and educating the patient/family/caregiver, ordering medications, tests, or procedures, documenting information in the medical record, and independently interpreting results and communicating that information with the patient/family/caregiver.   I spent 1 minutes on the separately reported service of ECG. This time is not included in the time used to support the E/M service also reported today.        Your medication list            Accurate as of January 12, 2024  8:42 AM. If you have any questions, ask your nurse or doctor.                CONTINUE taking these medications        Instructions Last Dose Given Next Dose Due   atorvastatin 80 MG tablet  Commonly known as: LIPITOR      Take 1 tablet by mouth Every Night.       cetirizine 10 MG tablet  Commonly known as: zyrTEC      Take 1 tablet by mouth Daily.       cyclobenzaprine 10 MG tablet  Commonly known as: FLEXERIL      TAKE 1 TABLET BY MOUTH 2 TIMES A DAY AS NEEDED FOR MUSCLE SPASMS.       Emgality (300 MG Dose) 100 MG/ML solution prefilled syringe  Generic drug: Galcanezumab-gnlm      As Needed.       ezetimibe 10 MG tablet  Commonly known as: ZETIA      Take 1 tablet by mouth every night at bedtime.       HYDROcodone-acetaminophen  MG per tablet  Commonly known as: NORCO      Take 1 tablet every 4-6 hours as needed for pain. Max 5/day. 30 day supply       lisinopril 10 MG tablet  Commonly known as: PRINIVIL,ZESTRIL      Take 1 tablet by mouth 2 (two) times  a day.       metoprolol succinate  MG 24 hr tablet  Commonly known as: TOPROL-XL      TAKE 1 TABLET BY MOUTH DAILY  TAKE AN EXTRA 1/2 TABLET DAILY  AS NEEDED       SUMAtriptan Succinate 6 MG/0.5ML injection  Commonly known as: IMITREX      sumatriptan 6 mg/0.5 mL subcutaneous pen injector       zolpidem 10 MG tablet  Commonly known as: AMBIEN      Take 1 tablet by mouth At Night As Needed for Sleep.                Patient is no longer taking -.  I corrected the med list to reflect this.  I did not stop these medications.    No follow-ups on file.      Dictated utilizing Dragon dictation

## 2024-01-12 ENCOUNTER — OFFICE VISIT (OUTPATIENT)
Dept: CARDIOLOGY | Facility: CLINIC | Age: 59
End: 2024-01-12
Payer: COMMERCIAL

## 2024-01-12 VITALS
DIASTOLIC BLOOD PRESSURE: 66 MMHG | WEIGHT: 203.6 LBS | HEART RATE: 73 BPM | SYSTOLIC BLOOD PRESSURE: 105 MMHG | BODY MASS INDEX: 29.15 KG/M2 | HEIGHT: 70 IN

## 2024-01-12 DIAGNOSIS — Z86.69 HISTORY OF MIGRAINE: ICD-10-CM

## 2024-01-12 DIAGNOSIS — E78.2 HYPERLIPEMIA, MIXED: ICD-10-CM

## 2024-01-12 DIAGNOSIS — R07.2 PRECORDIAL PAIN: Primary | ICD-10-CM

## 2024-01-12 DIAGNOSIS — G47.33 OSA (OBSTRUCTIVE SLEEP APNEA): ICD-10-CM

## 2024-01-12 DIAGNOSIS — I10 ESSENTIAL HYPERTENSION: ICD-10-CM

## 2024-01-12 DIAGNOSIS — Z87.891 FORMER SMOKER: ICD-10-CM

## 2024-01-12 DIAGNOSIS — I48.0 PAF (PAROXYSMAL ATRIAL FIBRILLATION): ICD-10-CM

## 2024-01-12 PROCEDURE — 93000 ELECTROCARDIOGRAM COMPLETE: CPT | Performed by: NURSE PRACTITIONER

## 2024-01-12 PROCEDURE — 99214 OFFICE O/P EST MOD 30 MIN: CPT | Performed by: NURSE PRACTITIONER

## 2024-02-01 ENCOUNTER — HOSPITAL ENCOUNTER (OUTPATIENT)
Dept: GENERAL RADIOLOGY | Facility: HOSPITAL | Age: 59
Discharge: HOME | End: 2024-02-01
Payer: COMMERCIAL

## 2024-02-01 ENCOUNTER — OFFICE VISIT (OUTPATIENT)
Dept: PAIN MEDICINE | Facility: CLINIC | Age: 59
End: 2024-02-01
Payer: COMMERCIAL

## 2024-02-01 ENCOUNTER — LAB (OUTPATIENT)
Dept: LAB | Facility: HOSPITAL | Age: 59
End: 2024-02-01
Payer: COMMERCIAL

## 2024-02-01 VITALS
SYSTOLIC BLOOD PRESSURE: 121 MMHG | BODY MASS INDEX: 28.26 KG/M2 | OXYGEN SATURATION: 99 % | RESPIRATION RATE: 18 BRPM | HEIGHT: 70 IN | WEIGHT: 197.4 LBS | TEMPERATURE: 96.8 F | DIASTOLIC BLOOD PRESSURE: 70 MMHG | HEART RATE: 67 BPM

## 2024-02-01 DIAGNOSIS — M54.16 LUMBAR RADICULITIS: ICD-10-CM

## 2024-02-01 DIAGNOSIS — G89.29 CHRONIC LEFT SHOULDER PAIN: ICD-10-CM

## 2024-02-01 DIAGNOSIS — G89.29 CHRONIC LEFT SHOULDER PAIN: Primary | ICD-10-CM

## 2024-02-01 DIAGNOSIS — M25.512 CHRONIC LEFT SHOULDER PAIN: Primary | ICD-10-CM

## 2024-02-01 DIAGNOSIS — M54.12 CERVICAL RADICULOPATHY: ICD-10-CM

## 2024-02-01 DIAGNOSIS — Z79.1 NSAID LONG-TERM USE: ICD-10-CM

## 2024-02-01 DIAGNOSIS — M25.512 CHRONIC LEFT SHOULDER PAIN: ICD-10-CM

## 2024-02-01 DIAGNOSIS — M51.36 DDD (DEGENERATIVE DISC DISEASE), LUMBAR: ICD-10-CM

## 2024-02-01 DIAGNOSIS — M51.36 DDD (DEGENERATIVE DISC DISEASE), LUMBAR: Primary | ICD-10-CM

## 2024-02-01 DIAGNOSIS — Z79.899 HIGH RISK MEDICATION USE: ICD-10-CM

## 2024-02-01 DIAGNOSIS — M62.838 MUSCLE SPASM: ICD-10-CM

## 2024-02-01 DIAGNOSIS — M50.20 DISPLACEMENT OF CERVICAL INTERVERTEBRAL DISC WITHOUT MYELOPATHY: ICD-10-CM

## 2024-02-01 LAB
ALBUMIN SERPL-MCNC: 4.4 G/DL (ref 3.5–5.2)
ALBUMIN/GLOB SERPL: 1.9 G/DL
ALP SERPL-CCNC: 110 U/L (ref 39–117)
ALT SERPL W P-5'-P-CCNC: 52 U/L (ref 1–41)
ANION GAP SERPL CALCULATED.3IONS-SCNC: 12.7 MMOL/L (ref 5–15)
AST SERPL-CCNC: 26 U/L (ref 1–40)
BILIRUB SERPL-MCNC: 0.5 MG/DL (ref 0–1.2)
BUN SERPL-MCNC: 16 MG/DL (ref 6–20)
BUN/CREAT SERPL: 17.2 (ref 7–25)
CALCIUM SPEC-SCNC: 9.4 MG/DL (ref 8.6–10.5)
CHLORIDE SERPL-SCNC: 109 MMOL/L (ref 98–107)
CO2 SERPL-SCNC: 21.3 MMOL/L (ref 22–29)
CREAT SERPL-MCNC: 0.93 MG/DL (ref 0.76–1.27)
EGFRCR SERPLBLD CKD-EPI 2021: 96.4 ML/MIN/1.73
GLOBULIN UR ELPH-MCNC: 2.3 GM/DL
GLUCOSE SERPL-MCNC: 112 MG/DL (ref 65–99)
POTASSIUM SERPL-SCNC: 4.6 MMOL/L (ref 3.5–5.2)
PROT SERPL-MCNC: 6.7 G/DL (ref 6–8.5)
SODIUM SERPL-SCNC: 143 MMOL/L (ref 136–145)

## 2024-02-01 PROCEDURE — 99214 OFFICE O/P EST MOD 30 MIN: CPT

## 2024-02-01 PROCEDURE — 80053 COMPREHEN METABOLIC PANEL: CPT

## 2024-02-01 PROCEDURE — 36415 COLL VENOUS BLD VENIPUNCTURE: CPT

## 2024-02-01 PROCEDURE — 73030 X-RAY EXAM OF SHOULDER: CPT

## 2024-02-01 RX ORDER — TOPIRAMATE 50 MG/1
TABLET, FILM COATED ORAL
COMMUNITY

## 2024-02-01 RX ORDER — MELOXICAM 15 MG/1
15 TABLET ORAL DAILY
Qty: 30 TABLET | Refills: 0 | Status: SHIPPED | OUTPATIENT
Start: 2024-02-01

## 2024-02-01 RX ORDER — HYDROCODONE BITARTRATE AND ACETAMINOPHEN 10; 325 MG/1; MG/1
TABLET ORAL
Qty: 150 TABLET | Refills: 0 | Status: SHIPPED | OUTPATIENT
Start: 2024-02-01

## 2024-02-01 NOTE — PROGRESS NOTES
Also please let him know that his Flexeril had 2 refills so he should be able to request a refill from his pharmacy.

## 2024-02-01 NOTE — PROGRESS NOTES
CHIEF COMPLAINT  Back, neck, and left shoulder pain    Subjective   Colby Alvarado is a 58 y.o. male  who presents for follow-up.  He has a history of chronic neck and back pain. He reports that his back pain has remained consistent since his last office visit.      Today pain is 6/10VAS in severity. Pain is located in his neck and lower back. He reports that his neck pan is worse than his back pain. Pain intermittently radiates into bilateral upper extremities. Describes this pain as an intermittent aching and burning. Pain is worsened by certain movements, prolonged positions (standing), bending/twisting, and walking long distances. Pain improves with rest, reposition, and medication.      Continues with Hydrocodone 10mg 5/day. He also takes Flexeril PRN. Denies any side effects from the regimen, including constipation and somnolence. The regimen helps decrease pain by a moderate amount. He works as a  and this helps gets through the day. ADL's by self. Denies any bowel or bladder changes.      Procedures:  11/28/23 - C5-C6 SHAY - 60% ongoing relief  511/23 - Bilateral L3-L5 RFA - 50% ongoing relief  3/16/23 - Bilateral L3-L5 MBB - 80% relief x 24 hours  11/10/22 - bilateral L3-L5 MBB - 80% relief x a few hours  8/23/22 - bilateral L4 LTFESI - 90% relief of radicular symptoms - no relief of back pain  5/19/22 - SHAY - 50% ongoing relief  4/5/22 - SHAY - 80% relief x a couple of weeks     He has a history of lumbar spine surgery 15 years ago with Dr. Moon.     Back Pain  The current episode started more than 1 month ago. The problem occurs daily. The problem has been comes and goes since onset. The pain is present in the lumbar spine. The quality of the pain is described as aching and burning. The pain radiates to the left thigh and right thigh (bilateral hips, lateral legs). The pain is at a severity of 6/10. The symptoms are aggravated by lying down, standing and sitting (work, prolonged  position, lifting). Pertinent negatives include no abdominal pain, chest pain, dysuria, fever, headaches, numbness or weakness. He has tried NSAIDs and analgesics for the symptoms. The treatment provided mild relief.   Neck Pain   This is a chronic problem. The current episode started more than 1 year ago. The problem occurs daily. The problem has been gradually worsening. The pain is present in the left side, midline and right side. The quality of the pain is described as aching and burning. The pain is at a severity of 7/10 (worse today due to left shoulder pain). The symptoms are aggravated by position and twisting. Pertinent negatives include no chest pain, fever, headaches, numbness or weakness. He has tried NSAIDs, acetaminophen, neck support, muscle relaxants and oral narcotics for the symptoms. The treatment provided mild relief.      PEG Assessment   What number best describes your pain on average in the past week?7-8  What number best describes how, during the past week, pain has interfered with your enjoyment of life?6  What number best describes how, during the past week, pain has interfered with your general activity?  6    Review of Pertinent Medical Data ---  MRI OF THE LUMBAR SPINE WITH AND WITHOUT CONTRAST     CLINICAL HISTORY: Degenerative disc disease. Bilateral leg pain. History  of L3-L4 discectomy.     TECHNIQUE: MRI of the lumbar spine was obtained with sagittal pre and  postgadolinium T1, proton-density, and T2-weighted images. Additionally,  there are axial pre and postgadolinium T1 and T2-weighted images through  the lumbar spine.     COMPARISON: Comparison is made to previous outside MRI of the lumbar  spine from [facility] and [facility] dated 01/09/2013.     FINDINGS:     The conus medullaris terminates at the T12-L1 level and has normal  signal intensity. The visualized distal thoracic spinal cord is  unremarkable.     At T12-L1, there is no significant canal or foraminal stenosis.     At  L1-L2, there are degenerative disc changes with adjacent degenerative  endplate marrow edema. Bulging disc material at L1-L2 results in a mild  degree of canal and foraminal narrowing. All of these changes are new  when compared to the prior exam.     At L2-L3, there is a disc bulge which mildly narrows the neural foramina  bilaterally. No significant canal stenosis is identified. The mild  foraminal narrowing is new when compared to the prior exam. A right  posterior lateral annular fissure is identified and new since the prior  study.     At the L3-L4 level, the patient has undergone an interval left  laminectomy and partial facetectomy procedure. There is a disc bulge  with a right posterior lateral annular fissure that is eccentric to the  left and results in a mild degree of canal narrowing, particularly along  the left side of the spinal canal. On the prior study, there was a left  central disc protrusion which is no longer evident. Bulging disc  material also results in mild foraminal narrowing. Although the left  central disc protrusion is no longer evident, the degree of canal  narrowing is mildly improved with resection of the left central disc  protrusion. The degree of foraminal narrowing is stable.     At L4-L5, there are degenerative disc changes with adjacent degenerative  endplate marrow edema. There is degenerative retrolisthesis of L4 on L5  by approximately 3 mm. There is a new posterior annular fissure at  L4-L5. Bulging disc material at L4-L5 results in a mild-to-moderate  degree of bilateral foraminal narrowing. Mild canal narrowing and left  lateral recess narrowing is appreciated secondary to a disc bulge. When  compared to the prior study, the degenerative disc changes and  degenerative endplate marrow edema is new. The mild degree of canal  narrowing and mild-to-moderate degree of foraminal narrowing is slightly  more pronounced when compared to the prior study.     At L5-S1, there is a  disc osteophyte complex which mildly narrows the  neural foramina and these findings are unchanged. A posterior annular  fissure is noted and is new since the prior study.     IMPRESSION:     When compared to the prior exam dated 01/09/2013, at L1-L2, there are  new degenerative disc changes with new adjacent degenerative endplate  marrow edema. Mild canal and foraminal narrowing at the L1-L2 level is  also new.     In the interval since the prior study, the patient has undergone a left  laminectomy and partial facetectomy procedure at the L3-L4 level. There  is a disc bulge eccentric to the left at L3-L4 which results in a mild  degree of canal and foraminal narrowing. The degree of canal narrowing  is mildly improved in the interval with interval resection of the  previously identified left central disc protrusion. The degree of mild  foraminal narrowing at L3-L4 is stable.     At L4-L5, there is progression of the degenerative disc change with new  areas of degenerative endplate marrow edema. Degenerative retrolisthesis  of L4 on L5 by approximately 3 mm is noted and is stable. A new  posterior annular fissure is seen at the L4-L5 level. Bulging disc  material results in a mild-to-moderate degree of bilateral foraminal  narrowing and a mild degree of canal narrowing which is more pronounced  when compared to the prior exam.     At L5-S1, a new posterior annular fissure is identified. There are  stable mild bilateral foraminal narrowing secondary to a disc osteophyte  complex.     The remaining degenerative phenomena within the lumbar spine are as  discussed in detail above.     This report was finalized on 7/6/2022 12:25 PM by Dr. Jose Harrell M.D.     The following portions of the patient's history were reviewed and updated as appropriate: allergies, current medications, past family history, past medical history, past social history, past surgical history, and problem list.    Review of Systems  "  Constitutional:  Positive for activity change and fatigue. Negative for fever.   Cardiovascular:  Negative for chest pain.   Gastrointestinal:  Negative for abdominal pain, constipation and diarrhea.   Genitourinary:  Negative for difficulty urinating and dysuria.   Musculoskeletal:  Positive for back pain and neck pain.   Neurological:  Negative for dizziness, weakness, light-headedness, numbness and headaches.   Psychiatric/Behavioral:  Negative for agitation, sleep disturbance and suicidal ideas. The patient is not nervous/anxious.      I have reviewed and confirmed the accuracy of the ROS as documented by the MA/LPN/RN LUIS EDUARDO Leslie    Vitals:    02/01/24 0819   BP: 121/70   BP Location: Left arm   Patient Position: Sitting   Cuff Size: Large Adult   Pulse: 67   Resp: 18   Temp: 96.8 °F (36 °C)   SpO2: 99%   Weight: 89.5 kg (197 lb 6.4 oz)   Height: 177.8 cm (70\")   PainSc:   7     Objective   Physical Exam  Constitutional:       Appearance: Normal appearance.   HENT:      Head: Normocephalic.   Cardiovascular:      Rate and Rhythm: Normal rate and regular rhythm.   Pulmonary:      Effort: Pulmonary effort is normal.      Breath sounds: Normal breath sounds.   Musculoskeletal:      Left shoulder: Tenderness, bony tenderness and crepitus present. Decreased range of motion.      Cervical back: Normal range of motion. Tenderness and bony tenderness present. No pain with movement.      Lumbar back: Tenderness (slight tenderness noted) and bony tenderness present. Decreased range of motion. Positive right straight leg raise test and positive left straight leg raise test.   Skin:     General: Skin is warm and dry.      Capillary Refill: Capillary refill takes less than 2 seconds.   Neurological:      General: No focal deficit present.      Mental Status: He is alert and oriented to person, place, and time.   Psychiatric:         Mood and Affect: Mood normal.         Speech: Speech normal.         Behavior: " Behavior normal.         Thought Content: Thought content normal.         Cognition and Memory: Cognition normal.       Assessment & Plan   Diagnoses and all orders for this visit:    1. Chronic left shoulder pain (Primary)  -     XR Shoulder 2+ View Left; Future  -     Ambulatory Referral to Orthopedic Surgery    2. NSAID long-term use  -     Comprehensive Metabolic Panel; Future    3. DDD (degenerative disc disease), lumbar  -     HYDROcodone-acetaminophen (NORCO)  MG per tablet; Take 1 tablet every 4-6 hours as needed for pain. Max 5/day. 30 day supply  Dispense: 150 tablet; Refill: 0    4. Displacement of cervical intervertebral disc without myelopathy  -     HYDROcodone-acetaminophen (NORCO)  MG per tablet; Take 1 tablet every 4-6 hours as needed for pain. Max 5/day. 30 day supply  Dispense: 150 tablet; Refill: 0    5. Lumbar radiculitis    6. Muscle spasm    7. Cervical radiculopathy    8. High risk medication use      Colby Alvarado reports a pain score of 7.  Given his pain assessment as noted, treatment options were discussed and the following options were decided upon as a follow-up plan to address the patient's pain: continuation of current treatment plan for pain and prescription for opiod analgesics.    --- The urine drug screen confirmation from 12/1/23 has been reviewed and the result is appropriate based on patient history and CARI report  --- The patient signed an updated copy of the controlled substance agreement on 5/25/23  --- Continue Flexeril. No refill needed at this time.  --- Continue Hydrocodone. Ok to fill on 2/2/24 due to pharmacy supply. Will date March refill 3/4/24 to align refills.  --- CMP to assess renal function. If WNL, will prescribed NSAID to see if this is helpful for his arthritic pain.   --- Referral to Orthopedics to assess for left shoulder rotator cuff repair. Will obtain left shoulder x-ray today due to worsening pain.   --- Follow-up 2 months or sooner       CARI REPORT  As part of the patient's treatment plan, I am prescribing controlled substances. The patient has been made aware of appropriate use of such medications, including potential risk of somnolence, limited ability to drive and/or work safely, and the potential for dependence or overdose. It has also been made clear that these medications are for use by this patient only, without concomitant use of alcohol or other substances unless prescribed.     Patient has completed prescribing agreement detailing terms of continued prescribing of controlled substances, including monitoring CARI reports, urine drug screening, and pill counts if necessary. The patient is aware that inappropriate use will results in cessation of prescribing such medications.    As the clinician, I personally reviewed the CARI from 2/1/24 while the patient was in the office today.    History and physical exam exhibit continued safe and appropriate use of controlled substances.    Dictated utilizing Dragon dictation.

## 2024-02-01 NOTE — PROGRESS NOTES
Please let him know that his renal functions is WNL. I will send him a prescription for Meloxicam 15mg daily. He should not take any other NSAID medications with this. He should take medication with food as sometimes this medication can cause an upset stomach. Please let me know if he has any questions.

## 2024-02-02 NOTE — PROGRESS NOTES
Please let him know that his shoulder xray shows glenohumeral joint arthritis and mild bone growth over the AC joint. No fracture or dislocation noted. Helpfully the Meloxicam will give him so relief but I recommend he follow up with orthopedics as we dicussed.

## 2024-02-07 NOTE — PROGRESS NOTES
New Left Shoulder      Patient: Colby Alvarado        YOB: 1965    Medical Record Number: [id number]        Chief Complaints: Left shoulder pain      History of Present Illness: This is a 58-year-old right-hand-dominant male male who presents complaining of left shoulder pain I have fixed his rotator cuff on the right about 5 years ago the left one really started bothering him 2 to 3 months ago.  He is status post 2 rotator cuff repairs on that one many years ago 10 years ago by Dr. Marissa beard  he states that would not work for Dr. Low operated on him again about 9 years ago.  Current symptoms are moderate intermittent aching worse with activity somewhat better with rest he has pain with activity he has night pain his primary just started him on some meloxicam.  Past medical history is listed for those issues listed below he is on chronic pain medicine      Allergies: No Known Allergies    Medications:   Home Medications:  Current Outpatient Medications on File Prior to Visit   Medication Sig    atorvastatin (LIPITOR) 80 MG tablet Take 1 tablet by mouth Every Night.    cetirizine (zyrTEC) 10 MG tablet Take 1 tablet by mouth Daily.    cyclobenzaprine (FLEXERIL) 10 MG tablet TAKE 1 TABLET BY MOUTH 2 TIMES A DAY AS NEEDED FOR MUSCLE SPASMS.    ezetimibe (ZETIA) 10 MG tablet Take 1 tablet by mouth every night at bedtime.    Galcanezumab-gnlm (Emgality, 300 MG Dose,) 100 MG/ML solution prefilled syringe As Needed.    HYDROcodone-acetaminophen (NORCO)  MG per tablet Take 1 tablet every 4-6 hours as needed for pain. Max 5/day. 30 day supply    lisinopril (PRINIVIL,ZESTRIL) 10 MG tablet Take 1 tablet by mouth 2 (two) times a day.    meloxicam (MOBIC) 15 MG tablet Take 1 tablet by mouth Daily.    metoprolol succinate XL (TOPROL-XL) 100 MG 24 hr tablet TAKE 1 TABLET BY MOUTH DAILY  TAKE AN EXTRA 1/2 TABLET DAILY  AS NEEDED    SUMAtriptan Succinate (IMITREX) 6 MG/0.5ML injection  sumatriptan 6 mg/0.5 mL subcutaneous pen injector    topiramate (TOPAMAX) 50 MG tablet TAKE 1 TABLET (50MG) NIGHTLY FOR 1 WEEK, THEN TAKE TWICE DAILY    zolpidem (AMBIEN) 10 MG tablet Take 1 tablet by mouth At Night As Needed for Sleep.     No current facility-administered medications on file prior to visit.     Current Medications:  Scheduled Meds:  Continuous Infusions:No current facility-administered medications for this visit.    PRN Meds:.    Past Medical History:   Diagnosis Date    Anxiety     Arthritis of back     Atrial fibrillation     Chronic pain     Depression     H/O cluster headache     Hearing loss     Hyperlipidemia     Hypertension     Insomnia     Periarthritis of shoulder     Seasonal allergies     Sleep apnea     using CPAP        Past Surgical History:   Procedure Laterality Date    BACK SURGERY      CARDIAC ABLATION  07/31/2019    CERVICAL EPIDURAL Bilateral 4/5/2022    Procedure: CERVICAL EPIDURAL;  Surgeon: Cruz Aguayo MD;  Location: [facility];  Service: Pain Management;  Laterality: Bilateral;    CERVICAL EPIDURAL N/A 5/19/2022    Procedure: CERVICAL EPIDURAL;  Surgeon: Cruz Aguayo MD;  Location: [facility];  Service: Pain Management;  Laterality: N/A;    CERVICAL EPIDURAL N/A 11/28/2023    Procedure: CERVICAL EPIDURAL STEROID INJECTION CPT: 42923;  Surgeon: Cruz Aguayo MD;  Location: [facility];  Service: Pain Management;  Laterality: N/A;    EPIDURAL Bilateral 8/23/2022    Procedure: L4 TRANSFORAMINAL LUMBAR EPIDURAL STEROID INJECTION;  Surgeon: Cruz Aguayo MD;  Location: [facility];  Service: Pain Management;  Laterality: Bilateral;    EYE MUSCLE SURGERY Left     HAND SURGERY Right     open fracture plate    HARDWARE REMOVAL Right     rt hand  plate    HERNIA REPAIR      MEDIAL BRANCH BLOCK Bilateral 11/10/2022    Procedure: MEDIAL BRANCH BLOCK LUMBAR bilateral L3-L5;  Surgeon: Cruz Aguayo MD;  Location: [facility];  Service: Pain  Management;  Laterality: Bilateral;    MEDIAL BRANCH BLOCK Bilateral 3/16/2023    Procedure: BILATEAL L3-L5 LUMBAR MEDIAL BRANCH BLOCK;  Surgeon: Cruz Aguayo MD;  [place] [facility]  Service: Pain Management;  Laterality: Bilateral;    NASAL FRACTURE SURGERY      RADIOFREQUENCY ABLATION Bilateral 2023    Procedure: BILATERAL L3-L5 RADIOFREQUENCY ABLATION LUMBAR CPT: 57740, 76323;  Surgeon: Cruz Aguayo MD;  [place] [facility] [place];  Service: Pain Management;  Laterality: Bilateral;    SHOULDER ARTHROSCOPY W/ ROTATOR CUFF REPAIR Right 3/13/2019    Procedure: SHOULDER ARTHROSCOPY, DEBRIDEIMENT OF LABRUM AND SUBSCAP, DECEOMPRESSION, DISTAL CLAVICLE EXCISION,  WITH MINI OPEN ROTATOR CUFF REPAIR;  Surgeon: Estela Townsend MD;  Location: [facility];  Service: Orthopedics    SHOULDER SURGERY      SPINE SURGERY      VASECTOMY          Social History     Occupational History    Not on file   Tobacco Use    Smoking status: Former     Packs/day: 1.50     Years: 30.00     Additional pack years: 0.00     Total pack years: 45.00     Types: Cigarettes     Quit date: 2021     Years since quittin.9    Smokeless tobacco: Former     Types: Snuff   Vaping Use    Vaping Use: Some days    Substances: Nicotine    Devices: Pre-filled or refillable cartridge   Substance and Sexual Activity    Alcohol use: Not Currently     Alcohol/week: 16.0 standard drinks of alcohol     Types: 16 Cans of beer per week     Comment: caffiene    Drug use: No    Sexual activity: Defer      Social History     Social History Narrative    No caffeine        Family History   Problem Relation Age of Onset    Hypertension Other     Heart disease Other     Lung disease Other     Emphysema Mother     Hypertension Mother     Heart disease Mother     Cancer Mother         Oral    Hypertension Sister     No Known Problems Brother     Heart attack Maternal Grandmother     Emphysema Maternal Grandmother     Heart disease Maternal  Grandmother     Stroke Maternal Grandmother     Heart attack Maternal Grandfather     Emphysema Maternal Grandfather     Malig Hyperthermia Neg Hx              Review of Systems:     Review of Systems      Physical Exam: 58 y.o. male  General Appearance:    Alert, cooperative, in no acute distress                 There were no vitals filed for this visit.   Patient is alert and read ×3 no acute distress appears her above-listed at height weight and age.  Affect is normal respiratory rate is normal unlabored. Heart rate regular rate rhythm, sclera, dentition and hearing are normal for the purpose of this exam.    Ortho Exam  Physical exam of the left shoulder reveals no overlying skin changes no lymphedema no lymphadenopathy.  Patient has active flexion 180 with mild symptoms and some substitution abduction is similar external rotation is to 50 and internal rotation to the upper lumbar spine with mild symptoms.  Patient has good rotator cuff strength 4 over 5 with isometric strength testing with pain.  Patient has a positive impingement and a positive Cheney sign.  Patient has good cervical range of motion which is full and asymptomatic no radicular symptoms.  Patient has a normal elbow exam.  Good distal pulses are presentPatient has pain with overhead activity and a positive Neer sign and a positive empty can sign  They have a positive drop arm any definitive painful arc   Large Joint Arthrocentesis: L subacromial bursa  Date/Time: 2/9/2024 8:42 AM  Consent given by: patient  Site marked: site marked  Timeout: Immediately prior to procedure a time out was called to verify the correct patient, procedure, equipment, support staff and site/side marked as required   Supporting Documentation  Indications: pain   Procedure Details  Location: shoulder - L subacromial bursa  Preparation: Patient was prepped and draped in the usual sterile fashion  Needle gauge: 21G.  Approach: posterior  Medications administered: 80 mg  methylPREDNISolone acetate 80 MG/ML; 2 mL lidocaine PF 1% 1 %  Patient tolerance: patient tolerated the procedure well with no immediate complications            Radiology:   AP, internal rotation of the left shoulder were /reviewed to evauate shoulder pain.  I have no comparative films he has marked changes at the insertion of the rotator cuff suggestive of prior surgery has some acromioclavicular arthritis and a little bit of arthritis with small osteophyte seen in the inferior aspect of the head as best seen on the AP these are 2 views in epic I do not have an axillary lateral  Imaging Results (Most Recent)       None          Assessment/Plan: Left shoulder pain I suspect this is rotator cuff in some fashion talked about options I think is reasonable to inject this as a diagnostic and therapeutic tool he has done an abundant amount of physical therapy with 3 prior rotator cuff repairs he knows what to do he will get back on those I will see him back in 1 month if is not better we will get an MRI  Cortisone Injection. See procedure note.  Cortisone Injection for DIAGNOSTIC and THERAPUTIC purposes.

## 2024-02-09 ENCOUNTER — OFFICE VISIT (OUTPATIENT)
Dept: ORTHOPEDIC SURGERY | Facility: CLINIC | Age: 59
End: 2024-02-09
Payer: COMMERCIAL

## 2024-02-09 VITALS — HEIGHT: 70 IN | TEMPERATURE: 98.3 F | BODY MASS INDEX: 28.24 KG/M2 | WEIGHT: 197.3 LBS

## 2024-02-09 DIAGNOSIS — M75.102 TEAR OF LEFT ROTATOR CUFF, UNSPECIFIED TEAR EXTENT, UNSPECIFIED WHETHER TRAUMATIC: Primary | ICD-10-CM

## 2024-02-09 DIAGNOSIS — Z98.890 S/P ROTATOR CUFF REPAIR: ICD-10-CM

## 2024-02-09 RX ORDER — LIDOCAINE HYDROCHLORIDE 10 MG/ML
2 INJECTION, SOLUTION EPIDURAL; INFILTRATION; INTRACAUDAL; PERINEURAL
Status: COMPLETED | OUTPATIENT
Start: 2024-02-09 | End: 2024-02-09

## 2024-02-09 RX ORDER — METHYLPREDNISOLONE ACETATE 80 MG/ML
80 INJECTION, SUSPENSION INTRA-ARTICULAR; INTRALESIONAL; INTRAMUSCULAR; SOFT TISSUE
Status: COMPLETED | OUTPATIENT
Start: 2024-02-09 | End: 2024-02-09

## 2024-02-09 RX ADMIN — LIDOCAINE HYDROCHLORIDE 2 ML: 10 INJECTION, SOLUTION EPIDURAL; INFILTRATION; INTRACAUDAL; PERINEURAL at 08:42

## 2024-02-09 RX ADMIN — METHYLPREDNISOLONE ACETATE 80 MG: 80 INJECTION, SUSPENSION INTRA-ARTICULAR; INTRALESIONAL; INTRAMUSCULAR; SOFT TISSUE at 08:42

## 2024-02-29 ENCOUNTER — OFFICE VISIT (OUTPATIENT)
Dept: SLEEP MEDICINE | Facility: HOSPITAL | Age: 59
End: 2024-02-29
Payer: COMMERCIAL

## 2024-02-29 VITALS — BODY MASS INDEX: 29.03 KG/M2 | WEIGHT: 202.8 LBS | OXYGEN SATURATION: 97 % | HEIGHT: 70 IN | HEART RATE: 74 BPM

## 2024-02-29 DIAGNOSIS — G47.14 HYPERSOMNIA DUE TO MEDICAL CONDITION: ICD-10-CM

## 2024-02-29 DIAGNOSIS — G47.33 OSA (OBSTRUCTIVE SLEEP APNEA): Primary | ICD-10-CM

## 2024-02-29 DIAGNOSIS — G47.33 OSA ON CPAP: ICD-10-CM

## 2024-02-29 DIAGNOSIS — G47.36 HYPOXEMIA ASSOCIATED WITH SLEEP: ICD-10-CM

## 2024-02-29 PROCEDURE — G0463 HOSPITAL OUTPT CLINIC VISIT: HCPCS

## 2024-02-29 PROCEDURE — 99204 OFFICE O/P NEW MOD 45 MIN: CPT | Performed by: INTERNAL MEDICINE

## 2024-02-29 NOTE — PROGRESS NOTES
[facility] New Patient[facility]Consultation       Reason for Consultation: CAL    Patient Care Team:  Asif Simons MD as PCP - General (General Practice)  Gavin Matthews MD as Consulting Physician (Sleep Medicine)    Chief complaint: CAL    History of present illness:    Thank you for asking me to see your patient.  The patient is a 58 y.o. male who has known paroxysmal atrial fibrillation.  He also has hypertension.  Dr. Cedillo's note reviewed.  It appears the patient was last seen at [facility] concerning his obstructive sleep apnea 4/15/2021, weight 211 pounds.  In that note, home sleep study 11/23/2020, KALEIGH 6.4 with oxygen saturation mario of 85%.  Evaluation requested because the patient complained about snoring and not sleeping well as long as he can remember.  It appeared that the patient has not used his auto CPAP since October 2022.  Reevaluation requested.    The patient goes to bed at 10 PM gets out of bed at 5 AM.  He feels rested upon arising.  The patient works a 3-day, 2-day, 2-day, 3-day 12-hour shift between 7 AM and 7 PM.  He has a .  He reports losing 10 pounds in the last several years.  The patient has complaints of hypersomnolence and his Horatio Sleepiness Scale is abnormal at 17.  Without CPAP he snores and he has had reported witnessed apnea.  He has awaken gasping for breath.  He has a dry mouth in the morning.  He has nocturnal pain.  He does have problems falling asleep difficulty staying asleep and his sleep is generally restless.    Review of Systems:    A complete review of systems was done and all were negative with the exception of frequent urination, neck pain and fatigue    History:  Past Medical History:   Diagnosis Date    Anxiety     Arthritis of back     Atrial fibrillation     Chronic pain     Depression     H/O cluster headache     Hearing loss     Hyperlipidemia     Hypertension     Insomnia     CAL on CPAP 11/23/2020    Home sleep study.  KALEIGH 6.4  events per hour.  Low oxygen saturation 84%    Periarthritis of shoulder     Seasonal allergies    ,   Past Surgical History:   Procedure Laterality Date    BACK SURGERY      CARDIAC ABLATION  07/31/2019    CERVICAL EPIDURAL Bilateral 4/5/2022    Procedure: CERVICAL EPIDURAL;  Surgeon: Cruz Aguayo MD;  [facility];  Service: Pain Management;  Laterality: Bilateral;    CERVICAL EPIDURAL N/A 5/19/2022    Procedure: CERVICAL EPIDURAL;  Surgeon: Cruz Aguayo MD;  [facility];  Service: Pain Management;  Laterality: N/A;    CERVICAL EPIDURAL N/A 11/28/2023    Procedure: CERVICAL EPIDURAL STEROID INJECTION CPT: 27452;  Surgeon: Cruz Aguayo MD;  [facility];  Service: Pain Management;  Laterality: N/A;    EPIDURAL Bilateral 8/23/2022    Procedure: L4 TRANSFORAMINAL LUMBAR EPIDURAL STEROID INJECTION;  Surgeon: Cruz Aguayo MD;  [facility];  Service: Pain Management;  Laterality: Bilateral;    EYE MUSCLE SURGERY Left     HAND SURGERY Right     open fracture plate    HARDWARE REMOVAL Right     rt hand  plate    HERNIA REPAIR      MEDIAL BRANCH BLOCK Bilateral 11/10/2022    Procedure: MEDIAL BRANCH BLOCK LUMBAR bilateral L3-L5;  Surgeon: Cruz Aguayo MD;  [facility];  Service: Pain Management;  Laterality: Bilateral;    MEDIAL BRANCH BLOCK Bilateral 3/16/2023    Procedure: BILATEAL L3-L5 LUMBAR MEDIAL BRANCH BLOCK;  Surgeon: Cruz Aguayo MD;  [facility];  Service: Pain Management;  Laterality: Bilateral;    NASAL FRACTURE SURGERY      RADIOFREQUENCY ABLATION Bilateral 5/11/2023    Procedure: BILATERAL L3-L5 RADIOFREQUENCY ABLATION LUMBAR CPT: 06753, 39150;  Surgeon: Cruz Aguayo MD;  [facility];  Service: Pain Management;  Laterality: Bilateral;    SHOULDER ARTHROSCOPY W/ ROTATOR CUFF REPAIR Right 3/13/2019    Procedure: SHOULDER ARTHROSCOPY, DEBRIDEIMENT OF LABRUM AND SUBSCAP, DECEOMPRESSION,  "DISTAL CLAVICLE EXCISION,  WITH MINI OPEN ROTATOR CUFF REPAIR;  Surgeon: Estela Townsend MD;  [place] [facility] [place] [facility] [place]  Service: [place]opedics    SHOULDER SURGERY      SPINE SURGERY      VASECTOMY     ,   Family History   Problem Relation Age of Onset    Emphysema Mother     Hypertension Mother     Heart disease Mother     Cancer Mother         Oral    Hypertension Sister     No Known Problems Brother     Heart attack Maternal Grandmother     Emphysema Maternal Grandmother     Heart disease Maternal Grandmother     Stroke Maternal Grandmother     Heart attack Maternal Grandfather     Emphysema Maternal Grandfather     Sleep apnea Other     Hypertension Other     Heart disease Other     Lung disease Other     Malig Hyperthermia Neg Hx    , and   Social History     Socioeconomic History    Marital status:    Tobacco Use    Smoking status: Every Day     Current packs/day: 1.00     Average packs/day: 1 pack/day for 41.2 years (41.2 ttl pk-yrs)     Types: Cigarettes     Start date: 1983    Smokeless tobacco: Former     Types: Snuff   Vaping Use    Vaping status: Some Days    Substances: Nicotine    Devices: Pre-filled or refillable cartridge   Substance and Sexual Activity    Alcohol use: Not Currently     Alcohol/week: 16.0 standard drinks of alcohol     Types: 16 Cans of beer per week     Comment: caffiene    Drug use: No    Sexual activity: Defer     E-cigarette/Vaping    E-cigarette/Vaping Use Current Some Day User      E-cigarette/Vaping Substances    Nicotine Yes     THC No     CBD No     Flavoring No      E-cigarette/Vaping Devices    Disposable No     Pre-filled or Refillable Cartridge Yes     Refillable Tank No     Pre-filled Pod No      Allergies:  Patient has no known allergies.     Medication Review: His list was reviewed    Vital Signs:    Vitals:    02/29/24 1049   Pulse: 74   SpO2: 97%   Weight: 92 kg (202 lb 12.8 oz)   Height: 177.8 cm (70\")      Body mass index is 29.1 kg/m².         Physical " Exam:    Constitutional:  Well developed 58 y.o. male that appears in no apparent distress.  Awake & oriented times 3.  Normal mood with normal recent and remote memory and normal judgement.  Eyes:  Conjunctivae normal.  Oropharynx: Moist mucous membranes without exudate and a large tongue and class III Mallampati airway.    Neck: Trachea midline  Respiratory: Effort is not labored  Cardiovascular: Radial pulse regular  Musculoskeletal: Gait appears normal, no digital clubbing evident, no pre-tibial edema    Results Review: Downloads between 8/30 and 10/8/2022 compliance less than 50% and average usage 4 hours and 53 minutes and average AHI is normal without significant leak and average auto CPAP pressure is 15.7 and his DreamStation auto CPAP is 5-20    Impression:   Obstructive sleep apnea diagnosed by home sleep study 11/23/2020, with probable sleep-related hypoxia previously treated with auto CPAP with a F20 fullface mask.  Presently, the patient has symptoms and signs consistent with untreated obstructive sleep apnea because he is not using auto CPAP.  The patient has complaints of hypersomnolence.    It is noted that the patient takes Ambien and hydrocodone.  Both of these medicines at nighttime with untreated obstructive sleep apnea increases the severity of the obstructive sleep apnea and his risk.    Plan:  Good sleep hygiene measures should be maintained.  Weight loss would be beneficial in this patient who is nearly obese by Body mass index is 29.1 kg/m².    Pathophysiology of CAL described to the patient.  Cardiovascular complications of untreated CAL also reviewed.  I also described how untreated CAL can contribute to PAF as well as to hypertension.    After reviewing all with the patient, I would recommend changes to auto titrating CPAP to 8-16 cm water pressure.  The patient will continue with his medium F20 fullface mask.  The patient needs to obtain compliance and then a new DME can be selected and  all supplies ordered.    Additionally, reviewed Simi recall.  He has to check to see if his DreamStation auto CPAP is recalled and he should register his device.  I answered all of his questions.  I will see the patient back in 6 or 8 weeks.    Thank you for requesting me to assist in this patient's care.    Gavin Matthews MD  Sleep Medicine  02/29/24  11:00 EST

## 2024-03-01 DIAGNOSIS — G89.29 CHRONIC LEFT SHOULDER PAIN: ICD-10-CM

## 2024-03-01 DIAGNOSIS — M25.512 CHRONIC LEFT SHOULDER PAIN: ICD-10-CM

## 2024-03-01 DIAGNOSIS — M51.36 DDD (DEGENERATIVE DISC DISEASE), LUMBAR: ICD-10-CM

## 2024-03-01 RX ORDER — MELOXICAM 15 MG/1
15 TABLET ORAL DAILY
Qty: 30 TABLET | Refills: 1 | Status: SHIPPED | OUTPATIENT
Start: 2024-03-01

## 2024-03-04 ENCOUNTER — TELEPHONE (OUTPATIENT)
Dept: PAIN MEDICINE | Facility: CLINIC | Age: 59
End: 2024-03-04
Payer: COMMERCIAL

## 2024-03-04 DIAGNOSIS — M51.36 DDD (DEGENERATIVE DISC DISEASE), LUMBAR: ICD-10-CM

## 2024-03-04 DIAGNOSIS — M50.20 DISPLACEMENT OF CERVICAL INTERVERTEBRAL DISC WITHOUT MYELOPATHY: ICD-10-CM

## 2024-03-04 PROBLEM — G47.33 OSA ON CPAP: Status: ACTIVE | Noted: 2020-11-23

## 2024-03-04 PROBLEM — G47.14 HYPERSOMNIA DUE TO MEDICAL CONDITION: Status: ACTIVE | Noted: 2024-03-04

## 2024-03-04 PROBLEM — G47.36 HYPOXEMIA ASSOCIATED WITH SLEEP: Status: ACTIVE | Noted: 2024-03-04

## 2024-03-04 RX ORDER — HYDROCODONE BITARTRATE AND ACETAMINOPHEN 10; 325 MG/1; MG/1
TABLET ORAL
Qty: 150 TABLET | Refills: 0 | Status: SHIPPED | OUTPATIENT
Start: 2024-03-04

## 2024-03-04 NOTE — TELEPHONE ENCOUNTER
"Caller: Colby Alvarado \"NONA\"    Relationship: Self    Best call back number: [phone]    Requested Prescriptions:   Requested Prescriptions     Pending Prescriptions Disp Refills    HYDROcodone-acetaminophen (NORCO)  MG per tablet 150 tablet 0     Sig: Take 1 tablet every 4-6 hours as needed for pain. Max 5/day. 30 day supply        Pharmacy where request should be sent:  [facility]     Last office visit with prescribing clinician: 2/1/2024   Last telemedicine visit with prescribing clinician: Visit date not found   Next office visit with prescribing clinician: 4/1/2024     Additional details provided by patient: N/A     Does the patient have less than a 3 day supply:  [x] Yes  [] No    Would you like a call back once the refill request has been completed: [] Yes [x] No    If the office needs to give you a call back, can they leave a voicemail: [x] Yes [] No    Jak Ye Rep   03/04/24 08:43 EST         "

## 2024-04-01 ENCOUNTER — OFFICE VISIT (OUTPATIENT)
Dept: PAIN MEDICINE | Facility: CLINIC | Age: 59
End: 2024-04-01
Payer: COMMERCIAL

## 2024-04-01 VITALS
WEIGHT: 204.4 LBS | RESPIRATION RATE: 18 BRPM | HEART RATE: 62 BPM | SYSTOLIC BLOOD PRESSURE: 161 MMHG | DIASTOLIC BLOOD PRESSURE: 88 MMHG | OXYGEN SATURATION: 96 % | BODY MASS INDEX: 29.26 KG/M2 | TEMPERATURE: 97.3 F | HEIGHT: 70 IN

## 2024-04-01 DIAGNOSIS — M25.512 CHRONIC LEFT SHOULDER PAIN: ICD-10-CM

## 2024-04-01 DIAGNOSIS — M54.12 CERVICAL RADICULOPATHY: ICD-10-CM

## 2024-04-01 DIAGNOSIS — Z79.1 NSAID LONG-TERM USE: ICD-10-CM

## 2024-04-01 DIAGNOSIS — M51.36 DDD (DEGENERATIVE DISC DISEASE), LUMBAR: Primary | ICD-10-CM

## 2024-04-01 DIAGNOSIS — M62.838 MUSCLE SPASM: ICD-10-CM

## 2024-04-01 DIAGNOSIS — M54.16 LUMBAR RADICULITIS: ICD-10-CM

## 2024-04-01 DIAGNOSIS — M50.20 DISPLACEMENT OF CERVICAL INTERVERTEBRAL DISC WITHOUT MYELOPATHY: ICD-10-CM

## 2024-04-01 DIAGNOSIS — Z79.899 HIGH RISK MEDICATION USE: ICD-10-CM

## 2024-04-01 DIAGNOSIS — M25.551 RIGHT HIP PAIN: ICD-10-CM

## 2024-04-01 DIAGNOSIS — G89.29 CHRONIC LEFT SHOULDER PAIN: ICD-10-CM

## 2024-04-01 PROCEDURE — 99214 OFFICE O/P EST MOD 30 MIN: CPT

## 2024-04-01 RX ORDER — CYCLOBENZAPRINE HCL 10 MG
10 TABLET ORAL 2 TIMES DAILY PRN
Qty: 60 TABLET | Refills: 2 | Status: SHIPPED | OUTPATIENT
Start: 2024-04-01

## 2024-04-01 RX ORDER — HYDROCODONE BITARTRATE AND ACETAMINOPHEN 10; 325 MG/1; MG/1
TABLET ORAL
Qty: 150 TABLET | Refills: 0 | Status: SHIPPED | OUTPATIENT
Start: 2024-04-01

## 2024-04-01 NOTE — PROGRESS NOTES
CHIEF COMPLAINT  Back, neck, and left shoulder pain    Subjective   Colby Alvarado is a 58 y.o. male  who presents for follow-up.  He has a history of chronic neck and back pain. He reports that his pain level has fluctuated since his last office visit.     Today pain is 5/10VAS in severity. Pain is located in his neck and lower back. He reports that his neck pan is worse than his back pain. Pain intermittently radiates into bilateral upper extremities. Describes this pain as an intermittent aching and burning. Pain is worsened by certain movements, prolonged positions (standing), bending/twisting, and walking long distances. Pain improves with rest, reposition, and medication.      Continues with Hydrocodone 10mg 5/day. He also takes Flexeril PRN. He was started on Meloxicam at last office visit which he reports was helpful. Denies any side effects from the regimen, including constipation and somnolence. The regimen helps decrease pain by a moderate amount. He works as a  and this helps gets through the day. ADL's by self. Denies any bowel or bladder changes.     He saw Dr. Estela Townsend on 2/9/24 for left shoulder pain. Received steroid injection at this time.     Procedures:  11/28/23 - C5-C6 SHAY - 60% ongoing relief  511/23 - Bilateral L3-L5 RFA - 50% ongoing relief  3/16/23 - Bilateral L3-L5 MBB - 80% relief x 24 hours  11/10/22 - bilateral L3-L5 MBB - 80% relief x a few hours  8/23/22 - bilateral L4 LTFESI - 90% relief of radicular symptoms - no relief of back pain  5/19/22 - SHAY - 50% ongoing relief  4/5/22 - SHAY - 80% relief x a couple of weeks     He has a history of lumbar spine surgery 15 years ago with Dr. Moon.     Back Pain  The current episode started more than 1 month ago. The problem occurs daily. The problem has been comes and goes since onset. The pain is present in the lumbar spine. The quality of the pain is described as aching and burning. The pain radiates to the left  thigh and right thigh (bilateral hips, lateral legs). The pain is at a severity of 5/10. The symptoms are aggravated by lying down, standing and sitting (work, prolonged position, lifting). Pertinent negatives include no abdominal pain, chest pain, dysuria, fever, headaches, numbness or weakness. He has tried NSAIDs and analgesics for the symptoms. The treatment provided mild relief.   Neck Pain   This is a chronic problem. The current episode started more than 1 year ago. The problem occurs daily. The problem has been waxing and waning. The pain is present in the left side, midline and right side. The quality of the pain is described as aching and burning. The pain is at a severity of 5/10 (worse today due to left shoulder pain). The symptoms are aggravated by position and twisting. Pertinent negatives include no chest pain, fever, headaches, numbness or weakness. He has tried NSAIDs, acetaminophen, neck support, muscle relaxants and oral narcotics for the symptoms. The treatment provided mild relief.      PEG Assessment   What number best describes your pain on average in the past week?7  What number best describes how, during the past week, pain has interfered with your enjoyment of life?5  What number best describes how, during the past week, pain has interfered with your general activity?  7    Review of Pertinent Medical Data ---  MRI OF THE LUMBAR SPINE WITH AND WITHOUT CONTRAST     CLINICAL HISTORY: Degenerative disc disease. Bilateral leg pain. History  of L3-L4 discectomy.     TECHNIQUE: MRI of the lumbar spine was obtained with sagittal pre and  postgadolinium T1, proton-density, and T2-weighted images. Additionally,  there are axial pre and postgadolinium T1 and T2-weighted images through  the lumbar spine.     COMPARISON: Comparison is made to previous outside MRI of the lumbar  spine from [facility] dated 01/09/2013.     FINDINGS:     The conus medullaris terminates at the T12-L1 level and  has normal  signal intensity. The visualized distal thoracic spinal cord is  unremarkable.     At T12-L1, there is no significant canal or foraminal stenosis.     At L1-L2, there are degenerative disc changes with adjacent degenerative  endplate marrow edema. Bulging disc material at L1-L2 results in a mild  degree of canal and foraminal narrowing. All of these changes are new  when compared to the prior exam.     At L2-L3, there is a disc bulge which mildly narrows the neural foramina  bilaterally. No significant canal stenosis is identified. The mild  foraminal narrowing is new when compared to the prior exam. A right  posterior lateral annular fissure is identified and new since the prior  study.     At the L3-L4 level, the patient has undergone an interval left  laminectomy and partial facetectomy procedure. There is a disc bulge  with a right posterior lateral annular fissure that is eccentric to the  left and results in a mild degree of canal narrowing, particularly along  the left side of the spinal canal. On the prior study, there was a left  central disc protrusion which is no longer evident. Bulging disc  material also results in mild foraminal narrowing. Although the left  central disc protrusion is no longer evident, the degree of canal  narrowing is mildly improved with resection of the left central disc  protrusion. The degree of foraminal narrowing is stable.     At L4-L5, there are degenerative disc changes with adjacent degenerative  endplate marrow edema. There is degenerative retrolisthesis of L4 on L5  by approximately 3 mm. There is a new posterior annular fissure at  L4-L5. Bulging disc material at L4-L5 results in a mild-to-moderate  degree of bilateral foraminal narrowing. Mild canal narrowing and left  lateral recess narrowing is appreciated secondary to a disc bulge. When  compared to the prior study, the degenerative disc changes and  degenerative endplate marrow edema is new. The mild  degree of canal  narrowing and mild-to-moderate degree of foraminal narrowing is slightly  more pronounced when compared to the prior study.     At L5-S1, there is a disc osteophyte complex which mildly narrows the  neural foramina and these findings are unchanged. A posterior annular  fissure is noted and is new since the prior study.     IMPRESSION:     When compared to the prior exam dated 01/09/2013, at L1-L2, there are  new degenerative disc changes with new adjacent degenerative endplate  marrow edema. Mild canal and foraminal narrowing at the L1-L2 level is  also new.     In the interval since the prior study, the patient has undergone a left  laminectomy and partial facetectomy procedure at the L3-L4 level. There  is a disc bulge eccentric to the left at L3-L4 which results in a mild  degree of canal and foraminal narrowing. The degree of canal narrowing  is mildly improved in the interval with interval resection of the  previously identified left central disc protrusion. The degree of mild  foraminal narrowing at L3-L4 is stable.     At L4-L5, there is progression of the degenerative disc change with new  areas of degenerative endplate marrow edema. Degenerative retrolisthesis  of L4 on L5 by approximately 3 mm is noted and is stable. A new  posterior annular fissure is seen at the L4-L5 level. Bulging disc  material results in a mild-to-moderate degree of bilateral foraminal  narrowing and a mild degree of canal narrowing which is more pronounced  when compared to the prior exam.     At L5-S1, a new posterior annular fissure is identified. There are  stable mild bilateral foraminal narrowing secondary to a disc osteophyte  complex.     The remaining degenerative phenomena within the lumbar spine are as  discussed in detail above.     This report was finalized on 7/6/2022 12:25 PM by Dr. Jose Harrell M.D.       The following portions of the patient's history were reviewed and updated as appropriate:  "allergies, current medications, past family history, past medical history, past social history, past surgical history, and problem list.    Review of Systems   Constitutional:  Negative for activity change, fatigue and fever.   HENT:  Negative for congestion.    Respiratory:  Negative for cough, chest tightness and shortness of breath.    Cardiovascular:  Negative for chest pain.   Gastrointestinal:  Negative for abdominal pain, constipation and diarrhea.   Genitourinary:  Negative for dysuria.   Musculoskeletal:  Positive for back pain and neck pain.   Neurological:  Negative for dizziness, weakness, light-headedness, numbness and headaches.   Psychiatric/Behavioral:  Positive for sleep disturbance. Negative for agitation and suicidal ideas. The patient is not nervous/anxious.      I have reviewed and confirmed the accuracy of the ROS as documented by the MA/LPN/RN LUIS EDUARDO Leslie    Vitals:    04/01/24 0811   BP: 161/88   BP Location: Left arm   Patient Position: Sitting   Cuff Size: Large Adult   Pulse: 62   Resp: 18   Temp: 97.3 °F (36.3 °C)   TempSrc: Temporal   SpO2: 96%   Weight: 92.7 kg (204 lb 6.4 oz)   Height: 177.8 cm (70\")   PainSc:   5     Objective   Physical Exam  Constitutional:       Appearance: Normal appearance.   HENT:      Head: Normocephalic.   Cardiovascular:      Rate and Rhythm: Normal rate and regular rhythm.   Pulmonary:      Effort: Pulmonary effort is normal.      Breath sounds: Normal breath sounds.   Musculoskeletal:      Left shoulder: Tenderness, bony tenderness and crepitus present. Decreased range of motion.      Cervical back: Normal range of motion. Tenderness and bony tenderness present. No pain with movement.      Lumbar back: Tenderness (slight tenderness noted) and bony tenderness present. Decreased range of motion. Positive right straight leg raise test and positive left straight leg raise test.      Right hip: Tenderness and bony tenderness present.   Skin:     " General: Skin is warm and dry.      Capillary Refill: Capillary refill takes less than 2 seconds.   Neurological:      General: No focal deficit present.      Mental Status: He is alert and oriented to person, place, and time.   Psychiatric:         Mood and Affect: Mood normal.         Speech: Speech normal.         Behavior: Behavior normal.         Thought Content: Thought content normal.         Cognition and Memory: Cognition normal.       Assessment & Plan   Diagnoses and all orders for this visit:    1. DDD (degenerative disc disease), lumbar (Primary)  -     HYDROcodone-acetaminophen (NORCO)  MG per tablet; Take 1 tablet every 4-6 hours as needed for pain. Max 5/day. 30 day supply. DNF 4/3/24  Dispense: 150 tablet; Refill: 0    2. Displacement of cervical intervertebral disc without myelopathy  -     HYDROcodone-acetaminophen (NORCO)  MG per tablet; Take 1 tablet every 4-6 hours as needed for pain. Max 5/day. 30 day supply. DNF 4/3/24  Dispense: 150 tablet; Refill: 0    3. Chronic left shoulder pain    4. NSAID long-term use    5. Lumbar radiculitis    6. Muscle spasm  -     cyclobenzaprine (FLEXERIL) 10 MG tablet; Take 1 tablet by mouth 2 (Two) Times a Day As Needed for Muscle Spasms.  Dispense: 60 tablet; Refill: 2    7. Cervical radiculopathy    8. High risk medication use    9. Right hip pain  -     XR hip w or wo pelvis 2-3 view right; Future      Colby Bakerer reports a pain score of 5.  Given his pain assessment as noted, treatment options were discussed and the following options were decided upon as a follow-up plan to address the patient's pain: continuation of current treatment plan for pain and prescription for opiod analgesics.    --- The urine drug screen confirmation from 12/1/23 has been reviewed and the result is appropriate based on patient history and CARI report  --- The patient signed an updated copy of the controlled substance agreement on 5/25/23  --- Continue Flexeril.  Refill sent to pharmacy.   --- Continue Hydrocodone. DNF 4/3/24. Patient appears stable with current regimen. No adverse effects. Regarding continuation of opioids, there is no evidence of aberrant behavior or any red flags.  The patient continues with appropriate response to opioid therapy. ADL's remain intact by self.   --- X-ray right hip due to worsening pain   --- Follow-up 2 months or sooner      CARI REPORT  As part of the patient's treatment plan, I am prescribing controlled substances. The patient has been made aware of appropriate use of such medications, including potential risk of somnolence, limited ability to drive and/or work safely, and the potential for dependence or overdose. It has also been made clear that these medications are for use by this patient only, without concomitant use of alcohol or other substances unless prescribed.     Patient has completed prescribing agreement detailing terms of continued prescribing of controlled substances, including monitoring CARI reports, urine drug screening, and pill counts if necessary. The patient is aware that inappropriate use will results in cessation of prescribing such medications.    As the clinician, I personally reviewed the CARI from 4/1/24 while the patient was in the office today.    History and physical exam exhibit continued safe and appropriate use of controlled substances.    Dictated utilizing Dragon dictation.

## 2024-04-29 ENCOUNTER — HOSPITAL ENCOUNTER (OUTPATIENT)
Dept: GENERAL RADIOLOGY | Facility: HOSPITAL | Age: 59
Discharge: HOME | End: 2024-04-29
Payer: COMMERCIAL

## 2024-04-29 DIAGNOSIS — M25.551 RIGHT HIP PAIN: ICD-10-CM

## 2024-04-29 PROCEDURE — 73502 X-RAY EXAM HIP UNI 2-3 VIEWS: CPT

## 2024-04-29 NOTE — PROGRESS NOTES
Please let him know that his hip x-ray shows moderate arthritis, right side worse than left. We can discuss an intra-articular hip injection at his next office visit. He may also benefit from a referral to orthopedics for further evaluation.

## 2024-05-01 DIAGNOSIS — M50.20 DISPLACEMENT OF CERVICAL INTERVERTEBRAL DISC WITHOUT MYELOPATHY: ICD-10-CM

## 2024-05-01 DIAGNOSIS — M51.36 DDD (DEGENERATIVE DISC DISEASE), LUMBAR: ICD-10-CM

## 2024-05-01 RX ORDER — HYDROCODONE BITARTRATE AND ACETAMINOPHEN 10; 325 MG/1; MG/1
TABLET ORAL
Qty: 150 TABLET | Refills: 0 | Status: SHIPPED | OUTPATIENT
Start: 2024-05-01

## 2024-05-01 NOTE — TELEPHONE ENCOUNTER
Reviewed UDS and CARI. Both updated and appropriate. Refill appropriate.      Covering for Bertha HOFF

## 2024-05-01 NOTE — TELEPHONE ENCOUNTER
Caller: PATIENT    Relationship: SELF     Best call back number: [phone]    Requested Prescriptions     Pending Prescriptions Disp Refills    HYDROcodone-acetaminophen (NORCO)  MG per tablet 150 tablet 0     Sig: Take 1 tablet every 4-6 hours as needed for pain. Max 5/day. 30 day supply. DNF 4/3/24        Pharmacy where request should be sent: [facility] [address] [place] [facility] [place]     Last office visit with prescribing clinician: 4/1/2024   Last telemedicine visit with prescribing clinician: Visit date not found   Next office visit with prescribing clinician: 6/3/2024     Additional details provided by patient: PATIENT STATED HE HAS ENOUGH MEDICATION FOR TODAY AND TOMORROW.     Does the patient have less than a 3 day supply:  [x] Yes  [] No    Would you like a call back once the refill request has been completed: [] Yes [x] No    If the office needs to give you a call back, can they leave a voicemail: [] Yes [x] No

## 2024-05-23 ENCOUNTER — OFFICE VISIT (OUTPATIENT)
Dept: PAIN MEDICINE | Facility: CLINIC | Age: 59
End: 2024-05-23
Payer: COMMERCIAL

## 2024-05-23 VITALS
BODY MASS INDEX: 29.89 KG/M2 | OXYGEN SATURATION: 90 % | TEMPERATURE: 97.1 F | WEIGHT: 208.8 LBS | DIASTOLIC BLOOD PRESSURE: 74 MMHG | SYSTOLIC BLOOD PRESSURE: 125 MMHG | HEART RATE: 114 BPM | HEIGHT: 70 IN

## 2024-05-23 DIAGNOSIS — M54.16 LUMBAR RADICULITIS: ICD-10-CM

## 2024-05-23 DIAGNOSIS — M25.512 CHRONIC LEFT SHOULDER PAIN: ICD-10-CM

## 2024-05-23 DIAGNOSIS — M50.20 DISPLACEMENT OF CERVICAL INTERVERTEBRAL DISC WITHOUT MYELOPATHY: ICD-10-CM

## 2024-05-23 DIAGNOSIS — M51.36 DDD (DEGENERATIVE DISC DISEASE), LUMBAR: Primary | ICD-10-CM

## 2024-05-23 DIAGNOSIS — G89.29 CHRONIC LEFT SHOULDER PAIN: ICD-10-CM

## 2024-05-23 DIAGNOSIS — M54.12 CERVICAL RADICULOPATHY: ICD-10-CM

## 2024-05-23 DIAGNOSIS — Z79.899 HIGH RISK MEDICATION USE: ICD-10-CM

## 2024-05-23 DIAGNOSIS — M25.551 RIGHT HIP PAIN: ICD-10-CM

## 2024-05-23 RX ORDER — HYDROCODONE BITARTRATE AND ACETAMINOPHEN 10; 325 MG/1; MG/1
TABLET ORAL
Qty: 150 TABLET | Refills: 0 | Status: SHIPPED | OUTPATIENT
Start: 2024-05-23

## 2024-05-23 RX ORDER — MELOXICAM 15 MG/1
15 TABLET ORAL DAILY
Qty: 30 TABLET | Refills: 1 | Status: SHIPPED | OUTPATIENT
Start: 2024-05-23

## 2024-05-23 NOTE — PROGRESS NOTES
CHIEF COMPLAINT  Back and neck pain    Subjective   Colby Alvarado is a 58 y.o. male  who presents for follow-up.  He has a history of back and neck pain. He reports that his pain has remained consistent since his last office visit and varies based on activity level.     Today pain is 6/10VAS in severity. Pain is located in his neck, lower back, and bilateral hips. He reports that his neck pan is worse than his back pain. Pain intermittently radiates into bilateral upper extremities. Describes this pain as an intermittent aching and burning. Pain is worsened by certain movements, prolonged positions (standing), bending/twisting, and walking long distances. Pain improves with rest, reposition, and medication.      Continues with Hydrocodone 10mg 5/day. He also takes Flexeril PRN. He was started on Meloxicam at last office visit which he reports was helpful. Denies any side effects from the regimen, including constipation and somnolence. The regimen helps decrease pain by a moderate amount. He works as a  and this helps gets through the day. ADL's by self. Denies any bowel or bladder changes.      He saw Dr. Estela Townsend on 2/9/24 for left shoulder pain. Received steroid injection at this time.     Procedures:  11/28/23 - C5-C6 SHAY - 60% ongoing relief  511/23 - Bilateral L3-L5 RFA - 50% ongoing relief  3/16/23 - Bilateral L3-L5 MBB - 80% relief x 24 hours  11/10/22 - bilateral L3-L5 MBB - 80% relief x a few hours  8/23/22 - bilateral L4 LTFESI - 90% relief of radicular symptoms - no relief of back pain  5/19/22 - SHAY - 50% ongoing relief  4/5/22 - SHAY - 80% relief x a couple of weeks     He has a history of lumbar spine surgery 15 years ago with Dr. Moon.     Back Pain  The current episode started more than 1 month ago. The problem occurs daily. The problem is unchanged. The pain is present in the lumbar spine. The quality of the pain is described as aching and burning. The pain radiates to  the left thigh and right thigh (bilateral hips, lateral legs). The pain is at a severity of 6/10. The symptoms are aggravated by lying down, standing and sitting (work, prolonged position, lifting). Pertinent negatives include no abdominal pain, chest pain, dysuria, fever, headaches, numbness or weakness. He has tried NSAIDs and analgesics for the symptoms. The treatment provided mild relief.   Neck Pain   This is a chronic problem. The current episode started more than 1 year ago. The problem occurs daily. The problem has been unchanged. The pain is present in the left side, midline and right side. The quality of the pain is described as aching and burning. The pain is at a severity of 6/10 (worse today due to left shoulder pain). The symptoms are aggravated by position and twisting. Pertinent negatives include no chest pain, fever, headaches, numbness or weakness. He has tried NSAIDs, acetaminophen, neck support, muscle relaxants and oral narcotics for the symptoms. The treatment provided mild relief.      PEG Assessment   What number best describes your pain on average in the past week?8  What number best describes how, during the past week, pain has interfered with your enjoyment of life?6  What number best describes how, during the past week, pain has interfered with your general activity?  6    Review of Pertinent Medical Data ---  MRI OF THE LUMBAR SPINE WITH AND WITHOUT CONTRAST     CLINICAL HISTORY: Degenerative disc disease. Bilateral leg pain. History  of L3-L4 discectomy.     TECHNIQUE: MRI of the lumbar spine was obtained with sagittal pre and  postgadolinium T1, proton-density, and T2-weighted images. Additionally,  there are axial pre and postgadolinium T1 and T2-weighted images through  the lumbar spine.     COMPARISON: Comparison is made to previous outside MRI of the lumbar  spine from [facility] and [facility] dated 01/09/2013.     FINDINGS:     The conus medullaris terminates at the T12-L1 level and  has normal  signal intensity. The visualized distal thoracic spinal cord is  unremarkable.     At T12-L1, there is no significant canal or foraminal stenosis.     At L1-L2, there are degenerative disc changes with adjacent degenerative  endplate marrow edema. Bulging disc material at L1-L2 results in a mild  degree of canal and foraminal narrowing. All of these changes are new  when compared to the prior exam.     At L2-L3, there is a disc bulge which mildly narrows the neural foramina  bilaterally. No significant canal stenosis is identified. The mild  foraminal narrowing is new when compared to the prior exam. A right  posterior lateral annular fissure is identified and new since the prior  study.     At the L3-L4 level, the patient has undergone an interval left  laminectomy and partial facetectomy procedure. There is a disc bulge  with a right posterior lateral annular fissure that is eccentric to the  left and results in a mild degree of canal narrowing, particularly along  the left side of the spinal canal. On the prior study, there was a left  central disc protrusion which is no longer evident. Bulging disc  material also results in mild foraminal narrowing. Although the left  central disc protrusion is no longer evident, the degree of canal  narrowing is mildly improved with resection of the left central disc  protrusion. The degree of foraminal narrowing is stable.     At L4-L5, there are degenerative disc changes with adjacent degenerative  endplate marrow edema. There is degenerative retrolisthesis of L4 on L5  by approximately 3 mm. There is a new posterior annular fissure at  L4-L5. Bulging disc material at L4-L5 results in a mild-to-moderate  degree of bilateral foraminal narrowing. Mild canal narrowing and left  lateral recess narrowing is appreciated secondary to a disc bulge. When  compared to the prior study, the degenerative disc changes and  degenerative endplate marrow edema is new. The mild  degree of canal  narrowing and mild-to-moderate degree of foraminal narrowing is slightly  more pronounced when compared to the prior study.     At L5-S1, there is a disc osteophyte complex which mildly narrows the  neural foramina and these findings are unchanged. A posterior annular  fissure is noted and is new since the prior study.     IMPRESSION:     When compared to the prior exam dated 01/09/2013, at L1-L2, there are  new degenerative disc changes with new adjacent degenerative endplate  marrow edema. Mild canal and foraminal narrowing at the L1-L2 level is  also new.     In the interval since the prior study, the patient has undergone a left  laminectomy and partial facetectomy procedure at the L3-L4 level. There  is a disc bulge eccentric to the left at L3-L4 which results in a mild  degree of canal and foraminal narrowing. The degree of canal narrowing  is mildly improved in the interval with interval resection of the  previously identified left central disc protrusion. The degree of mild  foraminal narrowing at L3-L4 is stable.     At L4-L5, there is progression of the degenerative disc change with new  areas of degenerative endplate marrow edema. Degenerative retrolisthesis  of L4 on L5 by approximately 3 mm is noted and is stable. A new  posterior annular fissure is seen at the L4-L5 level. Bulging disc  material results in a mild-to-moderate degree of bilateral foraminal  narrowing and a mild degree of canal narrowing which is more pronounced  when compared to the prior exam.     At L5-S1, a new posterior annular fissure is identified. There are  stable mild bilateral foraminal narrowing secondary to a disc osteophyte  complex.     The remaining degenerative phenomena within the lumbar spine are as  discussed in detail above.     This report was finalized on 7/6/2022 12:25 PM by Dr. Jose Harrell M.D.      The following portions of the patient's history were reviewed and updated as appropriate:  "allergies, current medications, past family history, past medical history, past social history, past surgical history, and problem list.    Review of Systems   Constitutional:  Positive for fatigue. Negative for activity change and fever.   Cardiovascular:  Negative for chest pain.   Gastrointestinal:  Negative for abdominal pain, constipation and diarrhea.   Genitourinary:  Negative for difficulty urinating and dysuria.   Musculoskeletal:  Positive for back pain and neck pain.   Neurological:  Negative for dizziness, weakness, light-headedness, numbness and headaches.   Psychiatric/Behavioral:  Positive for sleep disturbance. Negative for agitation and suicidal ideas. The patient is not nervous/anxious.      I have reviewed and confirmed the accuracy of the ROS as documented by the MA/LPN/RN LUIS EDUARDO Leslie    Vitals:    05/23/24 1338   BP: 125/74   BP Location: Left arm   Patient Position: Sitting   Cuff Size: Large Adult   Pulse: 114   Temp: 97.1 °F (36.2 °C)   SpO2: 90%   Weight: 94.7 kg (208 lb 12.8 oz)   Height: 177.8 cm (70\")   PainSc:   6     Objective   Physical Exam  Constitutional:       Appearance: Normal appearance.   HENT:      Head: Normocephalic.   Cardiovascular:      Rate and Rhythm: Normal rate and regular rhythm.   Pulmonary:      Effort: Pulmonary effort is normal.      Breath sounds: Normal breath sounds.   Musculoskeletal:      Left shoulder: Tenderness, bony tenderness and crepitus present. Decreased range of motion.      Cervical back: Normal range of motion. Tenderness and bony tenderness present. No pain with movement.      Lumbar back: Tenderness (slight tenderness noted) and bony tenderness present. Decreased range of motion. Positive right straight leg raise test and positive left straight leg raise test.      Right hip: Tenderness and bony tenderness present.   Skin:     General: Skin is warm and dry.      Capillary Refill: Capillary refill takes less than 2 seconds. "   Neurological:      General: No focal deficit present.      Mental Status: He is alert and oriented to person, place, and time.   Psychiatric:         Mood and Affect: Mood normal.         Speech: Speech normal.         Behavior: Behavior normal.         Thought Content: Thought content normal.         Cognition and Memory: Cognition normal.       Assessment & Plan   Diagnoses and all orders for this visit:    1. DDD (degenerative disc disease), lumbar (Primary)  -     Urine Drug Screen Confirmation - Urine, Clean Catch; Future  -     POC Urine Drug Screen, Triage  -     meloxicam (MOBIC) 15 MG tablet; Take 1 tablet by mouth Daily.  Dispense: 30 tablet; Refill: 1  -     HYDROcodone-acetaminophen (NORCO)  MG per tablet; Take 1 tablet every 4-6 hours as needed for pain. Max 5/day. 30 day supply. DNF 6/2/24  Dispense: 150 tablet; Refill: 0    2. Displacement of cervical intervertebral disc without myelopathy  -     Urine Drug Screen Confirmation - Urine, Clean Catch; Future  -     POC Urine Drug Screen, Triage  -     HYDROcodone-acetaminophen (NORCO)  MG per tablet; Take 1 tablet every 4-6 hours as needed for pain. Max 5/day. 30 day supply. DNF 6/2/24  Dispense: 150 tablet; Refill: 0    3. Chronic left shoulder pain  -     Urine Drug Screen Confirmation - Urine, Clean Catch; Future  -     POC Urine Drug Screen, Triage  -     meloxicam (MOBIC) 15 MG tablet; Take 1 tablet by mouth Daily.  Dispense: 30 tablet; Refill: 1    4. Lumbar radiculitis  -     Urine Drug Screen Confirmation - Urine, Clean Catch; Future  -     POC Urine Drug Screen, Triage    5. High risk medication use  -     Urine Drug Screen Confirmation - Urine, Clean Catch; Future  -     POC Urine Drug Screen, Triage    6. Right hip pain  -     Urine Drug Screen Confirmation - Urine, Clean Catch; Future  -     POC Urine Drug Screen, Triage    7. Cervical radiculopathy  -     Urine Drug Screen Confirmation - Urine, Clean Catch; Future  -     POC  Urine Drug Screen, Triage      Colby Alvarado reports a pain score of 6.  Given his pain assessment as noted, treatment options were discussed and the following options were decided upon as a follow-up plan to address the patient's pain: educational materials on pain management and prescription for opiod analgesics.    --- Routine UDS in office today as part of monitoring requirements for controlled substances.  The specimen was viewed and the immunoassay result reviewed and is +OPI.  This specimen will be sent to [facility] laboratory for confirmation.     --- The patient signed an updated copy of the controlled substance agreement on 5/25/24  --- Continue Flexeril. No refill needed at this time.  --- Continue Meloxicam. Refill sent to pharmacy.   --- Continue Hydrocodone. DNF 6/2/24 Patient appears stable with current regimen. No adverse effects. Regarding continuation of opioids, there is no evidence of aberrant behavior or any red flags.  The patient continues with appropriate response to opioid therapy. ADL's remain intact by self.   --- Follow up with orthopedics in regards to worsening hip pain. May consider an intra-articular hip injection if pain were to worsen.   --- Follow-up 2 months or sooner      CARI REPORT  As part of the patient's treatment plan, I am prescribing controlled substances. The patient has been made aware of appropriate use of such medications, including potential risk of somnolence, limited ability to drive and/or work safely, and the potential for dependence or overdose. It has also been made clear that these medications are for use by this patient only, without concomitant use of alcohol or other substances unless prescribed.     Patient has completed prescribing agreement detailing terms of continued prescribing of controlled substances, including monitoring CARI reports, urine drug screening, and pill counts if necessary. The patient is aware that inappropriate use will  results in cessation of prescribing such medications.    As the clinician, I personally reviewed the CARI from 5/23/24 while the patient was in the office today.    History and physical exam exhibit continued safe and appropriate use of controlled substances.    Dictated utilizing Dragon dictation.

## 2024-06-28 DIAGNOSIS — M51.36 DDD (DEGENERATIVE DISC DISEASE), LUMBAR: ICD-10-CM

## 2024-06-28 DIAGNOSIS — M50.20 DISPLACEMENT OF CERVICAL INTERVERTEBRAL DISC WITHOUT MYELOPATHY: ICD-10-CM

## 2024-06-28 RX ORDER — HYDROCODONE BITARTRATE AND ACETAMINOPHEN 10; 325 MG/1; MG/1
TABLET ORAL
Qty: 150 TABLET | Refills: 0 | Status: SHIPPED | OUTPATIENT
Start: 2024-06-28

## 2024-06-28 NOTE — TELEPHONE ENCOUNTER
Caller: PATIENT    Relationship: SELF     Best call back number: [phone]    Requested Prescriptions     Pending Prescriptions Disp Refills    HYDROcodone-acetaminophen (NORCO)  MG per tablet 150 tablet 0     Sig: Take 1 tablet every 4-6 hours as needed for pain. Max 5/day. 30 day supply. DNF 6/2/24        Pharmacy where request should be sent: [facility] [place], [place]      Last office visit with prescribing clinician: 5/23/2024   Last telemedicine visit with prescribing clinician: Visit date not found   Next office visit with prescribing clinician: 7/26/2024     Additional details provided by patient: PATIENT WAS CALLING TO REQUEST A REFILL ON HIS MEDICATION. PATIENT STATED HE HAS A THREE DAY SUPPLY LEFT. THANK YOU!    Does the patient have less than a 3 day supply:  [] Yes  [x] No    Would you like a call back once the refill request has been completed: [] Yes [x] No    If the office needs to give you a call back, can they leave a voicemail: [] Yes [x] No

## 2024-07-09 RX ORDER — METOPROLOL SUCCINATE 100 MG/1
100 TABLET, EXTENDED RELEASE ORAL DAILY
Qty: 135 TABLET | Refills: 0 | Status: SHIPPED | OUTPATIENT
Start: 2024-07-09

## 2024-07-09 NOTE — TELEPHONE ENCOUNTER
Rx Refill Note  Requested Prescriptions     Pending Prescriptions Disp Refills    metoprolol succinate XL (TOPROL-XL) 100 MG 24 hr tablet 135 tablet 0     Sig: Take 1 tablet by mouth Daily. Patient to take an extra 1/2 tablet daily as needed      Last office visit with prescribing clinician: 1/12/2024   Last telemedicine visit with prescribing clinician: Visit date not found   Next office visit with prescribing clinician: Visit date not found                         Would you like a call back once the refill request has been completed: [] Yes [] No    If the office needs to give you a call back, can they leave a voicemail: [] Yes [] No    Maye Karimi CMA  07/09/24, 09:37 EDT

## 2024-07-17 DIAGNOSIS — G89.29 CHRONIC LEFT SHOULDER PAIN: ICD-10-CM

## 2024-07-17 DIAGNOSIS — M25.512 CHRONIC LEFT SHOULDER PAIN: ICD-10-CM

## 2024-07-17 DIAGNOSIS — M51.36 DDD (DEGENERATIVE DISC DISEASE), LUMBAR: ICD-10-CM

## 2024-07-17 DIAGNOSIS — M62.838 MUSCLE SPASM: ICD-10-CM

## 2024-07-18 RX ORDER — MELOXICAM 15 MG/1
15 TABLET ORAL DAILY
Qty: 30 TABLET | Refills: 1 | Status: SHIPPED | OUTPATIENT
Start: 2024-07-18

## 2024-07-18 RX ORDER — CYCLOBENZAPRINE HCL 10 MG
10 TABLET ORAL 2 TIMES DAILY PRN
Qty: 60 TABLET | Refills: 2 | Status: SHIPPED | OUTPATIENT
Start: 2024-07-18

## 2024-07-26 ENCOUNTER — PREP FOR SURGERY (OUTPATIENT)
Dept: SURGERY | Facility: SURGERY CENTER | Age: 59
End: 2024-07-26
Payer: COMMERCIAL

## 2024-07-26 ENCOUNTER — OFFICE VISIT (OUTPATIENT)
Dept: PAIN MEDICINE | Facility: CLINIC | Age: 59
End: 2024-07-26
Payer: COMMERCIAL

## 2024-07-26 VITALS
SYSTOLIC BLOOD PRESSURE: 158 MMHG | HEART RATE: 74 BPM | RESPIRATION RATE: 12 BRPM | DIASTOLIC BLOOD PRESSURE: 88 MMHG | BODY MASS INDEX: 29.75 KG/M2 | OXYGEN SATURATION: 98 % | HEIGHT: 70 IN | TEMPERATURE: 96.8 F | WEIGHT: 207.8 LBS

## 2024-07-26 DIAGNOSIS — M54.16 LUMBAR RADICULITIS: ICD-10-CM

## 2024-07-26 DIAGNOSIS — M25.511 CHRONIC RIGHT SHOULDER PAIN: Primary | ICD-10-CM

## 2024-07-26 DIAGNOSIS — M51.36 DDD (DEGENERATIVE DISC DISEASE), LUMBAR: ICD-10-CM

## 2024-07-26 DIAGNOSIS — G89.29 CHRONIC LEFT SHOULDER PAIN: ICD-10-CM

## 2024-07-26 DIAGNOSIS — Z79.899 HIGH RISK MEDICATION USE: ICD-10-CM

## 2024-07-26 DIAGNOSIS — M54.12 CERVICAL RADICULOPATHY: ICD-10-CM

## 2024-07-26 DIAGNOSIS — M62.838 MUSCLE SPASM: ICD-10-CM

## 2024-07-26 DIAGNOSIS — M50.20 DISPLACEMENT OF CERVICAL INTERVERTEBRAL DISC WITHOUT MYELOPATHY: ICD-10-CM

## 2024-07-26 DIAGNOSIS — G89.29 CHRONIC RIGHT SHOULDER PAIN: Primary | ICD-10-CM

## 2024-07-26 DIAGNOSIS — M25.512 CHRONIC LEFT SHOULDER PAIN: ICD-10-CM

## 2024-07-26 PROCEDURE — 99214 OFFICE O/P EST MOD 30 MIN: CPT

## 2024-07-26 RX ORDER — HYDROCODONE BITARTRATE AND ACETAMINOPHEN 10; 325 MG/1; MG/1
TABLET ORAL
Qty: 150 TABLET | Refills: 0 | Status: SHIPPED | OUTPATIENT
Start: 2024-07-26

## 2024-07-26 NOTE — PROGRESS NOTES
CHIEF COMPLAINT  Neck, back, and shoulder pain    Subjective   Colby Alvarado is a 58 y.o. male  who presents for follow-up.  He has a history of back and neck pain. He complains today of worsening right shoulder pain.       Today pain is 7/10VAS in severity. Pain is located in his neck, lower back, and bilateral hips. He reports that his neck pan is worse than his back pain. Pain intermittently radiates into bilateral shoulders, right worse than left. Describes this pain as an intermittent aching and burning. Pain is worsened by certain movements, prolonged positions (standing), bending/twisting, and walking long distances. Pain improves with rest, reposition, and medication.      Continues with Hydrocodone 10mg 5/day. He also takes Flexeril PRN. He was started on Meloxicam at last office visit which he reports was helpful. Denies any side effects from the regimen, including constipation and somnolence. The regimen helps decrease pain by a moderate amount. He works as a  and this helps gets through the day. ADL's by self. Denies any bowel or bladder changes.      He saw Dr. Estela Townsend on 2/9/24 for left shoulder pain. Received steroid injection at this time. History of bilateral rotator cuff tears with surgical repair.  Left shoulder pain has somewhat improved since his steroid injection but he complains today of worsening right shoulder pain that is waking him up at night.     Procedures:  11/28/23 - C5-C6 SHAY - 60% ongoing relief  511/23 - Bilateral L3-L5 RFA - 50% ongoing relief  3/16/23 - Bilateral L3-L5 MBB - 80% relief x 24 hours  11/10/22 - bilateral L3-L5 MBB - 80% relief x a few hours  8/23/22 - bilateral L4 LTFESI - 90% relief of radicular symptoms - no relief of back pain  5/19/22 - SHAY - 50% ongoing relief  4/5/22 - SHAY - 80% relief x a couple of weeks     He has a history of lumbar spine surgery 15 years ago with Dr. Moon.     Back Pain  The current episode started more  than 1 month ago. The problem occurs daily. The problem has been comes and goes since onset. The pain is present in the lumbar spine. The quality of the pain is described as aching and burning. The pain radiates to the left thigh and right thigh (bilateral hips, lateral legs). The pain is at a severity of 6/10. The symptoms are aggravated by lying down, standing and sitting (work, prolonged position, lifting). Pertinent negatives include no abdominal pain, chest pain, dysuria, fever, headaches, numbness or weakness. He has tried NSAIDs and analgesics for the symptoms. The treatment provided mild relief.   Neck Pain   This is a chronic problem. The current episode started more than 1 year ago. The problem occurs daily. The problem has been unchanged. The pain is present in the left side, midline and right side. The quality of the pain is described as aching and burning. The pain is at a severity of 7/10 (worse today due to right shoulder pain). The symptoms are aggravated by position and twisting. Pertinent negatives include no chest pain, fever, headaches, numbness or weakness. He has tried NSAIDs, acetaminophen, neck support, muscle relaxants and oral narcotics for the symptoms. The treatment provided mild relief.     PEG Assessment   What number best describes your pain on average in the past week?6  What number best describes how, during the past week, pain has interfered with your enjoyment of life?7  What number best describes how, during the past week, pain has interfered with your general activity?  7    Review of Pertinent Medical Data ---        The following portions of the patient's history were reviewed and updated as appropriate: allergies, current medications, past family history, past medical history, past social history, past surgical history, and problem list.    Review of Systems   Constitutional:  Negative for activity change (less), fatigue and fever.   Respiratory:  Negative for cough and chest  "tightness.    Cardiovascular:  Negative for chest pain.   Gastrointestinal:  Negative for abdominal pain, constipation and diarrhea.   Genitourinary:  Negative for difficulty urinating and dysuria.   Musculoskeletal:  Positive for back pain and neck pain.   Neurological:  Negative for dizziness, weakness, light-headedness, numbness and headaches.   Psychiatric/Behavioral:  Positive for sleep disturbance. Negative for agitation and suicidal ideas. The patient is not nervous/anxious.        I have reviewed and confirmed the accuracy of the ROS as documented by the MA/LPN/RN LUIS EDUARDO Leslie    Vitals:    07/26/24 0755   BP: 158/88   BP Location: Right arm   Patient Position: Sitting   Cuff Size: Adult   Pulse: 74   Resp: 12   Temp: 96.8 °F (36 °C)   TempSrc: Temporal   SpO2: 98%   Weight: 94.3 kg (207 lb 12.8 oz)   Height: 177.8 cm (70\")   PainSc:   7     Objective   Physical Exam  Constitutional:       Appearance: Normal appearance.   HENT:      Head: Normocephalic.   Cardiovascular:      Rate and Rhythm: Normal rate and regular rhythm.   Pulmonary:      Effort: Pulmonary effort is normal.      Breath sounds: Normal breath sounds.   Musculoskeletal:      Right shoulder: Tenderness and bony tenderness present. Decreased range of motion.      Left shoulder: Tenderness, bony tenderness and crepitus present. Decreased range of motion.      Cervical back: Normal range of motion. Tenderness and bony tenderness present. No pain with movement.      Lumbar back: Tenderness (slight tenderness noted) and bony tenderness present. Decreased range of motion. Positive right straight leg raise test and positive left straight leg raise test.        Back:       Right hip: Tenderness and bony tenderness present.   Skin:     General: Skin is warm and dry.      Capillary Refill: Capillary refill takes less than 2 seconds.   Neurological:      General: No focal deficit present.      Mental Status: He is alert and oriented to person, " place, and time.   Psychiatric:         Mood and Affect: Mood normal.         Speech: Speech normal.         Behavior: Behavior normal.         Thought Content: Thought content normal.         Cognition and Memory: Cognition normal.       Assessment & Plan   Diagnoses and all orders for this visit:    1. Chronic right shoulder pain (Primary)    2. Chronic left shoulder pain    3. Cervical radiculopathy    4. DDD (degenerative disc disease), lumbar  -     HYDROcodone-acetaminophen (NORCO)  MG per tablet; Take 1 tablet every 4-6 hours as needed for pain. Max 5/day. 30 day supply. DNF 8/1/24  Dispense: 150 tablet; Refill: 0    5. Displacement of cervical intervertebral disc without myelopathy  -     HYDROcodone-acetaminophen (NORCO)  MG per tablet; Take 1 tablet every 4-6 hours as needed for pain. Max 5/day. 30 day supply. DNF 8/1/24  Dispense: 150 tablet; Refill: 0    6. Lumbar radiculitis    7. Muscle spasm    8. High risk medication use      Colby MURALI Christiano reports a pain score of 7.  Given his pain assessment as noted, treatment options were discussed and the following options were decided upon as a follow-up plan to address the patient's pain: continuation of current treatment plan for pain and prescription for opiod analgesics.    --- Right Suprascapular Nerve Block  Reviewed the procedure at length with the patient.  Included in the review was expectations, complications, risk and benefits.The procedure was described in detail and the risks, benefits and alternatives were discussed with the patient (including but not limited to: bleeding, infection, nerve damage, worsening of pain, inability to perform injection, paralysis, seizures, coma, no pain relief and death) who agreed to proceed.  Discussed the potential for sedation if warranted/wanted.  The procedure will plan to be performed at [facility] with fluoroscopic guidance(unless ultrasound is indicated) and could  potentially have steroids and contrast dye used. Questions were answered and in a way the patient could understand.  Patient verbalized understanding and wishes to proceed.  This intervention will be ordered.  Discussed with patient that all procedures are part of a multimodal plan of care and include either formal PT or a home exercise program.  Patient has no evidence of coagulopathy or current infection.     --- The urine drug screen confirmation from 5/23/24 has been reviewed and the result is appropriate based on patient history and CARI report  --- The patient signed an updated copy of the controlled substance agreement on 5/25/24  --- Continue Flexeril and Meloxicam. No refill needed at this time.  --- Continue Hydrocodone. DNF 8/1/24. Patient appears stable with current regimen. No adverse effects. Regarding continuation of opioids, there is no evidence of aberrant behavior or any red flags.  The patient continues with appropriate response to opioid therapy. ADL's remain intact by self.   --- Follow-up 2 months for medication management and for procedure     CARI REPORT  As part of the patient's treatment plan, I am prescribing controlled substances. The patient has been made aware of appropriate use of such medications, including potential risk of somnolence, limited ability to drive and/or work safely, and the potential for dependence or overdose. It has also been made clear that these medications are for use by this patient only, without concomitant use of alcohol or other substances unless prescribed.     Patient has completed prescribing agreement detailing terms of continued prescribing of controlled substances, including monitoring CARI reports, urine drug screening, and pill counts if necessary. The patient is aware that inappropriate use will results in cessation of prescribing such medications.    As the clinician, I personally reviewed the CARI from 7/26/24 while the patient was in the  office today.    History and physical exam exhibit continued safe and appropriate use of controlled substances.    Dictated utilizing Dragon dictation.

## 2024-07-26 NOTE — PATIENT INSTRUCTIONS
Reviewed the procedure at length with the patient.  Included in the review was expectations, complications, risk and benefits.The procedure was described in detail and the risks, benefits and alternatives were discussed with the patient (including but not limited to: bleeding, infection, nerve damage, worsening of pain, inability to perform injection, paralysis, seizures, coma, no pain relief and death) who agreed to proceed.  Discussed the potential for sedation if warranted/wanted.  The procedure will plan to be performed at [facility] with fluoroscopic guidance(unless ultrasound is indicated) and could potentially have steroids and contrast dye used. Questions were answered and in a way the patient could understand.  Patient verbalized understanding and wishes to proceed.  This intervention will be ordered.  Discussed with patient that all procedures are part of a multimodal plan of care and include either formal PT or a home exercise program.  Patient has no evidence of coagulopathy or current infection.

## 2024-07-29 ENCOUNTER — TRANSCRIBE ORDERS (OUTPATIENT)
Dept: SURGERY | Facility: SURGERY CENTER | Age: 59
End: 2024-07-29
Payer: COMMERCIAL

## 2024-07-29 DIAGNOSIS — Z41.9 SURGERY, ELECTIVE: Primary | ICD-10-CM

## 2024-07-29 PROBLEM — M25.511 CHRONIC RIGHT SHOULDER PAIN: Status: ACTIVE | Noted: 2024-07-26

## 2024-07-29 PROBLEM — G89.29 CHRONIC RIGHT SHOULDER PAIN: Status: ACTIVE | Noted: 2024-07-26

## 2024-08-06 ENCOUNTER — TELEPHONE (OUTPATIENT)
Dept: PAIN MEDICINE | Facility: CLINIC | Age: 59
End: 2024-08-06

## 2024-08-06 NOTE — TELEPHONE ENCOUNTER
He will need to follow up with orthopedics in regards to this as we typically do not order MRIs of the shoulder. I would have him follow up with Dr. Townsend.

## 2024-08-06 NOTE — TELEPHONE ENCOUNTER
"    Caller: LYUDMILA DENNY \"NONA\"    Relationship: PATIENT     Best call back number: [phone] OKAY TO LVM     What orders are you requesting (i.e. lab or imaging): MRI FOR RIGHT SHOULDER     In what timeframe would the patient need to come in: ASAP    Where will you receive your lab/imaging services: PATIENT STATES HE WILL GO ANYWHERE BUT HE TYPICALLY IS GIVEN THE ORDER AND THEN HE CALLS INSURANCE TO SEE WHERE THEY WANT HIM TO GO     Additional notes: PATIENT STATES HIS RIGHT SHOULDER IS PAINFUL, CAUSING HIS RIGHT ARM TO GO NUMB .         "

## 2024-08-07 NOTE — TELEPHONE ENCOUNTER
"Hub staff attempted to follow warm transfer process and was unsuccessful     Caller: Colby Alvarado \"NONA\"    Relationship to patient: Self    Best call back number: [phone] (home)     Patient is needing: PATIENT IS CALLING BACK TO GET A RESPONSE ON WHAT TO DO.   UNABLE TO RELAY PREVIOUS MESSAGE BECAUSE IT DOES SAY HUB TO RELAY OR READ   "

## 2024-08-14 NOTE — PROGRESS NOTES
"Date of Office Visit: 08/15/2024  Encounter Provider: LUIS EDUARDO Frias  Place of Service: [facility] CARDIOLOGY  Patient Name: Colby Alvarado  :1965    Chief complaint  chest pain, hypertension, paroxysmal atrial fibrillation     History of Present Illness  Patient is a 58 y.o. year old male  patient of Dr. Cedillo. Past medical history includes hypertension, hyperlipidemia, obstructive sleep apnea (on CPAP) and paroxysmal atrial fibrillation.  In 2019 he underwent pulmonary vein ablation Eliquis was continued for 3 months.  Patient had an echocardiogram at the same time that showed normal left ventricular systolic function with mild left ventricular hypertrophy \"abnormal systolic strain pattern\" was noted though civics were not provided.  On  he was seen by Dr. Duran for chest pain hypertension and sleep disorder following the ablation.  Chest pain resolved in 2019 after taking Carafate.  He was started on hydrochlorothiazide for hypertension and subsequently found to have obstructive sleep apnea.      Echocardiogram 2021 showed LVEF 55.9%, normal diastolic function, mild calcification of the aortic valve with no regurgitation or stenosis, trace mitral regurgitation, and normal right-sided pressures.  Exercise perfusion stress test was negative for ischemia.  Holter monitor showed predominant sinus rhythm with rare PACs and no evidence of sustained dysrhythmia.    Interval history  Patient presents today for routine follow up. I will visit with him for the first time today and have reviewed his medical record. He is having significant shoulder and neck pain and is following with neurology, pain management, and orthopedics. There are procedures planned, though nothing has been scheduled as of yet. He denies cardiac symptoms including palpitations, shortness of breath,edema, dizziness, chest pain or chest pressure, fatigue, syncope or presyncope. He " recently received a new CPAP device and resumed use of this. He has appointment upcoming with sleep medicine to adjust settings. Blood pressure at home has been as it is today. He does not exercise though he is active at work with no exertional symptoms.    Past Medical History:   Diagnosis Date    Anxiety     Arthritis of back     Atrial fibrillation     Chronic pain     Depression     H/O cluster headache     Hearing loss     Hyperlipidemia     Hypertension     Insomnia     CAL on CPAP 11/23/2020    Home sleep study.  KALEIGH 6.4 events per hour.  Low oxygen saturation 84%    Periarthritis of shoulder     Seasonal allergies      Past Surgical History:   Procedure Laterality Date    BACK SURGERY      CARDIAC ABLATION  07/31/2019    CERVICAL EPIDURAL Bilateral 4/5/2022    Procedure: CERVICAL EPIDURAL;  Surgeon: Cruz Aguayo MD;  [facility];  Service: Pain Management;  Laterality: Bilateral;    CERVICAL EPIDURAL N/A 5/19/2022    Procedure: CERVICAL EPIDURAL;  Surgeon: Cruz Aguayo MD;  [facility];  Service: Pain Management;  Laterality: N/A;    CERVICAL EPIDURAL N/A 11/28/2023    Procedure: CERVICAL EPIDURAL STEROID INJECTION CPT: 03053;  Surgeon: Cruz Aguayo MD;  [facility];  Service: Pain Management;  Laterality: N/A;    EPIDURAL Bilateral 8/23/2022    Procedure: L4 TRANSFORAMINAL LUMBAR EPIDURAL STEROID INJECTION;  Surgeon: Cruz Aguayo MD;  [facility];  Service: Pain Management;  Laterality: Bilateral;    EYE MUSCLE SURGERY Left     HAND SURGERY Right     open fracture plate    HARDWARE REMOVAL Right     rt hand  plate    HERNIA REPAIR      MEDIAL BRANCH BLOCK Bilateral 11/10/2022    Procedure: MEDIAL BRANCH BLOCK LUMBAR bilateral L3-L5;  Surgeon: Cruz Aguayo MD;  [facility];  Service: Pain Management;  Laterality: Bilateral;    MEDIAL BRANCH BLOCK Bilateral 3/16/2023    Procedure: BILATEAL L3-L5 LUMBAR MEDIAL BRANCH  BLOCK;  Surgeon: Cruz Aguayo MD;  Location: [facility] OR;  Service: Pain Management;  Laterality: Bilateral;    NASAL FRACTURE SURGERY      RADIOFREQUENCY ABLATION Bilateral 5/11/2023    Procedure: BILATERAL L3-L5 RADIOFREQUENCY ABLATION LUMBAR CPT: 87126, 38109;  Surgeon: Cruz Aguayo MD;  Location: [facility] MA[facility] OR;  Service: Pain Management;  Laterality: Bilateral;    SHOULDER ARTHROSCOPY W/ ROTATOR CUFF REPAIR Right 3/13/2019    Procedure: SHOULDER ARTHROSCOPY, DEBRIDEIMENT OF LABRUM AND SUBSCAP, DECEOMPRESSION, DISTAL CLAVICLE EXCISION,  WITH MINI OPEN ROTATOR CUFF REPAIR;  Surgeon: Estela Townsend MD;  Location: [facility];  Service: Orthopedics    SHOULDER SURGERY      SPINE SURGERY      VASECTOMY       Outpatient Medications Prior to Visit   Medication Sig Dispense Refill    atorvastatin (LIPITOR) 80 MG tablet Take 1 tablet by mouth Every Night.      cetirizine (zyrTEC) 10 MG tablet Take 1 tablet by mouth Daily.      cyclobenzaprine (FLEXERIL) 10 MG tablet TAKE 1 TABLET BY MOUTH TWICE DAILY AS NEEDED FOR MUSCLE SPASMS 60 tablet 2    Galcanezumab-gnlm (Emgality, 300 MG Dose,) 100 MG/ML solution prefilled syringe As Needed.      HYDROcodone-acetaminophen (NORCO)  MG per tablet Take 1 tablet every 4-6 hours as needed for pain. Max 5/day. 30 day supply. DNF 8/1/24 150 tablet 0    lisinopril (PRINIVIL,ZESTRIL) 10 MG tablet Take 1 tablet by mouth 2 (two) times a day. 60 tablet 6    meloxicam (MOBIC) 15 MG tablet TAKE 1 TABLET BY MOUTH DAILY 30 tablet 1    metoprolol succinate XL (TOPROL-XL) 100 MG 24 hr tablet Take 1 tablet by mouth Daily. Patient to take an extra 1/2 tablet daily as needed 135 tablet 0    SUMAtriptan Succinate (IMITREX) 6 MG/0.5ML injection sumatriptan 6 mg/0.5 mL subcutaneous pen injector      zolpidem (AMBIEN) 10 MG tablet Take 1 tablet by mouth At Night As Needed for Sleep.      ezetimibe (ZETIA) 10 MG tablet Take 1 tablet by mouth every night at bedtime.      topiramate  (TOPAMAX) 50 MG tablet TAKE 1 TABLET (50MG) NIGHTLY FOR 1 WEEK, THEN TAKE TWICE DAILY       No facility-administered medications prior to visit.       Allergies as of 08/15/2024    (No Known Allergies)     Social History     Socioeconomic History    Marital status:    Tobacco Use    Smoking status: Every Day     Current packs/day: 1.00     Average packs/day: 1 pack/day for 41.6 years (41.6 ttl pk-yrs)     Types: Cigarettes     Start date: 1983    Smokeless tobacco: Former     Types: Snuff   Vaping Use    Vaping status: Some Days    Substances: Nicotine    Devices: Pre-filled or refillable cartridge   Substance and Sexual Activity    Alcohol use: Not Currently     Alcohol/week: 16.0 standard drinks of alcohol     Types: 16 Cans of beer per week     Comment: caffiene    Drug use: No    Sexual activity: Defer     Family History   Problem Relation Age of Onset    Emphysema Mother     Hypertension Mother     Heart disease Mother     Cancer Mother         Oral    Hypertension Sister     No Known Problems Brother     Heart attack Maternal Grandmother     Emphysema Maternal Grandmother     Heart disease Maternal Grandmother     Stroke Maternal Grandmother     Heart attack Maternal Grandfather     Emphysema Maternal Grandfather     Sleep apnea Other     Hypertension Other     Heart disease Other     Lung disease Other     Malig Hyperthermia Neg Hx      Review of Systems   Constitutional: Negative for malaise/fatigue.   Cardiovascular:  Negative for chest pain, claudication, dyspnea on exertion, leg swelling, near-syncope, orthopnea, palpitations, paroxysmal nocturnal dyspnea and syncope.   Respiratory:  Negative for shortness of breath.    Neurological:  Negative for brief paralysis, dizziness, headaches and light-headedness.   All other systems reviewed and are negative.       Objective:     Vitals:    08/15/24 0754   BP: 127/82   BP Location: Left arm   Patient Position: Sitting   Cuff Size: Adult   Pulse: 77  "  SpO2: 96%   Weight: 94.5 kg (208 lb 6.4 oz)   Height: 177.8 cm (70\")     Body mass index is 29.9 kg/m².    Vitals reviewed.   Constitutional:       General: Not in acute distress.     Appearance: Well-developed. Not diaphoretic.   HENT:      Head: Normocephalic.   Pulmonary:      Effort: Pulmonary effort is normal. No respiratory distress.      Breath sounds: Normal breath sounds. No wheezing. No rhonchi. No rales.   Cardiovascular:      Normal rate. Regular rhythm.      Murmurs: There is no murmur.   Pulses:     Radial: 2+ bilaterally.  Edema:     Peripheral edema absent.   Skin:     General: Skin is warm and dry. There is no cyanosis.      Findings: No rash.   Neurological:      Mental Status: Alert and oriented to person, place, and time.   Psychiatric:         Behavior: Behavior normal. Behavior is cooperative.         Thought Content: Thought content normal.         Judgment: Judgment normal.       Lab Review:     Lab Results   Component Value Date     02/01/2024     08/26/2019    K 4.6 02/01/2024    K 3.8 08/26/2019     (H) 02/01/2024     08/26/2019    CO2 21.3 (L) 02/01/2024    CO2 23.0 08/26/2019    BUN 16 02/01/2024    BUN 12 08/26/2019    CREATININE 0.93 02/01/2024    CREATININE 0.89 08/26/2019    EGFRIFNONA 90 08/26/2019    EGFRIFNONA 94 03/01/2019    GLUCOSE 112 (H) 02/01/2024    GLUCOSE 106 (H) 08/26/2019    CALCIUM 9.4 02/01/2024    CALCIUM 9.5 08/26/2019    ALBUMIN 4.4 02/01/2024    ALBUMIN 4.90 08/26/2019    BILITOT 0.5 02/01/2024    BILITOT 0.7 08/26/2019    AST 26 02/01/2024    AST 31 08/26/2019    ALT 52 (H) 02/01/2024    ALT 40 08/26/2019     Lab Results   Component Value Date    WBC 8.60 08/26/2019    WBC 8.94 03/01/2019    HGB 15.3 08/26/2019    HGB 15.4 03/01/2019    HCT 45.1 08/26/2019    HCT 45.9 03/01/2019    MCV 92.2 08/26/2019    MCV 95.4 03/01/2019     08/26/2019     03/01/2019     Lab Results   Component Value Date    PROBNP 59.7 08/26/2019 " "    Lab Results   Component Value Date    TROPONINT <0.010 04/02/2021     No results found for: \"TSH\"          ECG 12 Lead    Date/Time: 8/15/2024 9:09 AM  Performed by: Demetra Cherry APRN    Authorized by: Demetra Cherry APRN  Comparison: compared with previous ECG   Similar to previous ECG  Rhythm: sinus rhythm  Rate: normal  BPM: 77  QRS axis: normal  Other findings: left ventricular hypertrophy  Comments: Similar to prior        Assessment:    No diagnosis found.  Plan:       1.  Chest pain.  Resolved after decreasing caffeine and resuming CPAP.  2.  Abnormal LV strain noted on prior echo.  Echo in 2021 with normal strain and LVEF.  3.  Hypertension.  Controlled on current regimen.  He will continue to monitor blood pressure at home and call if consistently greater than 130/80.  4.  Hyperlipidemia.  On high dose statin.  Zetia has been discontinued by PCP.  5.  Paroxysmal atrial fibrillation, status post pulmonary vein ablation in 7/19.  No recurrence on prior monitor study.  Not on anticoagulation due to low [id number] score and no known recurrence of atrial fibrillation.  6.  Nicotine use.  Fortunately, he continues to smoke and is not motivated to quit.  7.  History migraine headaches, on sumatriptan and Emgality and follows with Dr. Salians  8.  Obstructive sleep apnea on CPAP.  Back on CPAP with upcoming appointment to review settings.      Time Spent: I spent 30 minutes caring for Colby on this date of service. This time includes time spent by me in the following activities: preparing for the visit, reviewing tests, performing a medically appropriate examination and/or evaluations, counseling and educating the patient/family/caregiver, ordering medications, tests, or procedures, documenting information in the medical record, and independently interpreting results and communicating that information with the patient/family/caregiver.   I spent 1 minutes on the separately reported service of ECG. " This time is not included in the time used to support the E/M service also reported today.        Your medication list            Accurate as of August 15, 2024  9:10 AM. If you have any questions, ask your nurse or doctor.                CONTINUE taking these medications        Instructions Last Dose Given Next Dose Due   atorvastatin 80 MG tablet  Commonly known as: LIPITOR      Take 1 tablet by mouth Every Night.       cetirizine 10 MG tablet  Commonly known as: zyrTEC      Take 1 tablet by mouth Daily.       cyclobenzaprine 10 MG tablet  Commonly known as: FLEXERIL      TAKE 1 TABLET BY MOUTH TWICE DAILY AS NEEDED FOR MUSCLE SPASMS       Emgality (300 MG Dose) 100 MG/ML solution prefilled syringe  Generic drug: Galcanezumab-gnlm      As Needed.       HYDROcodone-acetaminophen  MG per tablet  Commonly known as: NORCO      Take 1 tablet every 4-6 hours as needed for pain. Max 5/day. 30 day supply. DNF 8/1/24       lisinopril 10 MG tablet  Commonly known as: PRINIVIL,ZESTRIL      Take 1 tablet by mouth 2 (two) times a day.       meloxicam 15 MG tablet  Commonly known as: MOBIC      TAKE 1 TABLET BY MOUTH DAILY       metoprolol succinate  MG 24 hr tablet  Commonly known as: TOPROL-XL      Take 1 tablet by mouth Daily. Patient to take an extra 1/2 tablet daily as needed       SUMAtriptan Succinate 6 MG/0.5ML injection  Commonly known as: IMITREX      sumatriptan 6 mg/0.5 mL subcutaneous pen injector       zolpidem 10 MG tablet  Commonly known as: AMBIEN      Take 1 tablet by mouth At Night As Needed for Sleep.                Patient is no longer taking -.  I corrected the med list to reflect this.  I did not stop these medications.    Return in about 6 months (around 2/15/2025) for with Dr. Cedillo (must see Dr. Cedillo).      Dictated utilizing Dragon dictation

## 2024-08-15 ENCOUNTER — OFFICE VISIT (OUTPATIENT)
Dept: CARDIOLOGY | Facility: CLINIC | Age: 59
End: 2024-08-15
Payer: COMMERCIAL

## 2024-08-15 VITALS
HEIGHT: 70 IN | BODY MASS INDEX: 29.84 KG/M2 | OXYGEN SATURATION: 96 % | WEIGHT: 208.4 LBS | SYSTOLIC BLOOD PRESSURE: 127 MMHG | DIASTOLIC BLOOD PRESSURE: 82 MMHG | HEART RATE: 77 BPM

## 2024-08-15 DIAGNOSIS — Z86.69 HISTORY OF MIGRAINE: ICD-10-CM

## 2024-08-15 DIAGNOSIS — I48.0 PAF (PAROXYSMAL ATRIAL FIBRILLATION): Primary | ICD-10-CM

## 2024-08-15 DIAGNOSIS — E78.2 HYPERLIPEMIA, MIXED: ICD-10-CM

## 2024-08-15 DIAGNOSIS — I10 ESSENTIAL HYPERTENSION: ICD-10-CM

## 2024-08-15 DIAGNOSIS — G47.33 OSA (OBSTRUCTIVE SLEEP APNEA): ICD-10-CM

## 2024-08-15 PROCEDURE — 99214 OFFICE O/P EST MOD 30 MIN: CPT | Performed by: NURSE PRACTITIONER

## 2024-08-15 PROCEDURE — 93000 ELECTROCARDIOGRAM COMPLETE: CPT | Performed by: NURSE PRACTITIONER

## 2024-08-15 NOTE — PROGRESS NOTES
New Shoulder      Patient: Colby Alvarado        YOB: 1965    Medical Record Number: [id number]        Chief Complaints:   Right shoulder pain    History of Present Illness: This is a 58-year-old male who I last saw in February for his left shoulder he had right shoulder surgery in 2019 he states that was doing well until recently the left foot is much better he is right-hand dominant no no history injury change in activity he is complaining of shoulder pain but also pain down all the way down the arm with some numbness and tingling does have a history of neck issues as well as past medical history as listed below      Allergies: No Known Allergies    Medications:   Home Medications:  Current Outpatient Medications on File Prior to Visit   Medication Sig    atorvastatin (LIPITOR) 80 MG tablet Take 1 tablet by mouth Every Night.    cetirizine (zyrTEC) 10 MG tablet Take 1 tablet by mouth Daily.    cyclobenzaprine (FLEXERIL) 10 MG tablet TAKE 1 TABLET BY MOUTH TWICE DAILY AS NEEDED FOR MUSCLE SPASMS    Galcanezumab-gnlm (Emgality, 300 MG Dose,) 100 MG/ML solution prefilled syringe As Needed.    HYDROcodone-acetaminophen (NORCO)  MG per tablet Take 1 tablet every 4-6 hours as needed for pain. Max 5/day. 30 day supply. DNF 8/1/24    lisinopril (PRINIVIL,ZESTRIL) 10 MG tablet Take 1 tablet by mouth 2 (two) times a day.    meloxicam (MOBIC) 15 MG tablet TAKE 1 TABLET BY MOUTH DAILY    metoprolol succinate XL (TOPROL-XL) 100 MG 24 hr tablet Take 1 tablet by mouth Daily. Patient to take an extra 1/2 tablet daily as needed    SUMAtriptan Succinate (IMITREX) 6 MG/0.5ML injection sumatriptan 6 mg/0.5 mL subcutaneous pen injector    zolpidem (AMBIEN) 10 MG tablet Take 1 tablet by mouth At Night As Needed for Sleep.     No current facility-administered medications on file prior to visit.     Current Medications:  Scheduled Meds:  Continuous Infusions:No current facility-administered medications for this  visit.    PRN Meds:.    Past Medical History:   Diagnosis Date    Anxiety     Arthritis of back     Atrial fibrillation     Chronic pain     Depression     H/O cluster headache     Hearing loss     Hyperlipidemia     Hypertension     Insomnia     CAL on CPAP 11/23/2020    Home sleep study.  KALEIGH 6.4 events per hour.  Low oxygen saturation 84%    Periarthritis of shoulder     Seasonal allergies         Past Surgical History:   Procedure Laterality Date    BACK SURGERY      CARDIAC ABLATION  07/31/2019    CERVICAL EPIDURAL Bilateral 4/5/2022    Procedure: CERVICAL EPIDURAL;  Surgeon: Cruz Aguayo MD;  [facility];  Service: Pain Management;  Laterality: Bilateral;    CERVICAL EPIDURAL N/A 5/19/2022    Procedure: CERVICAL EPIDURAL;  Surgeon: Cruz Aguayo MD;  [facility];  Service: Pain Management;  Laterality: N/A;    CERVICAL EPIDURAL N/A 11/28/2023    Procedure: CERVICAL EPIDURAL STEROID INJECTION CPT: 54324;  Surgeon: Cruz Aguayo MD;  [facility];  Service: Pain Management;  Laterality: N/A;    EPIDURAL Bilateral 8/23/2022    Procedure: L4 TRANSFORAMINAL LUMBAR EPIDURAL STEROID INJECTION;  Surgeon: Cruz Aguayo MD;  [facility];  Service: Pain Management;  Laterality: Bilateral;    EYE MUSCLE SURGERY Left     HAND SURGERY Right     open fracture plate    HARDWARE REMOVAL Right     rt hand  plate    HERNIA REPAIR      MEDIAL BRANCH BLOCK Bilateral 11/10/2022    Procedure: MEDIAL BRANCH BLOCK LUMBAR bilateral L3-L5;  Surgeon: Cruz Aguayo MD;  [facility];  Service: Pain Management;  Laterality: Bilateral;    MEDIAL BRANCH BLOCK Bilateral 3/16/2023    Procedure: BILATEAL L3-L5 LUMBAR MEDIAL BRANCH BLOCK;  Surgeon: Cruz Aguayo MD;  [facility];  Service: Pain Management;  Laterality: Bilateral;    NASAL FRACTURE SURGERY      RADIOFREQUENCY ABLATION Bilateral 5/11/2023    Procedure: BILATERAL L3-L5  RADIOFREQUENCY ABLATION LUMBAR CPT: 51834, 26616;  Surgeon: Cruz Aguayo MD;  [place] [facility] [place]  Service: Pain Management;  Laterality: Bilateral;    SHOULDER ARTHROSCOPY W/ ROTATOR CUFF REPAIR Right 3/13/2019    Procedure: SHOULDER ARTHROSCOPY, DEBRIDEIMENT OF LABRUM AND SUBSCAP, DECEOMPRESSION, DISTAL CLAVICLE EXCISION,  WITH MINI OPEN ROTATOR CUFF REPAIR;  Surgeon: Estela Townsend MD;  Location: [facility] [place];  Service: Orthopedics    SHOULDER SURGERY      SPINE SURGERY      VASECTOMY          Social History     Occupational History    Not on file   Tobacco Use    Smoking status: Every Day     Current packs/day: 1.00     Average packs/day: 1 pack/day for 41.6 years (41.6 ttl pk-yrs)     Types: Cigarettes     Start date: 1983     Passive exposure: Current    Smokeless tobacco: Former     Types: Snuff   Vaping Use    Vaping status: Some Days    Substances: Nicotine    Devices: Pre-filled or refillable cartridge   Substance and Sexual Activity    Alcohol use: Not Currently     Alcohol/week: 16.0 standard drinks of alcohol     Types: 16 Cans of beer per week     Comment: caffiene    Drug use: No    Sexual activity: Defer      Social History     Social History Narrative    6 sodas per day        Family History   Problem Relation Age of Onset    Emphysema Mother     Hypertension Mother     Heart disease Mother     Cancer Mother         Oral    Hypertension Sister     No Known Problems Brother     Heart attack Maternal Grandmother     Emphysema Maternal Grandmother     Heart disease Maternal Grandmother     Stroke Maternal Grandmother     Heart attack Maternal Grandfather     Emphysema Maternal Grandfather     Sleep apnea Other     Hypertension Other     Heart disease Other     Lung disease Other     Malig Hyperthermia Neg Hx              Review of Systems:     Review of Systems      Physical Exam: 58 y.o. male  General Appearance:    Alert, cooperative, in no acute distress                   Vitals:     "08/19/24 1404   Temp: 98.6 °F (37 °C)   TempSrc: Temporal   Weight: 95.1 kg (209 lb 9.6 oz)   Height: 177.8 cm (70\")   PainSc:   5   PainLoc: Shoulder      Patient is alert and read ×3 no acute distress appears her above-listed at height weight and age.  Affect is normal respiratory rate is normal unlabored. Heart rate regular rate rhythm, sclera, dentition and hearing are normal for the purpose of this exam.    Ortho Exam  Exam his right shoulder he has forward flexion to 180 abduction similar external rotation to 50 internal rotation lumbar spine rotator cuff strength 4+/5 this does give rise to some pain in the shoulder little down the biceps he does have marked decrease in cervical range of motion with what I think is true radicular symptoms he appears neurologically intact of both upper extremities  Large Joint Arthrocentesis: R subacromial bursa  Date/Time: 8/19/2024 2:31 PM  Consent given by: patient  Site marked: site marked  Timeout: Immediately prior to procedure a time out was called to verify the correct patient, procedure, equipment, support staff and site/side marked as required   Supporting Documentation  Indications: pain   Procedure Details  Location: shoulder - R subacromial bursa  Preparation: Patient was prepped and draped in the usual sterile fashion  Needle gauge: 21G.  Approach: posterior  Medications administered: 80 mg methylPREDNISolone acetate 80 MG/ML; 2 mL lidocaine PF 1% 1 %  Patient tolerance: patient tolerated the procedure well with no immediate complications              Radiology:   AP, Scapular Y and Axillary Lateral of the right shoulder were ordered/reviewed to evauate shoulder pain.  I have comparative films from before surgery these look fine he has some changes in the tuberosity where the anchors are there absorbable anchors but you do see some sclerosis to wear those anchors were nothing that looks acute  Imaging Results (Most Recent)       Procedure Component Value Units " Date/Time    XR Shoulder 2+ View Right [[id number]] Resulted: 08/19/24 1348     Updated: 08/19/24 1348    Impression:      Ordering physician's impression is located in the Encounter Note dated 08/19/24. X-ray performed in the DR room.            Assessment/Plan: Right shoulder pain I think some of this is neck some of this might be shoulder he may have close plan is to proceed with an injection as a diagnostic and therapeutic tool he was agreeable to do this he is also seeing his neck doctor this week or in the near future  Cortisone Injection. See procedure note.  Cortisone Injection for DIAGNOSTIC and THERAPUTIC purposes.

## 2024-08-19 ENCOUNTER — OFFICE VISIT (OUTPATIENT)
Dept: ORTHOPEDIC SURGERY | Facility: CLINIC | Age: 59
End: 2024-08-19
Payer: COMMERCIAL

## 2024-08-19 VITALS — TEMPERATURE: 98.6 F | HEIGHT: 70 IN | WEIGHT: 209.6 LBS | BODY MASS INDEX: 30.01 KG/M2

## 2024-08-19 DIAGNOSIS — R52 PAIN: Primary | ICD-10-CM

## 2024-08-19 DIAGNOSIS — M75.41 IMPINGEMENT SYNDROME OF RIGHT SHOULDER: ICD-10-CM

## 2024-08-19 PROCEDURE — 20610 DRAIN/INJ JOINT/BURSA W/O US: CPT | Performed by: ORTHOPAEDIC SURGERY

## 2024-08-19 PROCEDURE — 99213 OFFICE O/P EST LOW 20 MIN: CPT | Performed by: ORTHOPAEDIC SURGERY

## 2024-08-19 PROCEDURE — 73030 X-RAY EXAM OF SHOULDER: CPT | Performed by: ORTHOPAEDIC SURGERY

## 2024-08-19 RX ADMIN — METHYLPREDNISOLONE ACETATE 80 MG: 80 INJECTION, SUSPENSION INTRA-ARTICULAR; INTRALESIONAL; INTRAMUSCULAR; SOFT TISSUE at 14:31

## 2024-08-19 RX ADMIN — LIDOCAINE HYDROCHLORIDE 2 ML: 10 INJECTION, SOLUTION EPIDURAL; INFILTRATION; INTRACAUDAL; PERINEURAL at 14:31

## 2024-08-20 RX ORDER — METHYLPREDNISOLONE ACETATE 80 MG/ML
80 INJECTION, SUSPENSION INTRA-ARTICULAR; INTRALESIONAL; INTRAMUSCULAR; SOFT TISSUE
Status: COMPLETED | OUTPATIENT
Start: 2024-08-19 | End: 2024-08-19

## 2024-08-20 RX ORDER — LIDOCAINE HYDROCHLORIDE 10 MG/ML
2 INJECTION, SOLUTION EPIDURAL; INFILTRATION; INTRACAUDAL; PERINEURAL
Status: COMPLETED | OUTPATIENT
Start: 2024-08-19 | End: 2024-08-19

## 2024-08-23 ENCOUNTER — TELEPHONE (OUTPATIENT)
Dept: PAIN MEDICINE | Facility: CLINIC | Age: 59
End: 2024-08-23

## 2024-08-28 DIAGNOSIS — M51.36 DDD (DEGENERATIVE DISC DISEASE), LUMBAR: ICD-10-CM

## 2024-08-28 DIAGNOSIS — M50.20 DISPLACEMENT OF CERVICAL INTERVERTEBRAL DISC WITHOUT MYELOPATHY: ICD-10-CM

## 2024-08-28 NOTE — TELEPHONE ENCOUNTER
"Caller: Colby Alvarado \"NONA\"    Relationship: Self    Best call back number: [phone] (home)     Requested Prescriptions:   Requested Prescriptions     Pending Prescriptions Disp Refills    HYDROcodone-acetaminophen (NORCO)  MG per tablet 150 tablet 0     Sig: Take 1 tablet every 4-6 hours as needed for pain. Max 5/day. 30 day supply. DNF 8/1/24        Pharmacy where request should be sent: [id number] [address] [facility] [address] [facility] - [phone]  - [phone] FX     Last office visit with prescribing clinician: 7/26/2024   Last telemedicine visit with prescribing clinician: Visit date not found   Next office visit with prescribing clinician: 9/27/2024     Additional details provided by patient: PATIENT WILL HAVE ENOUGH TO GET THROUGH 8/31/24, THEN HE WILL BE OUT.     Does the patient have less than a 3 day supply:  [] Yes  [x] No    Jak Foreman   08/28/24 08:26 EDT     "

## 2024-08-29 RX ORDER — HYDROCODONE BITARTRATE AND ACETAMINOPHEN 10; 325 MG/1; MG/1
TABLET ORAL
Qty: 150 TABLET | Refills: 0 | Status: SHIPPED | OUTPATIENT
Start: 2024-08-29

## 2024-08-29 NOTE — TELEPHONE ENCOUNTER
Reviewed UDS and CARI. Both updated and appropriate. Refill appropriate.  DNF 8/30/24. OK to fill this date due to pharmacy hours and holiday on 9/1/24

## 2024-09-10 DIAGNOSIS — M51.36 DDD (DEGENERATIVE DISC DISEASE), LUMBAR: ICD-10-CM

## 2024-09-10 DIAGNOSIS — G89.29 CHRONIC LEFT SHOULDER PAIN: ICD-10-CM

## 2024-09-10 DIAGNOSIS — M25.512 CHRONIC LEFT SHOULDER PAIN: ICD-10-CM

## 2024-09-10 RX ORDER — MELOXICAM 15 MG/1
15 TABLET ORAL DAILY
Qty: 30 TABLET | Refills: 1 | Status: SHIPPED | OUTPATIENT
Start: 2024-09-10

## 2024-09-27 ENCOUNTER — OFFICE VISIT (OUTPATIENT)
Dept: PAIN MEDICINE | Facility: CLINIC | Age: 59
End: 2024-09-27
Payer: COMMERCIAL

## 2024-09-27 ENCOUNTER — PREP FOR SURGERY (OUTPATIENT)
Dept: SURGERY | Facility: SURGERY CENTER | Age: 59
End: 2024-09-27
Payer: COMMERCIAL

## 2024-09-27 VITALS
SYSTOLIC BLOOD PRESSURE: 153 MMHG | HEIGHT: 70 IN | WEIGHT: 208.4 LBS | BODY MASS INDEX: 29.84 KG/M2 | DIASTOLIC BLOOD PRESSURE: 83 MMHG | OXYGEN SATURATION: 99 % | HEART RATE: 76 BPM | TEMPERATURE: 97.7 F

## 2024-09-27 DIAGNOSIS — M50.20 DISPLACEMENT OF CERVICAL INTERVERTEBRAL DISC WITHOUT MYELOPATHY: ICD-10-CM

## 2024-09-27 DIAGNOSIS — Z79.1 NSAID LONG-TERM USE: ICD-10-CM

## 2024-09-27 DIAGNOSIS — M48.02 CERVICAL STENOSIS OF SPINE: ICD-10-CM

## 2024-09-27 DIAGNOSIS — Z79.899 HIGH RISK MEDICATION USE: ICD-10-CM

## 2024-09-27 DIAGNOSIS — M54.12 CERVICAL RADICULOPATHY: Primary | ICD-10-CM

## 2024-09-27 DIAGNOSIS — M51.369 DDD (DEGENERATIVE DISC DISEASE), LUMBAR: ICD-10-CM

## 2024-09-27 DIAGNOSIS — M62.838 MUSCLE SPASM: ICD-10-CM

## 2024-09-27 DIAGNOSIS — M54.16 LUMBAR RADICULITIS: ICD-10-CM

## 2024-09-27 PROCEDURE — 99214 OFFICE O/P EST MOD 30 MIN: CPT

## 2024-09-27 RX ORDER — SODIUM CHLORIDE 0.9 % (FLUSH) 0.9 %
10 SYRINGE (ML) INJECTION EVERY 12 HOURS SCHEDULED
OUTPATIENT
Start: 2024-09-27

## 2024-09-27 RX ORDER — HYDROCODONE BITARTRATE AND ACETAMINOPHEN 10; 325 MG/1; MG/1
TABLET ORAL
Qty: 150 TABLET | Refills: 0 | Status: SHIPPED | OUTPATIENT
Start: 2024-09-27

## 2024-10-08 DIAGNOSIS — M62.838 MUSCLE SPASM: ICD-10-CM

## 2024-10-08 RX ORDER — CYCLOBENZAPRINE HCL 10 MG
10 TABLET ORAL 2 TIMES DAILY PRN
Qty: 60 TABLET | Refills: 2 | Status: SHIPPED | OUTPATIENT
Start: 2024-10-08

## 2024-10-16 RX ORDER — METOPROLOL SUCCINATE 100 MG/1
TABLET, EXTENDED RELEASE ORAL
Qty: 135 TABLET | Refills: 0 | OUTPATIENT
Start: 2024-10-16

## 2024-10-16 RX ORDER — METOPROLOL SUCCINATE 100 MG/1
TABLET, EXTENDED RELEASE ORAL
Qty: 135 TABLET | Refills: 2 | Status: SHIPPED | OUTPATIENT
Start: 2024-10-16

## 2024-10-18 ENCOUNTER — LAB (OUTPATIENT)
Dept: FAMILY MEDICINE CLINIC | Facility: CLINIC | Age: 59
End: 2024-10-18
Payer: COMMERCIAL

## 2024-10-18 DIAGNOSIS — Z79.1 NSAID LONG-TERM USE: ICD-10-CM

## 2024-10-18 PROCEDURE — 36415 COLL VENOUS BLD VENIPUNCTURE: CPT

## 2024-10-23 LAB — NTI SERUM GEL TUBE: NORMAL

## 2024-10-28 DIAGNOSIS — M51.369 DDD (DEGENERATIVE DISC DISEASE), LUMBAR: ICD-10-CM

## 2024-10-28 DIAGNOSIS — M50.20 DISPLACEMENT OF CERVICAL INTERVERTEBRAL DISC WITHOUT MYELOPATHY: ICD-10-CM

## 2024-10-28 RX ORDER — HYDROCODONE BITARTRATE AND ACETAMINOPHEN 10; 325 MG/1; MG/1
TABLET ORAL
Qty: 150 TABLET | Refills: 0 | Status: SHIPPED | OUTPATIENT
Start: 2024-10-28

## 2024-10-28 NOTE — TELEPHONE ENCOUNTER
"Caller: Colby Alvarado \"NONA\"    Relationship: Self    Best call back number: [phone] (home)     Requested Prescriptions:   Requested Prescriptions     Pending Prescriptions Disp Refills    HYDROcodone-acetaminophen (NORCO)  MG per tablet 150 tablet 0     Sig: Take 1 tablet every 4-6 hours as needed for pain. Max 5/day. 30 day supply. DNF 9/28/24        Pharmacy where request should be sent: [facility] #[id number] [address] [facility] [address]  & [address] [facility] - [phone]  - [phone] FX     Last office visit with prescribing clinician: 9/27/2024   Last telemedicine visit with prescribing clinician: Visit date not found   Next office visit with prescribing clinician: 11/25/2024     Additional details provided by patient: RUNS OUT TOMORROW    Does the patient have less than a 3 day supply:  [x] Yes  [] No    Jak Foreman Rep   10/28/24 08:21 EDT     "

## 2024-11-05 ENCOUNTER — PREP FOR SURGERY (OUTPATIENT)
Dept: OTHER | Facility: HOSPITAL | Age: 59
End: 2024-11-05
Payer: COMMERCIAL

## 2024-11-05 DIAGNOSIS — M54.12 CERVICAL RADICULOPATHY: Primary | ICD-10-CM

## 2024-11-05 RX ORDER — METHYLPREDNISOLONE ACETATE 80 MG/ML
80 INJECTION, SUSPENSION INTRA-ARTICULAR; INTRALESIONAL; INTRAMUSCULAR; SOFT TISSUE ONCE
Status: CANCELLED | OUTPATIENT
Start: 2024-11-06

## 2024-11-05 RX ORDER — BUPIVACAINE HYDROCHLORIDE 2.5 MG/ML
10 INJECTION, SOLUTION EPIDURAL; INFILTRATION; INTRACAUDAL ONCE
Status: CANCELLED | OUTPATIENT
Start: 2024-11-06

## 2024-11-05 RX ORDER — LIDOCAINE HYDROCHLORIDE 10 MG/ML
5 INJECTION, SOLUTION INFILTRATION; PERINEURAL ONCE
Status: CANCELLED | OUTPATIENT
Start: 2024-11-06

## 2024-11-05 RX ORDER — IOPAMIDOL 408 MG/ML
12 INJECTION, SOLUTION INTRATHECAL
Status: CANCELLED | OUTPATIENT
Start: 2024-11-06

## 2024-11-06 ENCOUNTER — HOSPITAL ENCOUNTER (OUTPATIENT)
Dept: GENERAL RADIOLOGY | Facility: HOSPITAL | Age: 59
Discharge: HOME | End: 2024-11-06
Payer: COMMERCIAL

## 2024-11-06 ENCOUNTER — HOSPITAL ENCOUNTER (OUTPATIENT)
Dept: PAIN MEDICINE | Facility: HOSPITAL | Age: 59
Discharge: HOME | End: 2024-11-06
Payer: COMMERCIAL

## 2024-11-06 VITALS
OXYGEN SATURATION: 97 % | HEART RATE: 74 BPM | SYSTOLIC BLOOD PRESSURE: 137 MMHG | DIASTOLIC BLOOD PRESSURE: 82 MMHG | RESPIRATION RATE: 16 BRPM | TEMPERATURE: 97.7 F

## 2024-11-06 DIAGNOSIS — R52 PAIN: ICD-10-CM

## 2024-11-06 DIAGNOSIS — M48.02 CERVICAL STENOSIS OF SPINE: ICD-10-CM

## 2024-11-06 DIAGNOSIS — M50.20 DISPLACEMENT OF CERVICAL INTERVERTEBRAL DISC WITHOUT MYELOPATHY: ICD-10-CM

## 2024-11-06 DIAGNOSIS — M54.12 CERVICAL RADICULOPATHY: ICD-10-CM

## 2024-11-06 PROCEDURE — 25010000002 METHYLPREDNISOLONE PER 80 MG: Performed by: ANESTHESIOLOGY

## 2024-11-06 PROCEDURE — 25010000002 LIDOCAINE PF 1% 1 % SOLUTION: Performed by: ANESTHESIOLOGY

## 2024-11-06 PROCEDURE — 25510000001 IOPAMIDOL 41 % SOLUTION: Performed by: ANESTHESIOLOGY

## 2024-11-06 PROCEDURE — 25010000002 BUPIVACAINE (PF) 0.25 % SOLUTION: Performed by: ANESTHESIOLOGY

## 2024-11-06 PROCEDURE — 77003 FLUOROGUIDE FOR SPINE INJECT: CPT

## 2024-11-06 RX ORDER — LIDOCAINE HYDROCHLORIDE 10 MG/ML
5 INJECTION, SOLUTION INFILTRATION; PERINEURAL ONCE
Status: COMPLETED | OUTPATIENT
Start: 2024-11-06 | End: 2024-11-06

## 2024-11-06 RX ORDER — BUPIVACAINE HYDROCHLORIDE 2.5 MG/ML
10 INJECTION, SOLUTION EPIDURAL; INFILTRATION; INTRACAUDAL ONCE
Status: COMPLETED | OUTPATIENT
Start: 2024-11-06 | End: 2024-11-06

## 2024-11-06 RX ORDER — METHYLPREDNISOLONE ACETATE 80 MG/ML
80 INJECTION, SUSPENSION INTRA-ARTICULAR; INTRALESIONAL; INTRAMUSCULAR; SOFT TISSUE ONCE
Status: COMPLETED | OUTPATIENT
Start: 2024-11-06 | End: 2024-11-06

## 2024-11-06 RX ORDER — IOPAMIDOL 408 MG/ML
12 INJECTION, SOLUTION INTRATHECAL
Status: COMPLETED | OUTPATIENT
Start: 2024-11-06 | End: 2024-11-06

## 2024-11-06 RX ORDER — SODIUM CHLORIDE 0.9 % (FLUSH) 0.9 %
10 SYRINGE (ML) INJECTION EVERY 12 HOURS SCHEDULED
Status: DISCONTINUED | OUTPATIENT
Start: 2024-11-06 | End: 2024-11-07 | Stop reason: HOSPADM

## 2024-11-06 RX ADMIN — LIDOCAINE HYDROCHLORIDE 5 ML: 10 INJECTION, SOLUTION EPIDURAL; INFILTRATION; INTRACAUDAL; PERINEURAL at 09:03

## 2024-11-06 RX ADMIN — BUPIVACAINE HYDROCHLORIDE 10 ML: 2.5 INJECTION, SOLUTION EPIDURAL; INFILTRATION; INTRACAUDAL; PERINEURAL at 09:03

## 2024-11-06 RX ADMIN — IOPAMIDOL 10 ML: 408 INJECTION, SOLUTION INTRATHECAL at 09:03

## 2024-11-06 RX ADMIN — METHYLPREDNISOLONE ACETATE 80 MG: 80 INJECTION, SUSPENSION INTRA-ARTICULAR; INTRALESIONAL; INTRAMUSCULAR; SOFT TISSUE at 09:03

## 2024-11-06 NOTE — H&P
Brief Pre-procedural / Pre-operative H&P        -----    Pertinent Diagnosis:   Right cervical radiculopathy    Proposed Procedure: Cervical epidural steroid injection, anticipated C5-C6      Subjective   Colby MURALI Alvarado is a 58 y.o. male  who presents for intervention.  He has a history of neck pain and a right greater than left cervical radiculopathy, history of cervical spinal stenosis with significant neuroforaminal stenosis.      History of Present Illness     He had 60% relief for 6 months after previous cervical epidural like to have another 1.  Based on the significant therapeutic response previously repeat epidural is indicated    He has had an increase again in aching and burning pain and there is radiation down the neck and down the right arm and all the way down to the right hand.  He has some history of back pain to the back starting more than the neck.  He has known foraminal narrowing especially at C5-C6 and had an MRI about 6 weeks ago and the foraminal narrowing at C5-C6 had worsened since previous study.    -------    The following portions of the patient's history were reviewed and updated as appropriate: allergies, current medications, past family history, past medical history, past social history, past surgical history and problem list.    No Known Allergies      Current Outpatient Medications:     HYDROcodone-acetaminophen (NORCO)  MG per tablet, Take 1 tablet every 4-6 hours as needed for pain. Max 5/day. 30 day supply. DNF 10/29/24, Disp: 150 tablet, Rfl: 0    meloxicam (MOBIC) 15 MG tablet, TAKE 1 TABLET BY MOUTH DAILY, Disp: 30 tablet, Rfl: 1    atorvastatin (LIPITOR) 80 MG tablet, Take 1 tablet by mouth Every Night., Disp: , Rfl:     cetirizine (zyrTEC) 10 MG tablet, Take 1 tablet by mouth Daily., Disp: , Rfl:     cyclobenzaprine (FLEXERIL) 10 MG tablet, TAKE 1 TABLET BY MOUTH TWICE DAILY AS NEEDED FOR MUSCLE SPASMS, Disp: 60 tablet, Rfl: 2    Galcanezumab-gnlm (Emgality, 300 MG  Dose,) 100 MG/ML solution prefilled syringe, As Needed., Disp: , Rfl:     lisinopril (PRINIVIL,ZESTRIL) 10 MG tablet, Take 1 tablet by mouth 2 (two) times a day., Disp: 60 tablet, Rfl: 6    metoprolol succinate XL (TOPROL-XL) 100 MG 24 hr tablet, TAKE 1 TABLET BY MOUTH EVERY DAY. CAN TAKE AN EXTRA 1/2 TABLET DAILY AS NEEDED, Disp: 135 tablet, Rfl: 2    SUMAtriptan Succinate (IMITREX) 6 MG/0.5ML injection, sumatriptan 6 mg/0.5 mL subcutaneous pen injector, Disp: , Rfl:     zolpidem (AMBIEN) 10 MG tablet, Take 1 tablet by mouth At Night As Needed for Sleep., Disp: , Rfl:     Current Facility-Administered Medications:     bupivacaine (PF) (MARCAINE) 0.25 % injection 10 mL, 10 mL, Injection, Once, Cruz Aguayo MD    iopamidol (ISOVUE-M 200) injection 41%, 12 mL, Epidural, Once in imaging, Cruz Aguayo MD    lidocaine (XYLOCAINE) 1 % injection 5 mL, 5 mL, Infiltration, Once, Cruz Aguayo MD    methylPREDNISolone acetate (DEPO-medrol) injection 80 mg, 80 mg, Epidural, Once, Cruz Aguayo MD    sodium chloride 0.9 % flush 10 mL, 10 mL, Intravenous, Q12H, Cruz Aguayo MD    Current Outpatient Medications on File Prior to Encounter   Medication Sig Dispense Refill    HYDROcodone-acetaminophen (NORCO)  MG per tablet Take 1 tablet every 4-6 hours as needed for pain. Max 5/day. 30 day supply. DNF 10/29/24 150 tablet 0    meloxicam (MOBIC) 15 MG tablet TAKE 1 TABLET BY MOUTH DAILY 30 tablet 1    atorvastatin (LIPITOR) 80 MG tablet Take 1 tablet by mouth Every Night.      cetirizine (zyrTEC) 10 MG tablet Take 1 tablet by mouth Daily.      cyclobenzaprine (FLEXERIL) 10 MG tablet TAKE 1 TABLET BY MOUTH TWICE DAILY AS NEEDED FOR MUSCLE SPASMS 60 tablet 2    Galcanezumab-gnlm (Emgality, 300 MG Dose,) 100 MG/ML solution prefilled syringe As Needed.      lisinopril (PRINIVIL,ZESTRIL) 10 MG tablet Take 1 tablet by mouth 2 (two) times a day. 60 tablet 6    metoprolol succinate XL (TOPROL-XL) 100 MG 24  hr tablet TAKE 1 TABLET BY MOUTH EVERY DAY. CAN TAKE AN EXTRA 1/2 TABLET DAILY AS NEEDED 135 tablet 2    SUMAtriptan Succinate (IMITREX) 6 MG/0.5ML injection sumatriptan 6 mg/0.5 mL subcutaneous pen injector      zolpidem (AMBIEN) 10 MG tablet Take 1 tablet by mouth At Night As Needed for Sleep.       No current facility-administered medications on file prior to encounter.         * No active hospital problems. *       Past Medical History:   Diagnosis Date    Anxiety     Arthritis of back     Atrial fibrillation     Cervical disc disorder 2019    Chronic pain     Chronic pain disorder 2000    Cluster headache 5-    Depression     Extremity pain ?    H/O cluster headache     Hearing loss     Hyperlipidemia     Hypertension     Insomnia     Joint pain     Low back pain 2021    Lumbosacral disc disease ?    Neck pain 2019    CAL on CPAP 11/23/2020    Home sleep study.  KALEIGH 6.4 events per hour.  Low oxygen saturation 84%    Periarthritis of shoulder     Seasonal allergies        Past Surgical History:   Procedure Laterality Date    BACK SURGERY      CARDIAC ABLATION  07/31/2019    CERVICAL EPIDURAL Bilateral 04/05/2022    Procedure: CERVICAL EPIDURAL;  Surgeon: Cruz Aguayo MD;  Location: [facility];  Service: Pain Management;  Laterality: Bilateral;    CERVICAL EPIDURAL N/A 05/19/2022    Procedure: CERVICAL EPIDURAL;  Surgeon: Cruz Aguayo MD;  Location: [facility];  Service: Pain Management;  Laterality: N/A;    CERVICAL EPIDURAL N/A 11/28/2023    Procedure: CERVICAL EPIDURAL STEROID INJECTION CPT: 90025;  Surgeon: Cruz Aguayo MD;  Location: [facility];  Service: Pain Management;  Laterality: N/A;    EPIDURAL Bilateral 08/23/2022    Procedure: L4 TRANSFORAMINAL LUMBAR EPIDURAL STEROID INJECTION;  Surgeon: Cruz Aguayo MD;  Location: [facility];  Service: Pain Management;  Laterality: Bilateral;    EYE MUSCLE SURGERY Left     HAND SURGERY Right     open fracture plate     HARDWARE REMOVAL Right     rt hand  plate    HERNIA REPAIR      MEDIAL BRANCH BLOCK Bilateral 11/10/2022    Procedure: MEDIAL BRANCH BLOCK LUMBAR bilateral L3-L5;  Surgeon: Cruz Aguayo MD;  Location: [facility];  Service: Pain Management;  Laterality: Bilateral;    MEDIAL BRANCH BLOCK Bilateral 03/16/2023    Procedure: BILATEAL L3-L5 LUMBAR MEDIAL BRANCH BLOCK;  Surgeon: Cruz Aguayo MD;  Location: [facility];  Service: Pain Management;  Laterality: Bilateral;    NASAL FRACTURE SURGERY      RADIOFREQUENCY ABLATION Bilateral 05/11/2023    Procedure: BILATERAL L3-L5 RADIOFREQUENCY ABLATION LUMBAR CPT: 48671, 92852;  Surgeon: Cruz Aguayo MD;  Location: [facility];  Service: Pain Management;  Laterality: Bilateral;    SHOULDER ARTHROSCOPY W/ ROTATOR CUFF REPAIR Right 03/13/2019    Procedure: SHOULDER ARTHROSCOPY, DEBRIDEIMENT OF LABRUM AND SUBSCAP, DECEOMPRESSION, DISTAL CLAVICLE EXCISION,  WITH MINI OPEN ROTATOR CUFF REPAIR;  Surgeon: Estela Townsend MD;  Location: [facility];  Service: Orthopedics    SHOULDER SURGERY      SPINE SURGERY      VASECTOMY         Family History   Problem Relation Age of Onset    Emphysema Mother     Hypertension Mother     Heart disease Mother     Cancer Mother         Oral    COPD Mother     Migraines Mother     Hypertension Sister     No Known Problems Brother     Heart attack Maternal Grandmother     Emphysema Maternal Grandmother     Heart disease Maternal Grandmother     Stroke Maternal Grandmother     Heart attack Maternal Grandfather     Emphysema Maternal Grandfather     Sleep apnea Other     Hypertension Other     Heart disease Other     Lung disease Other     Malig Hyperthermia Neg Hx        Social History     Socioeconomic History    Marital status:    Tobacco Use    Smoking status: Some Days     Current packs/day: 1.00     Average packs/day: 1 pack/day for 41.8 years (41.8 ttl pk-yrs)     Types: Cigarettes     Start date: 1983      Passive exposure: Current    Smokeless tobacco: Former     Types: Snuff   Vaping Use    Vaping status: Some Days    Substances: Nicotine    Devices: Pre-filled or refillable cartridge   Substance and Sexual Activity    Alcohol use: Not Currently     Alcohol/week: 16.0 standard drinks of alcohol     Types: 16 Cans of beer per week     Comment: caffiene    Drug use: No    Sexual activity: Yes     Partners: Female     Birth control/protection: Vasectomy       -------       Review of Systems  No Fever, No Chills, No ear pain, No sinus pressure or drainage, No eye pain or drainage, No cough, No SOB, No chest tightness nor chest pain, no palpitations.          Vitals:    11/06/24 0818   BP: 146/77   BP Location: Left arm   Patient Position: Sitting   Pulse: 76   Resp: 16   Temp: 97.7 °F (36.5 °C)   TempSrc: Infrared   SpO2: 97%         Objective   Physical Exam  VSS, NNR, NCAT, NMNA, NRD, AAOx3.  Mp3  Spurling rt pos      -------    Assessment & Plan:  - as noted above, the stated intervention is indicated  - Follow-up plan will be noted in the operative report        Asa2  No sed      EMR Dragon/Transcription disclaimer:   Typed items in this encounter note may have been created by electronic transcription/translation software which converts spoken language to printed text. The electronic translation of spoken language may permit erroneous, or at times, nonsensical words or phrases to be inadvertently transcribed; Although I have reviewed the note for such errors, some may still exist.

## 2024-11-06 NOTE — PROCEDURES
Procedures    Cervical Epidural Steroid Injection @ C5-C6  [facility]    PREOPERATIVE DIAGNOSIS:  Cervical Spinal Stenosis and Right Cervical Radiculopathy  POSTOPERATIVE DIAGNOSIS:  Same as preop diagnosis    PROCEDURE:   Cervical Epidural Steroid Injection, Therapeutic Translaminar Injection, with epidurogram, at  C5-C6 level    PRE-PROCEDURE DISCUSSION WITH PATIENT:    Risks and complications were discussed with the patient prior to starting the procedure and informed consent was obtained.  We discussed various topics including but not limited to bleeding, infection, injury, paralysis, nerve injury, dural puncture, coma, death, worsening of clinical picture, lack of pain relief, and postprocedural soreness.    Preprocedure assessment/presedation assessment is noted as the H&P/H&P update as part of this Epic chart encounter.  Preassessment plan and preprocedure airway assessment are noted.  Immediate in the room airway assessment is unchanged from the preprocedure assessment performed in the preoperative area a few minutes ago.      SURGEON:  Cruz Aguayo MD    REASON FOR PROCEDURE:   Previous clinically significant therapeutic effect is noted., Degenerative changes are noted in the area., Stenotic area is noted, and is likely contributing to this chronic &/or recurrent pain. , and Radiating pattern of pain is likely consistent with degenerative changes in the area.    SEDATION:  Patient declined administration of moderate sedation   and , but an IV access was obtained for safety.  ANESTHETIC:  Marcaine 0.25%  STEROID:   Methylprednisolone (DEPO MEDROL) 80mg/ml    DESCRIPTON OF PROCEDURE:    After obtaining informed consent, I.V. was started in the preop area.   The patient was taken to the operating room and placed in the prone position.  EKG, blood pressure, and pulse oximeter were monitored throughout, by the RN under my guidance. All pressure points were well padded.  The cervicothoracic spine  area was prepped with Chloraprep and draped in a sterile fashion.  Under fluoroscopic guidance, the aforementioned interlaminar space was identified. Skin and subcutaneous tissues were anesthetized with 1% lidocaine in the middle of the space. A Tuohy needle was introduced through the skin and advanced to this interlaminar space and into the epidural space under fluoroscopic guidance and verified with loss-of-resistance technique to air.  After confirming the position of the needle with the fluoroscope with all the views, and after aspiration was confirmed negative for blood and CSF, 1.5 mL of Omnipaque was injected.  After seeing appropriate epidurogram with lateral and PA views, a total of 3 cc solution was injected, consisting of 1cc of local anesthetic as above, with normal saline and injectable steroid as above.     ESTIMATED BLOOD LOSS:  <5 mL  SPECIMENS:  None    COMPLICATIONS:     No complications were noted., There was no indication of vascular uptake on live injection of contrast dye., and There was no indication of intrathecal uptake on live injection of contrast dye.    TOLERANCE & DISCHARGE CONDITION:    The patient tolerated the procedure well.  The patient was transported to the recovery area without difficulties.  The patient was discharged to home under the care of family in stable and satisfactory condition.    PLAN OF CARE:  The patient was given our standard instruction sheet.  The patient will Return to clinic 3-4 wks.  The patient will resume all medications as per the medication reconciliation sheet.

## 2024-11-06 NOTE — DISCHARGE INSTRUCTIONS
[facility] - Post-procedure Instructions          --  While there are no absolute restrictions, it is recommended that you do not perform strenuous activity today. In the morning, you may resume your level of activity as before your block.    --  If you have a band-aid at your injection site, please remove it later today. Observe the area for any redness, swelling, pus-like drainage, or a temperature over 101°. If any of these symptoms occur, please call your doctor at [phone]. If after office hours, leave a message and the on-call provider will return your call.    --  Ice may be applied to your injection site. It is recommended you avoid direct heat (heating pad; hot tub) for 1-2 days.    --  Call [facility] at [phone] if you experience persistent headache, persistent bleeding from the injection site, or severe pain not relieved by heat or oral medication.    --  Do not make important decisions today.    --  Due to the effects of the block and/or the I.V. Sedation, DO NOT drive or operate hazardous machinery for 12 hours.  Local anesthetics may cause numbness after procedure and precautions must be taken with regards to operating equipment as well as with walking, even if ambulating with assistance of another person or with an assistive device.    --  Do not drink alcohol for 12 hours.    -- You may return to work tomorrow, or as directed by your referring doctor.    --  Occasionally you may notice a slight increase in your pain after the procedure. This should start to improve within the next 24-48 hours. Radiofrequency ablation procedure pain may last 3-4 weeks.    --  It may take as long as 3-4 days before you notice a gradual improvement in your pain and/or other symptoms.    -- You may continue to take your prescribed pain medication as needed.    --  Some normal possible side effects of steroid use could include fluid retention, increased blood sugar, dull headache,  increased sweating, increased appetite, mood swings and flushing.    --  Diabetics are recommended to watch their blood glucose level closely for 24-48 hours after the injection.    --  Must stay in PACU for 20 min upon arrival and prove no leg weakness before being discharged.    --  IN THE EVENT OF A LIFE THREATENING EMERGENCY, (CHEST PAIN, BREATHING DIFFICULTIES, PARALYSIS…) YOU SHOULD GO TO YOUR NEAREST EMERGENCY ROOM.    --  You should be contacted by our office within 2-3 days to schedule follow up or next appointment date.  If not contacted within 7 days, please call the office at [phone]

## 2024-11-07 DIAGNOSIS — M25.512 CHRONIC LEFT SHOULDER PAIN: ICD-10-CM

## 2024-11-07 DIAGNOSIS — G89.29 CHRONIC LEFT SHOULDER PAIN: ICD-10-CM

## 2024-11-07 DIAGNOSIS — M51.369 DDD (DEGENERATIVE DISC DISEASE), LUMBAR: ICD-10-CM

## 2024-11-07 RX ORDER — MELOXICAM 15 MG/1
15 TABLET ORAL DAILY
Qty: 30 TABLET | Refills: 0 | Status: SHIPPED | OUTPATIENT
Start: 2024-11-07

## 2024-11-25 ENCOUNTER — PREP FOR SURGERY (OUTPATIENT)
Dept: SURGERY | Facility: SURGERY CENTER | Age: 59
End: 2024-11-25
Payer: COMMERCIAL

## 2024-11-25 ENCOUNTER — TRANSCRIBE ORDERS (OUTPATIENT)
Dept: SURGERY | Facility: SURGERY CENTER | Age: 59
End: 2024-11-25
Payer: COMMERCIAL

## 2024-11-25 ENCOUNTER — OFFICE VISIT (OUTPATIENT)
Dept: PAIN MEDICINE | Facility: CLINIC | Age: 59
End: 2024-11-25
Payer: COMMERCIAL

## 2024-11-25 VITALS
SYSTOLIC BLOOD PRESSURE: 135 MMHG | TEMPERATURE: 97.6 F | BODY MASS INDEX: 29.6 KG/M2 | OXYGEN SATURATION: 96 % | WEIGHT: 206.8 LBS | DIASTOLIC BLOOD PRESSURE: 79 MMHG | HEIGHT: 70 IN | HEART RATE: 83 BPM | RESPIRATION RATE: 18 BRPM

## 2024-11-25 DIAGNOSIS — M54.12 CERVICAL RADICULOPATHY: ICD-10-CM

## 2024-11-25 DIAGNOSIS — M50.20 DISPLACEMENT OF CERVICAL INTERVERTEBRAL DISC WITHOUT MYELOPATHY: ICD-10-CM

## 2024-11-25 DIAGNOSIS — Z79.1 NSAID LONG-TERM USE: ICD-10-CM

## 2024-11-25 DIAGNOSIS — M62.838 MUSCLE SPASM: ICD-10-CM

## 2024-11-25 DIAGNOSIS — G89.29 CHRONIC BILATERAL LOW BACK PAIN WITHOUT SCIATICA: ICD-10-CM

## 2024-11-25 DIAGNOSIS — G89.29 CHRONIC BILATERAL LOW BACK PAIN WITHOUT SCIATICA: Primary | ICD-10-CM

## 2024-11-25 DIAGNOSIS — M51.369 DDD (DEGENERATIVE DISC DISEASE), LUMBAR: ICD-10-CM

## 2024-11-25 DIAGNOSIS — M54.50 CHRONIC BILATERAL LOW BACK PAIN WITHOUT SCIATICA: ICD-10-CM

## 2024-11-25 DIAGNOSIS — Z41.9 SURGERY, ELECTIVE: Primary | ICD-10-CM

## 2024-11-25 DIAGNOSIS — M54.50 CHRONIC BILATERAL LOW BACK PAIN WITHOUT SCIATICA: Primary | ICD-10-CM

## 2024-11-25 DIAGNOSIS — M51.360 DEGENERATION OF INTERVERTEBRAL DISC OF LUMBAR REGION WITH DISCOGENIC BACK PAIN: Primary | ICD-10-CM

## 2024-11-25 DIAGNOSIS — Z79.899 HIGH RISK MEDICATION USE: ICD-10-CM

## 2024-11-25 PROCEDURE — 99214 OFFICE O/P EST MOD 30 MIN: CPT

## 2024-11-25 PROCEDURE — 80305 DRUG TEST PRSMV DIR OPT OBS: CPT

## 2024-11-25 RX ORDER — HYDROCODONE BITARTRATE AND ACETAMINOPHEN 10; 325 MG/1; MG/1
TABLET ORAL
Qty: 150 TABLET | Refills: 0 | Status: SHIPPED | OUTPATIENT
Start: 2024-11-25

## 2024-11-25 RX ORDER — SODIUM CHLORIDE 0.9 % (FLUSH) 0.9 %
10 SYRINGE (ML) INJECTION AS NEEDED
OUTPATIENT
Start: 2024-11-25

## 2024-11-25 RX ORDER — SODIUM CHLORIDE 0.9 % (FLUSH) 0.9 %
10 SYRINGE (ML) INJECTION EVERY 12 HOURS SCHEDULED
OUTPATIENT
Start: 2024-11-25

## 2024-11-25 NOTE — PROGRESS NOTES
CHIEF COMPLAINT  Back and neck pain     Subjective   Colby MURALI Alvarado is a 58 y.o. male  who presents to the office for follow-up of procedure.  He completed a C5-C6 SHAY on  11/6/24 performed by Dr. Aguayo for management of neck pain. Patient reports 50% ongoing relief from the procedure. He notes improved ROM and increased activity since the procedure.     Today pain is 5/10VAS in severity. Pain is located in his neck, lower back, and bilateral hips. His main complaint today is axial low back pain that radiates across his low back in a bandlike pattern. Describes this pain as an intermittent aching and burning. Pain is worsened by certain movements, prolonged positions (standing), bending/twisting, and walking long distances. Pain improves with rest, reposition, and medication. Back pain is stable at this time.      Continues with Hydrocodone 10mg 5/day, Flexeril 10mg PRN, and Meloxicam 15mg daily. Denies any side effects from the regimen, including constipation and somnolence. The regimen helps decrease pain by a moderate amount. He works as a  and this helps gets through the day. ADL's by self. Denies any bowel or bladder changes.      Followed by Dr. Estela Townsend on 2/9/24 for left shoulder pain.     Procedures:  11/6/24 - C5-C6 SHAY - 50% ongoing relief  11/28/23 - C5-C6 SHAY - 60% relief for over 6 months   5/11/23 - Bilateral L3-L5 RFA - 50% ongoing relief  3/16/23 - Bilateral L3-L5 MBB - 80% relief x 24 hours  11/10/22 - bilateral L3-L5 MBB - 80% relief x a few hours  8/23/22 - bilateral L4 LTFESI - 90% relief of radicular symptoms - no relief of back pain  5/19/22 - SHAY - 50% ongoing relief  4/5/22 - SHAY - 80% relief x a couple of weeks     He has a history of lumbar spine surgery 15 years ago with Dr. Moon.      Back Pain  The current episode started more than 1 month ago. The problem occurs daily. The problem is unchanged. The pain is present in the lumbar spine. The quality of  the pain is described as aching and burning. The pain radiates to the left thigh and right thigh (bilateral hips, lateral legs). The pain is at a severity of 7/10. The symptoms are aggravated by lying down, standing and sitting (work, prolonged position, lifting). Pertinent negatives include no abdominal pain, chest pain, dysuria, fever, headaches, numbness or weakness. He has tried NSAIDs and analgesics for the symptoms. The treatment provided mild relief.   Neck Pain   This is a chronic problem. The current episode started more than 1 year ago. The problem occurs daily. The problem has been gradually improving. The pain is present in the left side, midline and right side. The quality of the pain is described as aching and burning. The pain is at a severity of 5/10 (worse today due to right shoulder pain). The symptoms are aggravated by position and twisting. Pertinent negatives include no chest pain, fever, headaches, numbness or weakness. He has tried NSAIDs, acetaminophen, neck support, muscle relaxants and oral narcotics for the symptoms. The treatment provided mild relief.      PEG Assessment   What number best describes your pain on average in the past week?6  What number best describes how, during the past week, pain has interfered with your enjoyment of life?6  What number best describes how, during the past week, pain has interfered with your general activity?  6    Review of Pertinent Medical Data ---  MRI OF THE LUMBAR SPINE WITH AND WITHOUT CONTRAST     CLINICAL HISTORY: Degenerative disc disease. Bilateral leg pain. History  of L3-L4 discectomy.     TECHNIQUE: MRI of the lumbar spine was obtained with sagittal pre and  postgadolinium T1, proton-density, and T2-weighted images. Additionally,  there are axial pre and postgadolinium T1 and T2-weighted images through  the lumbar spine.     COMPARISON: Comparison is made to previous outside MRI of the lumbar  spine from [facility] and Open MRI dated  01/09/2013.     FINDINGS:     The conus medullaris terminates at the T12-L1 level and has normal  signal intensity. The visualized distal thoracic spinal cord is  unremarkable.     At T12-L1, there is no significant canal or foraminal stenosis.     At L1-L2, there are degenerative disc changes with adjacent degenerative  endplate marrow edema. Bulging disc material at L1-L2 results in a mild  degree of canal and foraminal narrowing. All of these changes are new  when compared to the prior exam.     At L2-L3, there is a disc bulge which mildly narrows the neural foramina  bilaterally. No significant canal stenosis is identified. The mild  foraminal narrowing is new when compared to the prior exam. A right  posterior lateral annular fissure is identified and new since the prior  study.     At the L3-L4 level, the patient has undergone an interval left  laminectomy and partial facetectomy procedure. There is a disc bulge  with a right posterior lateral annular fissure that is eccentric to the  left and results in a mild degree of canal narrowing, particularly along  the left side of the spinal canal. On the prior study, there was a left  central disc protrusion which is no longer evident. Bulging disc  material also results in mild foraminal narrowing. Although the left  central disc protrusion is no longer evident, the degree of canal  narrowing is mildly improved with resection of the left central disc  protrusion. The degree of foraminal narrowing is stable.     At L4-L5, there are degenerative disc changes with adjacent degenerative  endplate marrow edema. There is degenerative retrolisthesis of L4 on L5  by approximately 3 mm. There is a new posterior annular fissure at  L4-L5. Bulging disc material at L4-L5 results in a mild-to-moderate  degree of bilateral foraminal narrowing. Mild canal narrowing and left  lateral recess narrowing is appreciated secondary to a disc bulge. When  compared to the prior study, the  degenerative disc changes and  degenerative endplate marrow edema is new. The mild degree of canal  narrowing and mild-to-moderate degree of foraminal narrowing is slightly  more pronounced when compared to the prior study.     At L5-S1, there is a disc osteophyte complex which mildly narrows the  neural foramina and these findings are unchanged. A posterior annular  fissure is noted and is new since the prior study.     IMPRESSION:     When compared to the prior exam dated 01/09/2013, at L1-L2, there are  new degenerative disc changes with new adjacent degenerative endplate  marrow edema. Mild canal and foraminal narrowing at the L1-L2 level is  also new.     In the interval since the prior study, the patient has undergone a left  laminectomy and partial facetectomy procedure at the L3-L4 level. There  is a disc bulge eccentric to the left at L3-L4 which results in a mild  degree of canal and foraminal narrowing. The degree of canal narrowing  is mildly improved in the interval with interval resection of the  previously identified left central disc protrusion. The degree of mild  foraminal narrowing at L3-L4 is stable.     At L4-L5, there is progression of the degenerative disc change with new  areas of degenerative endplate marrow edema. Degenerative retrolisthesis  of L4 on L5 by approximately 3 mm is noted and is stable. A new  posterior annular fissure is seen at the L4-L5 level. Bulging disc  material results in a mild-to-moderate degree of bilateral foraminal  narrowing and a mild degree of canal narrowing which is more pronounced  when compared to the prior exam.     At L5-S1, a new posterior annular fissure is identified. There are  stable mild bilateral foraminal narrowing secondary to a disc osteophyte  complex.     The remaining degenerative phenomena within the lumbar spine are as  discussed in detail above.     This report was finalized on 7/6/2022 12:25 PM by Dr. Jose Harrell M.D.     The following  "portions of the patient's history were reviewed and updated as appropriate: allergies, current medications, past family history, past medical history, past social history, past surgical history, and problem list.    Review of Systems   Constitutional:  Negative for activity change, fatigue and fever.   Respiratory:  Negative for cough and chest tightness.    Cardiovascular:  Negative for chest pain.   Gastrointestinal:  Negative for abdominal pain, constipation and diarrhea.   Genitourinary:  Negative for dysuria.   Musculoskeletal:  Positive for back pain and neck pain.   Neurological:  Negative for dizziness, weakness, light-headedness, numbness and headaches.   Psychiatric/Behavioral:  Positive for sleep disturbance. Negative for agitation and suicidal ideas. The patient is not nervous/anxious.      I have reviewed and confirmed the accuracy of the ROS as documented by the MA/LPN/RN LUIS EDUARDO Leslie    Vitals:    11/25/24 0750   BP: 135/79   BP Location: Right arm   Patient Position: Sitting   Cuff Size: Large Adult   Pulse: 83   Resp: 18   Temp: 97.6 °F (36.4 °C)   TempSrc: Temporal   SpO2: 96%   Weight: 93.8 kg (206 lb 12.8 oz)   Height: 177.8 cm (70\")   PainSc:   5     Objective   Physical Exam  Constitutional:       Appearance: Normal appearance.   HENT:      Head: Normocephalic.   Cardiovascular:      Rate and Rhythm: Normal rate and regular rhythm.   Pulmonary:      Effort: Pulmonary effort is normal.      Breath sounds: Normal breath sounds.   Musculoskeletal:      Right shoulder: Tenderness and bony tenderness present. Decreased range of motion.      Left shoulder: Tenderness, bony tenderness and crepitus present. Decreased range of motion.      Cervical back: Normal range of motion. Tenderness present. No bony tenderness. No pain with movement.      Lumbar back: Tenderness (slight tenderness noted) and bony tenderness present. Decreased range of motion. Positive right straight leg raise test and " positive left straight leg raise test.      Right hip: Tenderness and bony tenderness present.   Skin:     General: Skin is warm and dry.      Capillary Refill: Capillary refill takes less than 2 seconds.   Neurological:      General: No focal deficit present.      Mental Status: He is alert and oriented to person, place, and time.   Psychiatric:         Mood and Affect: Mood normal.         Speech: Speech normal.         Behavior: Behavior normal.         Thought Content: Thought content normal.         Cognition and Memory: Cognition normal.       Assessment & Plan   Diagnoses and all orders for this visit:    1. Chronic bilateral low back pain without sciatica (Primary)    2. DDD (degenerative disc disease), lumbar  -     HYDROcodone-acetaminophen (NORCO)  MG per tablet; Take 1 tablet every 4-6 hours as needed for pain. Max 5/day. 30 day supply. DNF 11/27/24  Dispense: 150 tablet; Refill: 0    3. Displacement of cervical intervertebral disc without myelopathy  -     HYDROcodone-acetaminophen (NORCO)  MG per tablet; Take 1 tablet every 4-6 hours as needed for pain. Max 5/day. 30 day supply. DNF 11/27/24  Dispense: 150 tablet; Refill: 0    4. Cervical radiculopathy    5. Muscle spasm    6. High risk medication use    7. NSAID long-term use      Colby Alvarado reports a pain score of 5.  Given his pain assessment as noted, treatment options were discussed and the following options were decided upon as a follow-up plan to address the patient's pain: continuation of current treatment plan for pain and prescription for opiod analgesics.    --- Routine UDS in office today as part of monitoring requirements for controlled substances.  The specimen was viewed and the immunoassay result reviewed and is +OPI.  This specimen will be sent to [facility] for confirmation.     --- The patient signed an updated copy of the controlled substance agreement on 5/25/24  --- Continue Flexeril and Meloxicam. No  refill needed at this time.   --- Patient to reach out [facility] in [place] to see if they can release CMP results from 10/18/24  --- Continue Hydrocodone. DNF 11/27/24 (ok to fill this day due to holiday). Patient appears stable with current regimen. No adverse effects. Regarding continuation of opioids, there is no evidence of aberrant behavior or any red flags.  The patient continues with appropriate response to opioid therapy. ADL's remain intact by self.   --- Follow-up 2 months for medication management and for procedure    Thermal Radiofrequency Destruction  This repeat thermal radiofrequency destruction (RFA) of cervical, thoracic, or lumbar paravertebral facet joint (medial branch) nerves at the same anatomical site is considered medically reasonable and necessary as this patient has met the criteria of having a minimum of consistent 50% improvement in pain for at least six (6) months or at least 50% consistent improvement in the ability to perform previously painful movements and ADLs as compared to baseline measurement using the same scale.      CARI REPORT  As part of the patient's treatment plan, I am prescribing controlled substances. The patient has been made aware of appropriate use of such medications, including potential risk of somnolence, limited ability to drive and/or work safely, and the potential for dependence or overdose. It has also been made clear that these medications are for use by this patient only, without concomitant use of alcohol or other substances unless prescribed.     Patient has completed prescribing agreement detailing terms of continued prescribing of controlled substances, including monitoring CARI reports, urine drug screening, and pill counts if necessary. The patient is aware that inappropriate use will results in cessation of prescribing such medications.    As the clinician, I personally reviewed the CARI from 11/25/24 while the patient was in the office  today.    History and physical exam exhibit continued safe and appropriate use of controlled substances.    Dictated utilizing Dragon dictation.

## 2024-12-07 DIAGNOSIS — M51.369 DDD (DEGENERATIVE DISC DISEASE), LUMBAR: ICD-10-CM

## 2024-12-07 DIAGNOSIS — G89.29 CHRONIC LEFT SHOULDER PAIN: ICD-10-CM

## 2024-12-07 DIAGNOSIS — M25.512 CHRONIC LEFT SHOULDER PAIN: ICD-10-CM

## 2024-12-09 RX ORDER — MELOXICAM 15 MG/1
15 TABLET ORAL DAILY
Qty: 30 TABLET | Refills: 0 | Status: SHIPPED | OUTPATIENT
Start: 2024-12-09

## 2024-12-23 DIAGNOSIS — M50.20 DISPLACEMENT OF CERVICAL INTERVERTEBRAL DISC WITHOUT MYELOPATHY: ICD-10-CM

## 2024-12-23 DIAGNOSIS — M51.369 DDD (DEGENERATIVE DISC DISEASE), LUMBAR: ICD-10-CM

## 2024-12-23 RX ORDER — HYDROCODONE BITARTRATE AND ACETAMINOPHEN 10; 325 MG/1; MG/1
TABLET ORAL
Qty: 150 TABLET | Refills: 0 | Status: SHIPPED | OUTPATIENT
Start: 2024-12-23

## 2024-12-23 NOTE — TELEPHONE ENCOUNTER
Caller: NONA     Relationship: SELF    Best call back number: [phone] (home)       Requested Prescriptions:   Requested Prescriptions     Pending Prescriptions Disp Refills    HYDROcodone-acetaminophen (NORCO)  MG per tablet 150 tablet 0     Sig: Take 1 tablet every 4-6 hours as needed for pain. Max 5/day. 30 day supply. DNF 11/27/24        Pharmacy where request should be sent: [id number] - [address] [facility] [address]  & [address] [facility] [phone] [facility] [phone] [place]     Last office visit with prescribing clinician: 11/25/2024   Last telemedicine visit with prescribing clinician: Visit date not found   Next office visit with prescribing clinician: 1/24/2025     Additional details provided by patient:     Does the patient have less than a 3 day supply:  [] Yes  [x] No    Would you like a call back once the refill request has been completed: [x] Yes [] No    If the office needs to give you a call back, can they leave a voicemail: [x] Yes [] No    Belinda Centeno, PCT   12/23/24 09:11 EST

## 2025-01-09 DIAGNOSIS — M51.369 DDD (DEGENERATIVE DISC DISEASE), LUMBAR: ICD-10-CM

## 2025-01-09 DIAGNOSIS — M25.512 CHRONIC LEFT SHOULDER PAIN: ICD-10-CM

## 2025-01-09 DIAGNOSIS — G89.29 CHRONIC LEFT SHOULDER PAIN: ICD-10-CM

## 2025-01-09 DIAGNOSIS — M62.838 MUSCLE SPASM: ICD-10-CM

## 2025-01-09 RX ORDER — MELOXICAM 15 MG/1
15 TABLET ORAL DAILY
Qty: 30 TABLET | Refills: 0 | Status: SHIPPED | OUTPATIENT
Start: 2025-01-09

## 2025-01-09 RX ORDER — CYCLOBENZAPRINE HCL 10 MG
10 TABLET ORAL 2 TIMES DAILY PRN
Qty: 60 TABLET | Refills: 2 | Status: SHIPPED | OUTPATIENT
Start: 2025-01-09

## 2025-01-24 ENCOUNTER — TELEPHONE (OUTPATIENT)
Dept: FAMILY MEDICINE CLINIC | Facility: CLINIC | Age: 60
End: 2025-01-24
Payer: COMMERCIAL

## 2025-01-24 ENCOUNTER — PREP FOR SURGERY (OUTPATIENT)
Dept: SURGERY | Facility: SURGERY CENTER | Age: 60
End: 2025-01-24
Payer: COMMERCIAL

## 2025-01-24 ENCOUNTER — OFFICE VISIT (OUTPATIENT)
Dept: PAIN MEDICINE | Facility: CLINIC | Age: 60
End: 2025-01-24
Payer: COMMERCIAL

## 2025-01-24 ENCOUNTER — TRANSCRIBE ORDERS (OUTPATIENT)
Dept: SURGERY | Facility: SURGERY CENTER | Age: 60
End: 2025-01-24
Payer: COMMERCIAL

## 2025-01-24 VITALS
HEART RATE: 81 BPM | SYSTOLIC BLOOD PRESSURE: 124 MMHG | RESPIRATION RATE: 18 BRPM | WEIGHT: 199 LBS | BODY MASS INDEX: 28.49 KG/M2 | OXYGEN SATURATION: 98 % | DIASTOLIC BLOOD PRESSURE: 73 MMHG | TEMPERATURE: 95.5 F | HEIGHT: 70 IN

## 2025-01-24 DIAGNOSIS — M54.12 CERVICAL RADICULOPATHY: ICD-10-CM

## 2025-01-24 DIAGNOSIS — M54.16 LEFT LUMBAR RADICULOPATHY: ICD-10-CM

## 2025-01-24 DIAGNOSIS — M48.062 LUMBAR STENOSIS WITH NEUROGENIC CLAUDICATION: ICD-10-CM

## 2025-01-24 DIAGNOSIS — M50.20 DISPLACEMENT OF CERVICAL INTERVERTEBRAL DISC WITHOUT MYELOPATHY: ICD-10-CM

## 2025-01-24 DIAGNOSIS — Z79.899 HIGH RISK MEDICATION USE: ICD-10-CM

## 2025-01-24 DIAGNOSIS — M48.062 LUMBAR STENOSIS WITH NEUROGENIC CLAUDICATION: Primary | ICD-10-CM

## 2025-01-24 DIAGNOSIS — M54.16 LUMBAR RADICULITIS: ICD-10-CM

## 2025-01-24 DIAGNOSIS — Z41.9 SURGERY, ELECTIVE: Primary | ICD-10-CM

## 2025-01-24 DIAGNOSIS — M54.16 LUMBAR RADICULITIS: Primary | ICD-10-CM

## 2025-01-24 DIAGNOSIS — M51.369 DDD (DEGENERATIVE DISC DISEASE), LUMBAR: ICD-10-CM

## 2025-01-24 DIAGNOSIS — M62.838 MUSCLE SPASM: ICD-10-CM

## 2025-01-24 PROCEDURE — 99214 OFFICE O/P EST MOD 30 MIN: CPT

## 2025-01-24 RX ORDER — TOPIRAMATE 50 MG/1
100 TABLET, FILM COATED ORAL NIGHTLY
COMMUNITY
Start: 2025-01-16

## 2025-01-24 RX ORDER — HYDROCODONE BITARTRATE AND ACETAMINOPHEN 10; 325 MG/1; MG/1
TABLET ORAL
Qty: 150 TABLET | Refills: 0 | Status: SHIPPED | OUTPATIENT
Start: 2025-01-24

## 2025-01-24 NOTE — PROGRESS NOTES
CHIEF COMPLAINT  Neck and back pain    Subjective   Colby Alvarado is a 59 y.o. male  who presents for follow-up.  He has a history of chronic back and neck pain. He reports that his pain has remained consistent since his last office visit.    Today pain is 5/10VAS in severity. Pain is located in his neck, lower back, and bilateral hips. His main complaint today is axial low back pain that radiates down left lateral leg terminating mid calf.  Describes this pain as an intermittent aching and burning. He notes intermittent tingling to left anterior thigh. Pain is worsened by certain movements, prolonged positions (standing), bending/twisting, and walking long distances. Pain improves with rest, reposition, and medication. Neck pain is stable at this time.      Continues with Hydrocodone 10mg 5/day, Flexeril 10mg PRN, and Meloxicam 15mg daily. Denies any side effects from the regimen, including constipation and somnolence. The regimen helps decrease pain by a moderate amount. He works as a  and this helps gets through the day. ADL's by self. Denies any bowel or bladder changes.      Followed by Dr. Estela Townsend on 2/9/24 for left shoulder pain.    He was scheduled for on 1/16/25 for a repeat lumbar RFA but had to cancel this due to the flu.      Procedures:  11/6/24 - C5-C6 SHAY - 50% ongoing relief  11/28/23 - C5-C6 SHAY - 60% relief for over 6 months   5/11/23 - Bilateral L3-L5 RFA - 50% ongoing relief  3/16/23 - Bilateral L3-L5 MBB - 80% relief x 24 hours  11/10/22 - bilateral L3-L5 MBB - 80% relief x a few hours  8/23/22 - bilateral L4 LTFESI - 90% relief of radicular symptoms - no relief of back pain  5/19/22 - SHAY - 50% ongoing relief  4/5/22 - SHAY - 80% relief x a couple of weeks     He has a history of lumbar spine surgery 15 years ago with Dr. Moon.     Back Pain  The current episode started more than 1 month ago. The problem occurs daily. The problem is unchanged. The pain is  present in the lumbar spine. The quality of the pain is described as aching and burning. The pain radiates to the left thigh and right thigh (bilateral hips, lateral legs). The pain is at a severity of 6/10. The symptoms are aggravated by lying down, standing and sitting (work, prolonged position, lifting). Associated symptoms include headaches and numbness (left leg). Pertinent negatives include no abdominal pain, chest pain, dysuria, fever or weakness. He has tried NSAIDs and analgesics for the symptoms. The treatment provided mild relief.   Neck Pain   This is a chronic problem. The current episode started more than 1 year ago. The problem occurs daily. The problem has been gradually improving. The pain is present in the left side, midline and right side. The quality of the pain is described as aching and burning. The pain is at a severity of 5/10 (worse today due to right shoulder pain). The symptoms are aggravated by position and twisting. Associated symptoms include headaches and numbness (left leg). Pertinent negatives include no chest pain, fever or weakness. He has tried NSAIDs, acetaminophen, neck support, muscle relaxants and oral narcotics for the symptoms. The treatment provided mild relief.      PEG Assessment   What number best describes your pain on average in the past week?8  What number best describes how, during the past week, pain has interfered with your enjoyment of life?6  What number best describes how, during the past week, pain has interfered with your general activity?  6    Review of Pertinent Medical Data ---  MRI OF THE LUMBAR SPINE WITH AND WITHOUT CONTRAST     CLINICAL HISTORY: Degenerative disc disease. Bilateral leg pain. History  of L3-L4 discectomy.     TECHNIQUE: MRI of the lumbar spine was obtained with sagittal pre and  postgadolinium T1, proton-density, and T2-weighted images. Additionally,  there are axial pre and postgadolinium T1 and T2-weighted images through  the lumbar  spine.     COMPARISON: Comparison is made to previous outside MRI of the lumbar  spine from [facility] and [facility] dated 01/09/2013.     FINDINGS:     The conus medullaris terminates at the T12-L1 level and has normal  signal intensity. The visualized distal thoracic spinal cord is  unremarkable.     At T12-L1, there is no significant canal or foraminal stenosis.     At L1-L2, there are degenerative disc changes with adjacent degenerative  endplate marrow edema. Bulging disc material at L1-L2 results in a mild  degree of canal and foraminal narrowing. All of these changes are new  when compared to the prior exam.     At L2-L3, there is a disc bulge which mildly narrows the neural foramina  bilaterally. No significant canal stenosis is identified. The mild  foraminal narrowing is new when compared to the prior exam. A right  posterior lateral annular fissure is identified and new since the prior  study.     At the L3-L4 level, the patient has undergone an interval left  laminectomy and partial facetectomy procedure. There is a disc bulge  with a right posterior lateral annular fissure that is eccentric to the  left and results in a mild degree of canal narrowing, particularly along  the left side of the spinal canal. On the prior study, there was a left  central disc protrusion which is no longer evident. Bulging disc  material also results in mild foraminal narrowing. Although the left  central disc protrusion is no longer evident, the degree of canal  narrowing is mildly improved with resection of the left central disc  protrusion. The degree of foraminal narrowing is stable.     At L4-L5, there are degenerative disc changes with adjacent degenerative  endplate marrow edema. There is degenerative retrolisthesis of L4 on L5  by approximately 3 mm. There is a new posterior annular fissure at  L4-L5. Bulging disc material at L4-L5 results in a mild-to-moderate  degree of bilateral foraminal narrowing. Mild canal  narrowing and left  lateral recess narrowing is appreciated secondary to a disc bulge. When  compared to the prior study, the degenerative disc changes and  degenerative endplate marrow edema is new. The mild degree of canal  narrowing and mild-to-moderate degree of foraminal narrowing is slightly  more pronounced when compared to the prior study.     At L5-S1, there is a disc osteophyte complex which mildly narrows the  neural foramina and these findings are unchanged. A posterior annular  fissure is noted and is new since the prior study.     IMPRESSION:  When compared to the prior exam dated 01/09/2013, at L1-L2, there are  new degenerative disc changes with new adjacent degenerative endplate  marrow edema. Mild canal and foraminal narrowing at the L1-L2 level is  also new.     In the interval since the prior study, the patient has undergone a left  laminectomy and partial facetectomy procedure at the L3-L4 level. There  is a disc bulge eccentric to the left at L3-L4 which results in a mild  degree of canal and foraminal narrowing. The degree of canal narrowing  is mildly improved in the interval with interval resection of the  previously identified left central disc protrusion. The degree of mild  foraminal narrowing at L3-L4 is stable.     At L4-L5, there is progression of the degenerative disc change with new  areas of degenerative endplate marrow edema. Degenerative retrolisthesis  of L4 on L5 by approximately 3 mm is noted and is stable. A new  posterior annular fissure is seen at the L4-L5 level. Bulging disc  material results in a mild-to-moderate degree of bilateral foraminal  narrowing and a mild degree of canal narrowing which is more pronounced  when compared to the prior exam.     At L5-S1, a new posterior annular fissure is identified. There are  stable mild bilateral foraminal narrowing secondary to a disc osteophyte  complex.     The remaining degenerative phenomena within the lumbar spine are  "as  discussed in detail above.     This report was finalized on 7/6/2022 12:25 PM by Dr. Jose Harrell M.D.    The following portions of the patient's history were reviewed and updated as appropriate: allergies, current medications, past family history, past medical history, past social history, past surgical history, and problem list.    Review of Systems   Constitutional:  Negative for activity change, fatigue and fever.   Respiratory:  Negative for cough and chest tightness.    Cardiovascular:  Negative for chest pain.   Gastrointestinal:  Negative for abdominal pain, constipation and diarrhea.   Genitourinary:  Negative for dysuria.   Musculoskeletal:  Positive for back pain and neck pain.   Neurological:  Positive for numbness (left leg) and headaches. Negative for dizziness, weakness and light-headedness.   Psychiatric/Behavioral:  Positive for sleep disturbance (occ). Negative for agitation and suicidal ideas. The patient is not nervous/anxious.      I have reviewed and confirmed the accuracy of the ROS as documented by the MA/LPN/RN LUIS EDUARDO Leslie    Vitals:    01/24/25 0804   BP: 124/73   BP Location: Left arm   Patient Position: Sitting   Cuff Size: Large Adult   Pulse: 81   Resp: 18   Temp: 95.5 °F (35.3 °C)   TempSrc: Temporal   SpO2: 98%   Weight: 90.3 kg (199 lb)   Height: 177.8 cm (70\")   PainSc:   6     Objective   Physical Exam  Constitutional:       Appearance: Normal appearance.   HENT:      Head: Normocephalic.   Cardiovascular:      Rate and Rhythm: Normal rate and regular rhythm.   Pulmonary:      Effort: Pulmonary effort is normal.      Breath sounds: Normal breath sounds.   Musculoskeletal:      Right shoulder: Tenderness and bony tenderness present. Decreased range of motion.      Left shoulder: Tenderness, bony tenderness and crepitus present. Decreased range of motion.      Cervical back: Normal range of motion. Tenderness present. No bony tenderness. No pain with movement.      " Lumbar back: Tenderness and bony tenderness present. Decreased range of motion. Positive left straight leg raise test. Negative right straight leg raise test.      Right hip: Tenderness and bony tenderness present.   Skin:     General: Skin is warm and dry.      Capillary Refill: Capillary refill takes less than 2 seconds.   Neurological:      General: No focal deficit present.      Mental Status: He is alert and oriented to person, place, and time.   Psychiatric:         Mood and Affect: Mood normal.         Speech: Speech normal.         Behavior: Behavior normal.         Thought Content: Thought content normal.         Cognition and Memory: Cognition normal.       Assessment & Plan   Diagnoses and all orders for this visit:    1. Lumbar radiculitis (Primary)    2. Lumbar stenosis with neurogenic claudication    3. Left lumbar radiculopathy    4. Displacement of cervical intervertebral disc without myelopathy  -     HYDROcodone-acetaminophen (NORCO)  MG per tablet; Take 1 tablet every 4-6 hours as needed for pain. Max 5/day. 30 day supply. DNF 1/26/25  Dispense: 150 tablet; Refill: 0    5. DDD (degenerative disc disease), lumbar  -     HYDROcodone-acetaminophen (NORCO)  MG per tablet; Take 1 tablet every 4-6 hours as needed for pain. Max 5/day. 30 day supply. DNF 1/26/25  Dispense: 150 tablet; Refill: 0    6. Cervical radiculopathy    7. Muscle spasm    8. High risk medication use      Colby Alvarado reports a pain score of 6.  Given his pain assessment as noted, treatment options were discussed and the following options were decided upon as a follow-up plan to address the patient's pain: continuation of current treatment plan for pain, prescription for opiod analgesics, and steroid injections.    --- Left L4 & L5 TF LESI  ---  Indications for epidural injection:  Plan is to proceed with epidural at the appropriate level.  If the patient receives significant pain reduction and improvement in function  and the plan will be to repeat the epidural when the pain worsens.  If a second epidural provides at least 6 weeks of sustained improvement that includes both pain reduction and improvement in function then an epidural injection could be repeated once again at the same level.  This is a mutual decision between the clinician and the patient that includes discussions including risks and benefits in detail as well as alternative therapies.  Patient's questions were answered to their satisfaction and to their understanding.  ---   --- The urine drug screen confirmation from 11/25/24 has been reviewed and the result is appropriate based on patient history and CARI report   --- The patient signed an updated copy of the controlled substance agreement on 5/25/24  --- Continue Flexeril and Meloxicam. No refill needed at this time.   --- Patient to reach out [facility] in [place] to see if they can release CMP results from 10/18/24. He was told they were faxed following his last office visit but we have not received them.   --- Continue Hydrocodone. DNF 1/26/25. Patient appears stable with current regimen. No adverse effects. Regarding continuation of opioids, there is no evidence of aberrant behavior or any red flags.  The patient continues with appropriate response to opioid therapy. ADL's remain intact by self.   --- Follow-up 2 months for medication management and for procedure       CARI REPORT  As part of the patient's treatment plan, I am prescribing controlled substances. The patient has been made aware of appropriate use of such medications, including potential risk of somnolence, limited ability to drive and/or work safely, and the potential for dependence or overdose. It has also been made clear that these medications are for use by this patient only, without concomitant use of alcohol or other substances unless prescribed.     Patient has completed prescribing agreement detailing terms of continued  prescribing of controlled substances, including monitoring CARI reports, urine drug screening, and pill counts if necessary. The patient is aware that inappropriate use will results in cessation of prescribing such medications.    As the clinician, I personally reviewed the CARI from 1/24/25 while the patient was in the office today.    History and physical exam exhibit continued safe and appropriate use of controlled substances.    Dictated utilizing Dragon dictation.

## 2025-01-24 NOTE — H&P (VIEW-ONLY)
CHIEF COMPLAINT  Neck and back pain    Subjective   Colby Alvarado is a 59 y.o. male  who presents for follow-up.  He has a history of chronic back and neck pain. He reports that his pain has remained consistent since his last office visit.    Today pain is 5/10VAS in severity. Pain is located in his neck, lower back, and bilateral hips. His main complaint today is axial low back pain that radiates down left lateral leg terminating mid calf.  Describes this pain as an intermittent aching and burning. He notes intermittent tingling to left anterior thigh. Pain is worsened by certain movements, prolonged positions (standing), bending/twisting, and walking long distances. Pain improves with rest, reposition, and medication. Neck pain is stable at this time.      Continues with Hydrocodone 10mg 5/day, Flexeril 10mg PRN, and Meloxicam 15mg daily. Denies any side effects from the regimen, including constipation and somnolence. The regimen helps decrease pain by a moderate amount. He works as a  and this helps gets through the day. ADL's by self. Denies any bowel or bladder changes.      Followed by Dr. Estela Townsend on 2/9/24 for left shoulder pain.    He was scheduled for on 1/16/25 for a repeat lumbar RFA but had to cancel this due to the flu.      Procedures:  11/6/24 - C5-C6 SHAY - 50% ongoing relief  11/28/23 - C5-C6 SHAY - 60% relief for over 6 months   5/11/23 - Bilateral L3-L5 RFA - 50% ongoing relief  3/16/23 - Bilateral L3-L5 MBB - 80% relief x 24 hours  11/10/22 - bilateral L3-L5 MBB - 80% relief x a few hours  8/23/22 - bilateral L4 LTFESI - 90% relief of radicular symptoms - no relief of back pain  5/19/22 - SHAY - 50% ongoing relief  4/5/22 - SHAY - 80% relief x a couple of weeks     He has a history of lumbar spine surgery 15 years ago with Dr. Moon.     Back Pain  The current episode started more than 1 month ago. The problem occurs daily. The problem is unchanged. The pain is  present in the lumbar spine. The quality of the pain is described as aching and burning. The pain radiates to the left thigh and right thigh (bilateral hips, lateral legs). The pain is at a severity of 6/10. The symptoms are aggravated by lying down, standing and sitting (work, prolonged position, lifting). Associated symptoms include headaches and numbness (left leg). Pertinent negatives include no abdominal pain, chest pain, dysuria, fever or weakness. He has tried NSAIDs and analgesics for the symptoms. The treatment provided mild relief.   Neck Pain   This is a chronic problem. The current episode started more than 1 year ago. The problem occurs daily. The problem has been gradually improving. The pain is present in the left side, midline and right side. The quality of the pain is described as aching and burning. The pain is at a severity of 5/10 (worse today due to right shoulder pain). The symptoms are aggravated by position and twisting. Associated symptoms include headaches and numbness (left leg). Pertinent negatives include no chest pain, fever or weakness. He has tried NSAIDs, acetaminophen, neck support, muscle relaxants and oral narcotics for the symptoms. The treatment provided mild relief.      PEG Assessment   What number best describes your pain on average in the past week?8  What number best describes how, during the past week, pain has interfered with your enjoyment of life?6  What number best describes how, during the past week, pain has interfered with your general activity?  6    Review of Pertinent Medical Data ---  MRI OF THE LUMBAR SPINE WITH AND WITHOUT CONTRAST     CLINICAL HISTORY: Degenerative disc disease. Bilateral leg pain. History  of L3-L4 discectomy.     TECHNIQUE: MRI of the lumbar spine was obtained with sagittal pre and  postgadolinium T1, proton-density, and T2-weighted images. Additionally,  there are axial pre and postgadolinium T1 and T2-weighted images through  the lumbar  spine.     COMPARISON: Comparison is made to previous outside MRI of the lumbar  spine from [facility] and [facility] dated 01/09/2013.     FINDINGS:     The conus medullaris terminates at the T12-L1 level and has normal  signal intensity. The visualized distal thoracic spinal cord is  unremarkable.     At T12-L1, there is no significant canal or foraminal stenosis.     At L1-L2, there are degenerative disc changes with adjacent degenerative  endplate marrow edema. Bulging disc material at L1-L2 results in a mild  degree of canal and foraminal narrowing. All of these changes are new  when compared to the prior exam.     At L2-L3, there is a disc bulge which mildly narrows the neural foramina  bilaterally. No significant canal stenosis is identified. The mild  foraminal narrowing is new when compared to the prior exam. A right  posterior lateral annular fissure is identified and new since the prior  study.     At the L3-L4 level, the patient has undergone an interval left  laminectomy and partial facetectomy procedure. There is a disc bulge  with a right posterior lateral annular fissure that is eccentric to the  left and results in a mild degree of canal narrowing, particularly along  the left side of the spinal canal. On the prior study, there was a left  central disc protrusion which is no longer evident. Bulging disc  material also results in mild foraminal narrowing. Although the left  central disc protrusion is no longer evident, the degree of canal  narrowing is mildly improved with resection of the left central disc  protrusion. The degree of foraminal narrowing is stable.     At L4-L5, there are degenerative disc changes with adjacent degenerative  endplate marrow edema. There is degenerative retrolisthesis of L4 on L5  by approximately 3 mm. There is a new posterior annular fissure at  L4-L5. Bulging disc material at L4-L5 results in a mild-to-moderate  degree of bilateral foraminal narrowing. Mild canal  narrowing and left  lateral recess narrowing is appreciated secondary to a disc bulge. When  compared to the prior study, the degenerative disc changes and  degenerative endplate marrow edema is new. The mild degree of canal  narrowing and mild-to-moderate degree of foraminal narrowing is slightly  more pronounced when compared to the prior study.     At L5-S1, there is a disc osteophyte complex which mildly narrows the  neural foramina and these findings are unchanged. A posterior annular  fissure is noted and is new since the prior study.     IMPRESSION:  When compared to the prior exam dated 01/09/2013, at L1-L2, there are  new degenerative disc changes with new adjacent degenerative endplate  marrow edema. Mild canal and foraminal narrowing at the L1-L2 level is  also new.     In the interval since the prior study, the patient has undergone a left  laminectomy and partial facetectomy procedure at the L3-L4 level. There  is a disc bulge eccentric to the left at L3-L4 which results in a mild  degree of canal and foraminal narrowing. The degree of canal narrowing  is mildly improved in the interval with interval resection of the  previously identified left central disc protrusion. The degree of mild  foraminal narrowing at L3-L4 is stable.     At L4-L5, there is progression of the degenerative disc change with new  areas of degenerative endplate marrow edema. Degenerative retrolisthesis  of L4 on L5 by approximately 3 mm is noted and is stable. A new  posterior annular fissure is seen at the L4-L5 level. Bulging disc  material results in a mild-to-moderate degree of bilateral foraminal  narrowing and a mild degree of canal narrowing which is more pronounced  when compared to the prior exam.     At L5-S1, a new posterior annular fissure is identified. There are  stable mild bilateral foraminal narrowing secondary to a disc osteophyte  complex.     The remaining degenerative phenomena within the lumbar spine are  "as  discussed in detail above.     This report was finalized on 7/6/2022 12:25 PM by Dr. Jose Harrell M.D.    The following portions of the patient's history were reviewed and updated as appropriate: allergies, current medications, past family history, past medical history, past social history, past surgical history, and problem list.    Review of Systems   Constitutional:  Negative for activity change, fatigue and fever.   Respiratory:  Negative for cough and chest tightness.    Cardiovascular:  Negative for chest pain.   Gastrointestinal:  Negative for abdominal pain, constipation and diarrhea.   Genitourinary:  Negative for dysuria.   Musculoskeletal:  Positive for back pain and neck pain.   Neurological:  Positive for numbness (left leg) and headaches. Negative for dizziness, weakness and light-headedness.   Psychiatric/Behavioral:  Positive for sleep disturbance (occ). Negative for agitation and suicidal ideas. The patient is not nervous/anxious.      I have reviewed and confirmed the accuracy of the ROS as documented by the MA/LPN/RN LUIS EDUARDO Leslie    Vitals:    01/24/25 0804   BP: 124/73   BP Location: Left arm   Patient Position: Sitting   Cuff Size: Large Adult   Pulse: 81   Resp: 18   Temp: 95.5 °F (35.3 °C)   TempSrc: Temporal   SpO2: 98%   Weight: 90.3 kg (199 lb)   Height: 177.8 cm (70\")   PainSc:   6     Objective   Physical Exam  Constitutional:       Appearance: Normal appearance.   HENT:      Head: Normocephalic.   Cardiovascular:      Rate and Rhythm: Normal rate and regular rhythm.   Pulmonary:      Effort: Pulmonary effort is normal.      Breath sounds: Normal breath sounds.   Musculoskeletal:      Right shoulder: Tenderness and bony tenderness present. Decreased range of motion.      Left shoulder: Tenderness, bony tenderness and crepitus present. Decreased range of motion.      Cervical back: Normal range of motion. Tenderness present. No bony tenderness. No pain with movement.      " Lumbar back: Tenderness and bony tenderness present. Decreased range of motion. Positive left straight leg raise test. Negative right straight leg raise test.      Right hip: Tenderness and bony tenderness present.   Skin:     General: Skin is warm and dry.      Capillary Refill: Capillary refill takes less than 2 seconds.   Neurological:      General: No focal deficit present.      Mental Status: He is alert and oriented to person, place, and time.   Psychiatric:         Mood and Affect: Mood normal.         Speech: Speech normal.         Behavior: Behavior normal.         Thought Content: Thought content normal.         Cognition and Memory: Cognition normal.       Assessment & Plan   Diagnoses and all orders for this visit:    1. Lumbar radiculitis (Primary)    2. Lumbar stenosis with neurogenic claudication    3. Left lumbar radiculopathy    4. Displacement of cervical intervertebral disc without myelopathy  -     HYDROcodone-acetaminophen (NORCO)  MG per tablet; Take 1 tablet every 4-6 hours as needed for pain. Max 5/day. 30 day supply. DNF 1/26/25  Dispense: 150 tablet; Refill: 0    5. DDD (degenerative disc disease), lumbar  -     HYDROcodone-acetaminophen (NORCO)  MG per tablet; Take 1 tablet every 4-6 hours as needed for pain. Max 5/day. 30 day supply. DNF 1/26/25  Dispense: 150 tablet; Refill: 0    6. Cervical radiculopathy    7. Muscle spasm    8. High risk medication use      Colby Alvarado reports a pain score of 6.  Given his pain assessment as noted, treatment options were discussed and the following options were decided upon as a follow-up plan to address the patient's pain: continuation of current treatment plan for pain, prescription for opiod analgesics, and steroid injections.    --- Left L4 & L5 TF LESI  ---  Indications for epidural injection:  Plan is to proceed with epidural at the appropriate level.  If the patient receives significant pain reduction and improvement in function  and the plan will be to repeat the epidural when the pain worsens.  If a second epidural provides at least 6 weeks of sustained improvement that includes both pain reduction and improvement in function then an epidural injection could be repeated once again at the same level.  This is a mutual decision between the clinician and the patient that includes discussions including risks and benefits in detail as well as alternative therapies.  Patient's questions were answered to their satisfaction and to their understanding.  ---   --- The urine drug screen confirmation from 11/25/24 has been reviewed and the result is appropriate based on patient history and CARI report   --- The patient signed an updated copy of the controlled substance agreement on 5/25/24  --- Continue Flexeril and Meloxicam. No refill needed at this time.   --- Patient to reach out [facility] in [place] to see if they can release CMP results from 10/18/24. He was told they were faxed following his last office visit but we have not received them.   --- Continue Hydrocodone. DNF 1/26/25. Patient appears stable with current regimen. No adverse effects. Regarding continuation of opioids, there is no evidence of aberrant behavior or any red flags.  The patient continues with appropriate response to opioid therapy. ADL's remain intact by self.   --- Follow-up 2 months for medication management and for procedure       CARI REPORT  As part of the patient's treatment plan, I am prescribing controlled substances. The patient has been made aware of appropriate use of such medications, including potential risk of somnolence, limited ability to drive and/or work safely, and the potential for dependence or overdose. It has also been made clear that these medications are for use by this patient only, without concomitant use of alcohol or other substances unless prescribed.     Patient has completed prescribing agreement detailing terms of continued  prescribing of controlled substances, including monitoring CARI reports, urine drug screening, and pill counts if necessary. The patient is aware that inappropriate use will results in cessation of prescribing such medications.    As the clinician, I personally reviewed the CARI from 1/24/25 while the patient was in the office today.    History and physical exam exhibit continued safe and appropriate use of controlled substances.    Dictated utilizing Dragon dictation.

## 2025-02-08 DIAGNOSIS — M51.369 DDD (DEGENERATIVE DISC DISEASE), LUMBAR: ICD-10-CM

## 2025-02-08 DIAGNOSIS — G89.29 CHRONIC LEFT SHOULDER PAIN: ICD-10-CM

## 2025-02-08 DIAGNOSIS — M25.512 CHRONIC LEFT SHOULDER PAIN: ICD-10-CM

## 2025-02-10 RX ORDER — MELOXICAM 15 MG/1
15 TABLET ORAL DAILY
Qty: 30 TABLET | Refills: 0 | OUTPATIENT
Start: 2025-02-10

## 2025-02-10 NOTE — TELEPHONE ENCOUNTER
Can you please ask if he was able to obtain the CMP that I ordered a few months ago? I have not seen those results yet and need it review them before refilling his meloxicam.

## 2025-02-14 ENCOUNTER — TELEPHONE (OUTPATIENT)
Dept: PAIN MEDICINE | Facility: CLINIC | Age: 60
End: 2025-02-14

## 2025-02-14 NOTE — TELEPHONE ENCOUNTER
"Provider: ADILSON    Caller: Colby Alvarado \"NONA\"    Relationship to Patient: Self    Pharmacy:     Phone Number: [phone]    Reason for Call: PT CALLED TO FIND OUT IF HIS TIME HAS CHANGED FOR HIS PROCEDURE ON 2/21/25   "
Please see
No

## 2025-02-17 NOTE — PROGRESS NOTES
Date of Office Visit: 2025  Encounter Provider: Sherron Cedillo MD  Place of Service: [facility] CARDIOLOGY  Patient Name: Colby Alvarado  :1965    Chief complaint  Atrial fibrillation, hypertension.    History of Present Illness  Is a 59-year-old gentleman with hyperlipidemia, hypertension, obstructive sleep apnea (on CPAP) and proximal atrial fibrillation.  In  he underwent pulmonary vein ablation and Eliquis was discontinued 3 months later.  An echocardiogram showed normal systolic function with mild left ventricular pretrip he with abnormal strain.  On 2019 hydrochlorothiazide was added for hypertension and he was subsequently found to have sleep apnea.  Mild aortic valve calcification without regurgitation stenosis is trivial mitral regurgitation normal right-sided pressures.  An exercise perfusion study was negative for ischemia.  Holter showed sinus rhythm with rare PACs and no other significant arrhythmia.  At echocardiogram on 2021 showed an ejection fraction 56% normal diastolic function. When last seen in clinic on 2024 doing relatively well.    Since last visit patient has had palpitations about 5-6 times in the past 3 months.  Overall feels it is better.  Has had no shortness of breath edema dizziness.  He has had chest discomfort twice a month that occurs at rest and with walking.  He is troubled by back pain had L3 L5 ablation in 2025.  He is to receive an epidural on .  He is using CPAP consistently.    Past Medical History:   Diagnosis Date    Anxiety     Arthritis of back     Atrial fibrillation     Cervical disc disorder 2019    Chronic pain     Chronic pain disorder     Cluster headache 1992    Depression     Extremity pain ?    H/O cluster headache     Hearing loss     Hyperlipidemia     Hypertension     Insomnia     Joint pain     Low back pain     Lumbar stenosis with neurogenic claudication 2025     Lumbosacral disc disease ?    Neck pain 2019    CAL on CPAP 11/23/2020    Home sleep study.  KALEIGH 6.4 events per hour.  Low oxygen saturation 84%    Periarthritis of shoulder     Seasonal allergies      Past Surgical History:   Procedure Laterality Date    BACK SURGERY      CARDIAC ABLATION  07/31/2019    CERVICAL EPIDURAL Bilateral 04/05/2022    Procedure: CERVICAL EPIDURAL;  Surgeon: Cruz Aguayo MD;  [facility];  Service: Pain Management;  Laterality: Bilateral;    CERVICAL EPIDURAL N/A 05/19/2022    Procedure: CERVICAL EPIDURAL;  Surgeon: Cruz Aguayo MD;  [facility];  Service: Pain Management;  Laterality: N/A;    CERVICAL EPIDURAL N/A 11/28/2023    Procedure: CERVICAL EPIDURAL STEROID INJECTION CPT: 78973;  Surgeon: Cruz Aguayo MD;  [facility];  Service: Pain Management;  Laterality: N/A;    EPIDURAL Bilateral 08/23/2022    Procedure: L4 TRANSFORAMINAL LUMBAR EPIDURAL STEROID INJECTION;  Surgeon: Cruz Aguayo MD;  [facility];  Service: Pain Management;  Laterality: Bilateral;    EPIDURAL Left 2/21/2025    Procedure: LEFT L4 & L5 TRANSFORAMINAL LUMBAR EPIDURAL STEROID INJECTION CPT: 01669, 80206;  Surgeon: Cruz Aguayo MD;  [facility];  Service: Pain Management;  Laterality: Left;    EYE MUSCLE SURGERY Left     HAND SURGERY Right     open fracture plate    HARDWARE REMOVAL Right     rt hand  plate    HERNIA REPAIR      MEDIAL BRANCH BLOCK Bilateral 11/10/2022    Procedure: MEDIAL BRANCH BLOCK LUMBAR bilateral L3-L5;  Surgeon: Cruz Aguayo MD;  [facility];  Service: Pain Management;  Laterality: Bilateral;    MEDIAL BRANCH BLOCK Bilateral 03/16/2023    Procedure: BILATEAL L3-L5 LUMBAR MEDIAL BRANCH BLOCK;  Surgeon: Cruz Aguayo MD;  [facility];  Service: Pain Management;  Laterality: Bilateral;    NASAL FRACTURE SURGERY      RADIOFREQUENCY ABLATION Bilateral 05/11/2023     Procedure: BILATERAL L3-L5 RADIOFREQUENCY ABLATION LUMBAR CPT: 18704, 04526;  Surgeon: Cruz Aguayo MD;  Location: [facility] OR;  Service: Pain Management;  Laterality: Bilateral;    SHOULDER ARTHROSCOPY W/ ROTATOR CUFF REPAIR Right 03/13/2019    Procedure: SHOULDER ARTHROSCOPY, DEBRIDEIMENT OF LABRUM AND SUBSCAP, DECEOMPRESSION, DISTAL CLAVICLE EXCISION,  WITH MINI OPEN ROTATOR CUFF REPAIR;  Surgeon: Estela Townsend MD;  [place] [facility] [place] [facility] [place]  Service: [place]opedics    SHOULDER SURGERY      SPINE SURGERY      VASECTOMY       Outpatient Medications Prior to Visit   Medication Sig Dispense Refill    atorvastatin (LIPITOR) 80 MG tablet Take 1 tablet by mouth Every Night.      cetirizine (zyrTEC) 10 MG tablet Take 1 tablet by mouth Daily.      cyclobenzaprine (FLEXERIL) 10 MG tablet TAKE 1 TABLET BY MOUTH TWICE DAILY AS NEEDED FOR MUSCLE SPASMS 60 tablet 2    Galcanezumab-gnlm (Emgality, 300 MG Dose,) 100 MG/ML solution prefilled syringe As Needed.      meloxicam (MOBIC) 15 MG tablet TAKE 1 TABLET BY MOUTH DAILY 30 tablet 0    SUMAtriptan Succinate (IMITREX) 6 MG/0.5ML injection sumatriptan 6 mg/0.5 mL subcutaneous pen injector      zolpidem (AMBIEN) 10 MG tablet Take 1 tablet by mouth At Night As Needed for Sleep.      HYDROcodone-acetaminophen (NORCO)  MG per tablet Take 1 tablet every 4-6 hours as needed for pain. Max 5/day. 30 day supply. DNF 1/26/25 150 tablet 0    lisinopril (PRINIVIL,ZESTRIL) 10 MG tablet Take 1 tablet by mouth 2 (two) times a day. 60 tablet 6    metoprolol succinate XL (TOPROL-XL) 100 MG 24 hr tablet TAKE 1 TABLET BY MOUTH EVERY DAY. CAN TAKE AN EXTRA 1/2 TABLET DAILY AS NEEDED 135 tablet 2    topiramate (TOPAMAX) 50 MG tablet Take 2 tablets by mouth Every Night. (Patient not taking: Reported on 2/18/2025)       No facility-administered medications prior to visit.       Allergies as of 02/18/2025    (No Known Allergies)     Social History     Socioeconomic History    Marital  status:    Tobacco Use    Smoking status: Some Days     Current packs/day: 1.00     Average packs/day: 1 pack/day for 42.2 years (42.2 ttl pk-yrs)     Types: Cigarettes     Start date: 1983     Passive exposure: Current    Smokeless tobacco: Former     Types: Snuff   Vaping Use    Vaping status: Some Days    Substances: Nicotine    Devices: Pre-filled or refillable cartridge   Substance and Sexual Activity    Alcohol use: Not Currently     Alcohol/week: 16.0 standard drinks of alcohol     Types: 16 Cans of beer per week     Comment: caffiene    Drug use: No    Sexual activity: Yes     Partners: Female     Birth control/protection: Vasectomy     Family History   Problem Relation Age of Onset    Emphysema Mother     Hypertension Mother     Heart disease Mother     Cancer Mother         Oral    COPD Mother     Migraines Mother     Hypertension Sister     No Known Problems Brother     Heart attack Maternal Grandmother     Emphysema Maternal Grandmother     Heart disease Maternal Grandmother     Stroke Maternal Grandmother     Heart attack Maternal Grandfather     Emphysema Maternal Grandfather     Sleep apnea Other     Hypertension Other     Heart disease Other     Lung disease Other     Malig Hyperthermia Neg Hx      Review of Systems   Constitutional: Negative for chills, fever, weight gain and weight loss.   Cardiovascular:  Negative for leg swelling.   Respiratory:  Positive for snoring. Negative for cough and wheezing.    Hematologic/Lymphatic: Negative for bleeding problem. Does not bruise/bleed easily.   Skin:  Negative for color change.   Musculoskeletal:  Positive for joint pain. Negative for falls and myalgias.   Gastrointestinal:  Negative for melena.   Genitourinary:  Negative for hematuria.   Neurological:  Positive for excessive daytime sleepiness.   Psychiatric/Behavioral:  Negative for depression. The patient is not nervous/anxious.         Objective:     Vitals:    02/18/25 0659   BP: 128/74   BP  "Location: Left arm   Patient Position: Sitting   Cuff Size: Large Adult   Pulse: 90   Weight: 89.4 kg (197 lb)   Height: 177.8 cm (70\")     Body mass index is 28.27 kg/m².    Vitals reviewed.   Constitutional:       Appearance: Well-developed.   Eyes:      General: No scleral icterus.        Right eye: No discharge.      Conjunctiva/sclera: Conjunctivae normal.      Pupils: Pupils are equal, round, and reactive to light.   HENT:      Head: Normocephalic.      Nose: Nose normal.   Neck:      Thyroid: No thyromegaly.      Vascular: No JVD.   Pulmonary:      Effort: Pulmonary effort is normal. No respiratory distress.      Breath sounds: Normal breath sounds. No wheezing. No rales.   Cardiovascular:      Normal rate. Regular rhythm. Normal S1. Normal S2.       Murmurs: There is no murmur.      No gallop.    Pulses:     Intact distal pulses.      Carotid: 2+ bilaterally.     Radial: 2+ bilaterally.     Femoral: 2+ bilaterally.     Popliteal: 2+ bilaterally.     Dorsalis pedis: 2+ bilaterally.     Posterior tibial: 2+ bilaterally.  Edema:     Peripheral edema absent.   Abdominal:      General: Bowel sounds are normal. There is no distension.      Palpations: Abdomen is soft.      Tenderness: There is no abdominal tenderness. There is no rebound.   Musculoskeletal: Normal range of motion.         General: No tenderness.      Cervical back: Normal range of motion and neck supple. Skin:     General: Skin is warm and dry.      Findings: No erythema or rash.   Neurological:      Mental Status: Alert and oriented to person, place, and time.   Psychiatric:         Behavior: Behavior normal.         Thought Content: Thought content normal.         Judgment: Judgment normal.       Lab Review:       Lab Results - Last 18 Months   Lab Units 02/01/24  0950   GLUCOSE mg/dL 112*   BUN mg/dL 16   CREATININE mg/dL 0.93   SODIUM mmol/L 143   POTASSIUM mmol/L 4.6   CHLORIDE mmol/L 109*   CO2 mmol/L 21.3*   CALCIUM mg/dL 9.4   TOTAL " PROTEIN g/dL 6.7   ALBUMIN g/dL 4.4   ALT (SGPT) U/L 52*   AST (SGOT) U/L 26   ALK PHOS U/L 110   BILIRUBIN mg/dL 0.5   GLOBULIN gm/dL 2.3   A/G RATIO g/dL 1.9   BUN / CREAT RATIO  17.2   ANION GAP mmol/L 12.7   EGFR mL/min/1.73 96.4         ECG 12 Lead    Date/Time: 2/18/2025 8:02 PM  Performed by: Sherron Cedillo MD    Authorized by: Sherron Cedillo MD  Comparison: compared with previous ECG   Similar to previous ECG  Rhythm: sinus rhythm  Conduction: non-specific intraventricular conduction delay  Other findings: left ventricular hypertrophy    Clinical impression: abnormal EKG        Assessment:       Diagnosis Plan   1. Essential hypertension  Adult Transthoracic Echo Complete W/ Cont if Necessary Per Protocol    Stress Test With Myocardial Perfusion One Day    ECG 12 Lead      2. Precordial pain  Adult Transthoracic Echo Complete W/ Cont if Necessary Per Protocol    Stress Test With Myocardial Perfusion One Day    ECG 12 Lead      3. PAF (paroxysmal atrial fibrillation)  Adult Transthoracic Echo Complete W/ Cont if Necessary Per Protocol    ECG 12 Lead        Plan:       1. Chest pain, Atypical and anginal symtoms.  Remains sporadic occurring once or twice a month.  Does not sound anginal.  Occasional last 5 minutes radiates to left arm.  Does mention antihypertensives as below and observe symptoms further.  Will decrease lisinopril 10 mg in a.m. and increase metoprolol to 100 mg in a.m. and 50 mg in p.m.  2.  Hypertension with hypertensive heart disease and normal: She will strain by echo in 2021.  3.  Hyperlipidemia  4.  Paroxysmal atrial fibrillation, s/p pulmonary vein isolation 7/2019.  Currently off anticoagulation.  If palpitations persist despite changes in medications will place a Zio patch and if this shows recurrent atrial fibrillation will resume Eliquis and consider further EP evaluation.  5.  Nicotine use  5.  History of migraine headaches  6.  Obstructive sleep apnea, on CPAP  1.  Right upper quadrant  pain.  On exam he is mildly tender.  It is very minimal.  He will discuss this further with Dr. Simons when he plans to see in the near future.  Time Spent: I spent 35 minutes caring for Colby on this date of service. This time includes time spent by me in the following activities: preparing for the visit, reviewing tests, obtaining and/or reviewing a separately obtained history, performing a medically appropriate examination and/or evaluation, counseling and educating the patient/family/caregiver, ordering medications, tests, or procedures, documenting information in the medical record, and independently interpreting results and communicating that information with the patient/family/caregiver.   I spent 1 minutes on the separately reported service of ECG. This time is not included in the time used to support the E/M service also reported today.        Your medication list            Accurate as of February 18, 2025 11:59 PM. If you have any questions, ask your nurse or doctor.                CHANGE how you take these medications        Instructions Last Dose Given Next Dose Due   lisinopril 10 MG tablet  Commonly known as: PRINIVIL,ZESTRIL  What changed: when to take this  Changed by: Mini Cedillo      Take 1 tablet by mouth Daily.       metoprolol succinate  MG 24 hr tablet  Commonly known as: TOPROL-XL  What changed: See the new instructions.  Changed by: Mini Cedillo      Take one tablet po q am and half tablet po q pm              CONTINUE taking these medications        Instructions Last Dose Given Next Dose Due   atorvastatin 80 MG tablet  Commonly known as: LIPITOR      Take 1 tablet by mouth Every Night.       cetirizine 10 MG tablet  Commonly known as: zyrTEC      Take 1 tablet by mouth Daily.       cyclobenzaprine 10 MG tablet  Commonly known as: FLEXERIL      TAKE 1 TABLET BY MOUTH TWICE DAILY AS NEEDED FOR MUSCLE SPASMS       Emgality (300 MG Dose) 100 MG/ML solution prefilled syringe  Generic drug:  Galcanezumab-[facility]      As Needed.       HYDROcodone-acetaminophen  MG per tablet  Commonly known as: NORCO      Take 1 tablet every 4-6 hours as needed for pain. Max 5/day. 30 day supply. DNF 1/26/25       meloxicam 15 MG tablet  Commonly known as: MOBIC      TAKE 1 TABLET BY MOUTH DAILY       SUMAtriptan Succinate 6 MG/0.5ML injection  Commonly known as: IMITREX      sumatriptan 6 mg/0.5 mL subcutaneous pen injector       zolpidem 10 MG tablet  Commonly known as: AMBIEN      Take 1 tablet by mouth At Night As Needed for Sleep.                 Where to Get Your Medications        These medications were sent to [id number] - [place], [place] - [address] AT [address]  [address] - [phone]  - [phone] FX  [address] [address]      Phone: [phone]   lisinopril 10 MG tablet  metoprolol succinate  MG 24 hr tablet         Patient is no longer taking -.  I corrected the med list to reflect this.  I did not stop these medications.      Dictated utilizing Dragon dictation

## 2025-02-18 ENCOUNTER — OFFICE VISIT (OUTPATIENT)
Dept: CARDIOLOGY | Facility: CLINIC | Age: 60
End: 2025-02-18
Payer: COMMERCIAL

## 2025-02-18 VITALS
BODY MASS INDEX: 28.2 KG/M2 | WEIGHT: 197 LBS | SYSTOLIC BLOOD PRESSURE: 128 MMHG | DIASTOLIC BLOOD PRESSURE: 74 MMHG | HEART RATE: 90 BPM | HEIGHT: 70 IN

## 2025-02-18 DIAGNOSIS — R07.2 PRECORDIAL PAIN: ICD-10-CM

## 2025-02-18 DIAGNOSIS — I48.0 PAF (PAROXYSMAL ATRIAL FIBRILLATION): ICD-10-CM

## 2025-02-18 DIAGNOSIS — I10 ESSENTIAL HYPERTENSION: Primary | ICD-10-CM

## 2025-02-18 PROCEDURE — 93000 ELECTROCARDIOGRAM COMPLETE: CPT | Performed by: INTERNAL MEDICINE

## 2025-02-18 PROCEDURE — 99214 OFFICE O/P EST MOD 30 MIN: CPT | Performed by: INTERNAL MEDICINE

## 2025-02-18 RX ORDER — METOPROLOL SUCCINATE 100 MG/1
TABLET, EXTENDED RELEASE ORAL
Qty: 150 TABLET | Refills: 3 | Status: SHIPPED | OUTPATIENT
Start: 2025-02-18

## 2025-02-18 RX ORDER — LISINOPRIL 10 MG/1
10 TABLET ORAL DAILY
Qty: 90 TABLET | Refills: 3 | Status: SHIPPED | OUTPATIENT
Start: 2025-02-18

## 2025-02-21 ENCOUNTER — HOSPITAL ENCOUNTER (OUTPATIENT)
Dept: GENERAL RADIOLOGY | Facility: SURGERY CENTER | Age: 60
Setting detail: HOSPITAL OUTPATIENT SURGERY
End: 2025-02-21
Payer: COMMERCIAL

## 2025-02-21 ENCOUNTER — TELEPHONE (OUTPATIENT)
Dept: PAIN MEDICINE | Facility: CLINIC | Age: 60
End: 2025-02-21

## 2025-02-21 ENCOUNTER — HOSPITAL ENCOUNTER (OUTPATIENT)
Facility: SURGERY CENTER | Age: 60
Setting detail: HOSPITAL OUTPATIENT SURGERY
Discharge: HOME | End: 2025-02-21
Attending: ANESTHESIOLOGY | Admitting: ANESTHESIOLOGY
Payer: COMMERCIAL

## 2025-02-21 VITALS
TEMPERATURE: 98.4 F | SYSTOLIC BLOOD PRESSURE: 149 MMHG | RESPIRATION RATE: 16 BRPM | BODY MASS INDEX: 28.06 KG/M2 | OXYGEN SATURATION: 96 % | DIASTOLIC BLOOD PRESSURE: 83 MMHG | HEIGHT: 70 IN | HEART RATE: 78 BPM | WEIGHT: 196 LBS

## 2025-02-21 DIAGNOSIS — M51.369 DDD (DEGENERATIVE DISC DISEASE), LUMBAR: ICD-10-CM

## 2025-02-21 DIAGNOSIS — M54.16 LEFT LUMBAR RADICULOPATHY: ICD-10-CM

## 2025-02-21 DIAGNOSIS — M54.16 LUMBAR RADICULITIS: ICD-10-CM

## 2025-02-21 DIAGNOSIS — M50.20 DISPLACEMENT OF CERVICAL INTERVERTEBRAL DISC WITHOUT MYELOPATHY: ICD-10-CM

## 2025-02-21 DIAGNOSIS — Z41.9 SURGERY, ELECTIVE: ICD-10-CM

## 2025-02-21 DIAGNOSIS — M48.062 LUMBAR STENOSIS WITH NEUROGENIC CLAUDICATION: ICD-10-CM

## 2025-02-21 PROCEDURE — 64484 NJX AA&/STRD TFRM EPI L/S EA: CPT | Performed by: ANESTHESIOLOGY

## 2025-02-21 PROCEDURE — 64483 NJX AA&/STRD TFRM EPI L/S 1: CPT | Performed by: ANESTHESIOLOGY

## 2025-02-21 PROCEDURE — 25510000001 IOPAMIDOL 61 % SOLUTION 30 ML VIAL: Performed by: ANESTHESIOLOGY

## 2025-02-21 PROCEDURE — 76000 FLUOROSCOPY <1 HR PHYS/QHP: CPT

## 2025-02-21 PROCEDURE — 77002 NEEDLE LOCALIZATION BY XRAY: CPT

## 2025-02-21 PROCEDURE — 25010000002 BUPIVACAINE (PF) 0.5 % SOLUTION 10 ML VIAL: Performed by: ANESTHESIOLOGY

## 2025-02-21 PROCEDURE — 25010000002 LIDOCAINE PF 1% 1 % SOLUTION 5 ML VIAL: Performed by: ANESTHESIOLOGY

## 2025-02-21 PROCEDURE — 25010000002 METHYLPREDNISOLONE PER 80 MG: Performed by: ANESTHESIOLOGY

## 2025-02-21 RX ORDER — HYDROCODONE BITARTRATE AND ACETAMINOPHEN 10; 325 MG/1; MG/1
TABLET ORAL
Qty: 150 TABLET | Refills: 0 | Status: SHIPPED | OUTPATIENT
Start: 2025-02-21

## 2025-02-21 NOTE — TELEPHONE ENCOUNTER
Refill for YEISON, patient will be out of medication on Sunday 2/23/25  Last OV: 1/24/25  Next OV: 3/21/25

## 2025-02-21 NOTE — TELEPHONE ENCOUNTER
"  Caller: Colby Alvarado \"NONA\"    Relationship: Self    Best call back number: [phone]    RX HYDROcodone-acetaminophen (NORCO)  MG per tablet     PATIENT WILL BE OUT ON SUNDAY 02/23/2025      [id number] - [place], [place] - [address] - [phone]  - [phone]   [address] [address]  Phone: [phone]  Fax: [phone]     "

## 2025-02-21 NOTE — TELEPHONE ENCOUNTER
Reviewed last office visit on 1/24/2025. UDS and CARI reviewed and are appropriate. Due to provider being out of office, will refill appropriately.    Covering for LUIS EDUARDO Barraza

## 2025-02-21 NOTE — DISCHARGE INSTRUCTIONS
[facility] - Post-procedure Instructions          --  While there are no absolute restrictions, it is recommended that you do not perform strenuous activity today. In the morning, you may resume your level of activity as before your block.    --  If you have a band-aid at your injection site, please remove it later today. Observe the area for any redness, swelling, pus-like drainage, or a temperature over 101°. If any of these symptoms occur, please call your doctor at [phone]. If after office hours, leave a message and the on-call provider will return your call.    --  Ice may be applied to your injection site. It is recommended you avoid direct heat (heating pad; hot tub) for 1-2 days.    --  Call [facility] at [phone] if you experience persistent headache, persistent bleeding from the injection site, or severe pain not relieved by heat or oral medication.    --  Do not make important decisions today.    --  Due to the effects of the block and/or the I.V. Sedation, DO NOT drive or operate hazardous machinery for 12 hours.  Local anesthetics may cause numbness after procedure and precautions must be taken with regards to operating equipment as well as with walking, even if ambulating with assistance of another person or with an assistive device.    --  Do not drink alcohol for 12 hours.    -- You may return to work tomorrow, or as directed by your referring doctor.    --  Occasionally you may notice a slight increase in your pain after the procedure. This should start to improve within the next 24-48 hours. Radiofrequency ablation procedure pain may last 3-4 weeks.    --  It may take as long as 3-4 days before you notice a gradual improvement in your pain and/or other symptoms.    -- You may continue to take your prescribed pain medication as needed.    --  Some normal possible side effects of steroid use could include fluid retention, increased blood sugar, dull headache,  increased sweating, increased appetite, mood swings and flushing.    --  Diabetics are recommended to watch their blood glucose level closely for 24-48 hours after the injection.    --  Must stay in PACU for 20 min upon arrival and prove no leg weakness before being discharged.    --  IN THE EVENT OF A LIFE THREATENING EMERGENCY, (CHEST PAIN, BREATHING DIFFICULTIES, PARALYSIS…) YOU SHOULD GO TO YOUR NEAREST EMERGENCY ROOM.    --  You should be contacted by our office within 2-3 days to schedule follow up or next appointment date.  If not contacted within 7 days, please call the office at [phone]

## 2025-02-21 NOTE — TELEPHONE ENCOUNTER
Medication Refill Request    Date of phone call: 2-21-25    Medication being requested: NORCO  MG sig: Take 1 tablet every 4-6 hours as needed for pain.   Qty: 150    Date of last visit: 1-24-25    Date of last refill:    CARI up to date?:     Next Follow up?: 3-21-25    Any new pertinent information? (i.e, new medication allergies, new use of medications, change in patient's health or condition, non-compliance or inconsistency with prescribing agreement?):

## 2025-02-21 NOTE — OP NOTE
Lumbar Transforaminal Epidural Steroid Injection (two levels)  [facility]      PREOPERATIVE DIAGNOSIS:  Lumbar Spinal Stenosis WITH Neurogenic Claudication and left Lumbar Radiculopathy    POSTOPERATIVE DIAGNOSIS:  Same as preop diagnosis    PROCEDURE:    1. CPT 35733 --  Diagnostic Transforaminal Epidural Steroid Injection at the L4 level, on the left   2. CPT 90181 --  Diagnostic Transforaminal Epidural Steroid Injection at the L5 level, on the left     PRE-PROCEDURE DISCUSSION WITH PATIENT:    Risks and complications were discussed with the patient prior to starting the procedure and informed consent was obtained.  We discussed various topics including but not limited to bleeding, infection, injury, nerve injury, paralysis, coma, death, postprocedural painful flare-up, postprocedural site soreness, and a lack of pain relief.  We discussed the diagnostic aspect of transforaminal epidural / selective nerve root blockade.    SURGEON:  Cruz Aguayo MD    REASON FOR PROCEDURE:    Degenerative changes are noted in the area., Radiating pattern of pain is likely consistent with degenerative changes in the area., and Radicular pain pattern seems consistent with this dermatome.    SEDATION:  Patient declined administration of moderate sedation    ANESTHETIC:  Marcaine 0.25%  STEROID:  Methylprednisolone (DEPO MEDROL) 80mg/ml    DESCRIPTON OF PROCEDURE:  After obtaining informed consent, an I.V. was not started in the preoperative area. The patient taken to the operating room and was placed in the prone position with a pillow under the abdomen.  All pressure points were well padded.  EKG, blood pressure, and pulse oximeter were monitored.  The lumbar area was prepped with Chloraprep and draped in a sterile fashion. Under fluoroscopic guidance in an oblique dimension on the above mentioned side, the transverse process of the first aforementioned vertebra at the junction of the body at 6 o'clock  position was identified. Skin and subcutaneous tissue was anesthetized with 1% lidocaine. A 22-gauge spinal needle was introduced under fluoroscopic guidance at the above junction into the foramen without parasthesias and into the epidural space. After confirming the position of the needle with PA, lateral, and oblique fluoroscopic views, aspiration was checked and was clear of blood or CSF.  Next, 1 mL of Omnipaque was injected. After seeing adequate spread on the corresponding nerve root, a total volume 2mL of injectate containing local anesthetic as above and half of the above mentioned corticosteroid was injected into the epidural space.  The needle was removed intact.      Next, in similar fashion, the second level mentioned above was addressed and a similar amount of injectate was delivered after similar imaging was achieved.      Vital signs were stable throughout.          ESTIMATED BLOOD LOSS:  <5 mL  SPECIMENS:  none    COMPLICATIONS:   No complications were noted., There was no indication of vascular uptake on live injection of contrast dye., and There was no indication of intrathecal uptake on live injection of contrast dye.    TOLERANCE & DISCHARGE CONDITION:    The patient tolerated the procedure well.  The patient was transported to the recovery area without difficulties.  The patient was discharged to home under the care of family in stable and satisfactory condition.    PLAN OF CARE:  The patient was given our standard instruction sheet.  The patient will Return to clinic 4-6 wks.  The patient will resume all medications as per the medication reconciliation sheet.

## 2025-02-21 NOTE — TELEPHONE ENCOUNTER
"    Caller: Colby Alvarado \"NONA\"    Relationship: Self    Best call back number: [phone]    Requested Prescriptions: NORCO 10/325 MG     Pharmacy where request should be sent:  [facility] [phone]    Last office visit with prescribing clinician: Visit date not found   Last telemedicine visit with prescribing clinician: Visit date not found   Next office visit with prescribing clinician: Visit date not found     Does the patient have less than a 3 day supply:  [x] Yes  [] No  "

## 2025-02-26 ENCOUNTER — TELEPHONE (OUTPATIENT)
Dept: PAIN MEDICINE | Facility: CLINIC | Age: 60
End: 2025-02-26

## 2025-02-26 NOTE — TELEPHONE ENCOUNTER
"    Hub staff attempted to follow warm transfer process and was unsuccessful     Caller: Colby Alvarado \"NONA\"    Relationship to patient: Self    Best call back number: [phone]    Patient is needing: PATIENT STATES HE CALLED THE HOSPITAL ABOUT GETTING HIS BLOOD WORK RESULTS, BUT THEY NEVER DID THE TESTING. HE STATES THEY BRIE HIS BLOOD BUT NEVER SENT IT OFF. HE IS ASKING TO SPEAK WITH SOMEONE ABOUT GETTING A NEW ORDER PLEASE.      "

## 2025-02-27 DIAGNOSIS — Z79.1 NSAID LONG-TERM USE: Primary | ICD-10-CM

## 2025-02-28 ENCOUNTER — TELEPHONE (OUTPATIENT)
Dept: CARDIOLOGY | Facility: CLINIC | Age: 60
End: 2025-02-28
Payer: COMMERCIAL

## 2025-03-03 ENCOUNTER — TELEPHONE (OUTPATIENT)
Dept: CARDIOLOGY | Facility: CLINIC | Age: 60
End: 2025-03-03
Payer: COMMERCIAL

## 2025-03-03 ENCOUNTER — HOSPITAL ENCOUNTER (OUTPATIENT)
Dept: CARDIOLOGY | Facility: HOSPITAL | Age: 60
Discharge: HOME | End: 2025-03-03
Payer: COMMERCIAL

## 2025-03-03 ENCOUNTER — HOSPITAL ENCOUNTER (OUTPATIENT)
Dept: CARDIOLOGY | Facility: HOSPITAL | Age: 60
Discharge: HOME | End: 2025-03-03
Admitting: INTERNAL MEDICINE
Payer: COMMERCIAL

## 2025-03-03 VITALS
BODY MASS INDEX: 28.06 KG/M2 | WEIGHT: 196 LBS | SYSTOLIC BLOOD PRESSURE: 120 MMHG | DIASTOLIC BLOOD PRESSURE: 70 MMHG | HEART RATE: 75 BPM | HEIGHT: 70 IN

## 2025-03-03 VITALS — HEIGHT: 70 IN | BODY MASS INDEX: 28.09 KG/M2 | WEIGHT: 196.21 LBS

## 2025-03-03 DIAGNOSIS — I10 ESSENTIAL HYPERTENSION: ICD-10-CM

## 2025-03-03 DIAGNOSIS — R07.2 PRECORDIAL PAIN: ICD-10-CM

## 2025-03-03 DIAGNOSIS — I48.0 PAF (PAROXYSMAL ATRIAL FIBRILLATION): ICD-10-CM

## 2025-03-03 LAB
AORTIC ARCH: 2.4 CM
ASCENDING AORTA: 3.1 CM
AV MEAN PRESS GRAD SYS DOP V1V2: 4 MMHG
AV VMAX SYS DOP: 137 CM/SEC
BH CV ECHO MEAS - ACS: 2.21 CM
BH CV ECHO MEAS - AO MAX PG: 7.5 MMHG
BH CV ECHO MEAS - AO ROOT DIAM: 3.7 CM
BH CV ECHO MEAS - AO V2 VTI: 29.5 CM
BH CV ECHO MEAS - AVA(I,D): 2.6 CM2
BH CV ECHO MEAS - EDV(CUBED): 157.5 ML
BH CV ECHO MEAS - EDV(MOD-SP2): 94 ML
BH CV ECHO MEAS - EDV(MOD-SP4): 88 ML
BH CV ECHO MEAS - EF(MOD-SP2): 63.8 %
BH CV ECHO MEAS - EF(MOD-SP4): 55.7 %
BH CV ECHO MEAS - ESV(CUBED): 36.9 ML
BH CV ECHO MEAS - ESV(MOD-SP2): 34 ML
BH CV ECHO MEAS - ESV(MOD-SP4): 39 ML
BH CV ECHO MEAS - FS: 38.4 %
BH CV ECHO MEAS - IVS/LVPW: 0.9 CM
BH CV ECHO MEAS - IVSD: 0.9 CM
BH CV ECHO MEAS - LAT PEAK E' VEL: 11.5 CM/SEC
BH CV ECHO MEAS - LV DIASTOLIC VOL/BSA (35-75): 42.5 CM2
BH CV ECHO MEAS - LV MASS(C)D: 193.3 GRAMS
BH CV ECHO MEAS - LV MAX PG: 6.3 MMHG
BH CV ECHO MEAS - LV MEAN PG: 3 MMHG
BH CV ECHO MEAS - LV SYSTOLIC VOL/BSA (12-30): 18.8 CM2
BH CV ECHO MEAS - LV V1 MAX: 125 CM/SEC
BH CV ECHO MEAS - LV V1 VTI: 24.7 CM
BH CV ECHO MEAS - LVIDD: 5.4 CM
BH CV ECHO MEAS - LVIDS: 3.3 CM
BH CV ECHO MEAS - LVOT AREA: 3.1 CM2
BH CV ECHO MEAS - LVOT DIAM: 1.97 CM
BH CV ECHO MEAS - LVPWD: 1 CM
BH CV ECHO MEAS - MED PEAK E' VEL: 9.2 CM/SEC
BH CV ECHO MEAS - MR MAX PG: 52.8 MMHG
BH CV ECHO MEAS - MR MAX VEL: 363.2 CM/SEC
BH CV ECHO MEAS - MV A DUR: 0.12 SEC
BH CV ECHO MEAS - MV A MAX VEL: 66.4 CM/SEC
BH CV ECHO MEAS - MV DEC SLOPE: 251.5 CM/SEC2
BH CV ECHO MEAS - MV DEC TIME: 0.2 SEC
BH CV ECHO MEAS - MV E MAX VEL: 88.3 CM/SEC
BH CV ECHO MEAS - MV E/A: 1.33
BH CV ECHO MEAS - MV MAX PG: 3.7 MMHG
BH CV ECHO MEAS - MV MEAN PG: 1.96 MMHG
BH CV ECHO MEAS - MV P1/2T: 113.1 MSEC
BH CV ECHO MEAS - MV V2 VTI: 29.5 CM
BH CV ECHO MEAS - MVA(P1/2T): 1.94 CM2
BH CV ECHO MEAS - MVA(VTI): 2.6 CM2
BH CV ECHO MEAS - PA ACC TIME: 0.14 SEC
BH CV ECHO MEAS - PA V2 MAX: 96.2 CM/SEC
BH CV ECHO MEAS - PULM A REVS DUR: 0.11 SEC
BH CV ECHO MEAS - PULM A REVS VEL: 23.1 CM/SEC
BH CV ECHO MEAS - PULM DIAS VEL: 70.3 CM/SEC
BH CV ECHO MEAS - PULM S/D: 0.78
BH CV ECHO MEAS - PULM SYS VEL: 54.8 CM/SEC
BH CV ECHO MEAS - QP/QS: 0.57
BH CV ECHO MEAS - RV MAX PG: 1.36 MMHG
BH CV ECHO MEAS - RV V1 MAX: 58.3 CM/SEC
BH CV ECHO MEAS - RV V1 VTI: 13.4 CM
BH CV ECHO MEAS - RVOT DIAM: 2.03 CM
BH CV ECHO MEAS - SUP REN AO DIAM: 2.1 CM
BH CV ECHO MEAS - SV(LVOT): 75.5 ML
BH CV ECHO MEAS - SV(MOD-SP2): 60 ML
BH CV ECHO MEAS - SV(MOD-SP4): 49 ML
BH CV ECHO MEAS - SV(RVOT): 43.4 ML
BH CV ECHO MEAS - SVI(LVOT): 36.5 ML/M2
BH CV ECHO MEAS - SVI(MOD-SP2): 29 ML/M2
BH CV ECHO MEAS - SVI(MOD-SP4): 23.7 ML/M2
BH CV ECHO MEAS - TAPSE (>1.6): 2.7 CM
BH CV ECHO MEAS - TR MAX PG: 19.8 MMHG
BH CV ECHO MEAS - TR MAX VEL: 222.6 CM/SEC
BH CV ECHO MEASUREMENTS AVERAGE E/E' RATIO: 8.53
BH CV NUCLEAR PRIOR STUDY: 1
BH CV REST NUCLEAR ISOTOPE DOSE: 11 MCI
BH CV STRESS BP STAGE 1: NORMAL
BH CV STRESS COMMENTS STAGE 1: NORMAL
BH CV STRESS DOSE REGADENOSON STAGE 1: 0.4
BH CV STRESS DURATION MIN STAGE 1: 0
BH CV STRESS DURATION SEC STAGE 1: 10
BH CV STRESS HR STAGE 1: 103
BH CV STRESS NUCLEAR ISOTOPE DOSE: 34.6 MCI
BH CV STRESS PROTOCOL 1: NORMAL
BH CV STRESS RECOVERY BP: NORMAL MMHG
BH CV STRESS RECOVERY HR: 86 BPM
BH CV STRESS STAGE 1: 1
BH CV XLRA - RV BASE: 3.6 CM
BH CV XLRA - RV LENGTH: 8.2 CM
BH CV XLRA - RV MID: 2.7 CM
BH CV XLRA - TDI S': 13.8 CM/SEC
LEFT ATRIUM VOLUME INDEX: 22.9 ML/M2
LV EF BIPLANE MOD: 60.1 %
MAXIMAL PREDICTED HEART RATE: 163 BPM
PERCENT MAX PREDICTED HR: 63.19 %
SINUS: 3.4 CM
SPECT HRT GATED+EF W RNC IV: 54 %
STJ: 3.1 CM
STRESS BASELINE BP: NORMAL MMHG
STRESS BASELINE HR: 71 BPM
STRESS PERCENT HR: 74 %
STRESS POST EXERCISE DUR SEC: 10 SEC
STRESS POST PEAK BP: NORMAL MMHG
STRESS POST PEAK HR: 103 BPM
STRESS TARGET HR: 139 BPM

## 2025-03-03 PROCEDURE — 93016 CV STRESS TEST SUPVJ ONLY: CPT | Performed by: STUDENT IN AN ORGANIZED HEALTH CARE EDUCATION/TRAINING PROGRAM

## 2025-03-03 PROCEDURE — 93018 CV STRESS TEST I&R ONLY: CPT | Performed by: STUDENT IN AN ORGANIZED HEALTH CARE EDUCATION/TRAINING PROGRAM

## 2025-03-03 PROCEDURE — 93306 TTE W/DOPPLER COMPLETE: CPT

## 2025-03-03 PROCEDURE — 34310000005 TECHNETIUM TETROFOSMIN KIT: Performed by: INTERNAL MEDICINE

## 2025-03-03 PROCEDURE — 78452 HT MUSCLE IMAGE SPECT MULT: CPT

## 2025-03-03 PROCEDURE — 93017 CV STRESS TEST TRACING ONLY: CPT

## 2025-03-03 PROCEDURE — A9502 TC99M TETROFOSMIN: HCPCS | Performed by: INTERNAL MEDICINE

## 2025-03-03 PROCEDURE — 93306 TTE W/DOPPLER COMPLETE: CPT | Performed by: INTERNAL MEDICINE

## 2025-03-03 PROCEDURE — 78452 HT MUSCLE IMAGE SPECT MULT: CPT | Performed by: STUDENT IN AN ORGANIZED HEALTH CARE EDUCATION/TRAINING PROGRAM

## 2025-03-03 PROCEDURE — 25010000002 REGADENOSON 0.4 MG/5ML SOLUTION: Performed by: INTERNAL MEDICINE

## 2025-03-03 RX ORDER — REGADENOSON 0.08 MG/ML
0.4 INJECTION, SOLUTION INTRAVENOUS
Status: COMPLETED | OUTPATIENT
Start: 2025-03-03 | End: 2025-03-03

## 2025-03-03 RX ADMIN — TETROFOSMIN 1 DOSE: 1.38 INJECTION, POWDER, LYOPHILIZED, FOR SOLUTION INTRAVENOUS at 08:16

## 2025-03-03 RX ADMIN — TETROFOSMIN 1 DOSE: 1.38 INJECTION, POWDER, LYOPHILIZED, FOR SOLUTION INTRAVENOUS at 07:32

## 2025-03-03 RX ADMIN — REGADENOSON 0.4 MG: 0.08 INJECTION, SOLUTION INTRAVENOUS at 08:16

## 2025-03-03 NOTE — TELEPHONE ENCOUNTER
Please let him know that stress test looks good.  His heart is strong and there is no evidence of tightly blocked coronary arteries on the study.  This is unchanged from 2021 stress test.

## 2025-03-03 NOTE — TELEPHONE ENCOUNTER
Reviewed results with Colby Alvarado and patient verbalized understanding of results.    Thank you,  Jess HARGROVE RN  Triage Nurse [facility]  03/03/25    15:22 EST

## 2025-03-04 ENCOUNTER — TELEPHONE (OUTPATIENT)
Dept: CARDIOLOGY | Facility: CLINIC | Age: 60
End: 2025-03-04
Payer: COMMERCIAL

## 2025-03-04 NOTE — TELEPHONE ENCOUNTER
Results and recommendations called to pt.  Instructed to call with any further questions or concerns.  Verbalized understanding.    Pua Lujan RN  Triage Nurse, [facility]  03/04/25 08:19 EST

## 2025-03-04 NOTE — TELEPHONE ENCOUNTER
Please let him know that echocardiogram looks good.  His heart is strong and functioning well.  There is a very small amount of leakage from his mitral valve that is not significant at this time and we will continue to follow this.  Would continue current medications and keep currently scheduled follow-up appointment

## 2025-03-21 ENCOUNTER — OFFICE VISIT (OUTPATIENT)
Dept: PAIN MEDICINE | Facility: CLINIC | Age: 60
End: 2025-03-21
Payer: COMMERCIAL

## 2025-03-21 ENCOUNTER — TELEPHONE (OUTPATIENT)
Dept: PAIN MEDICINE | Facility: CLINIC | Age: 60
End: 2025-03-21

## 2025-03-21 VITALS
HEIGHT: 70 IN | DIASTOLIC BLOOD PRESSURE: 80 MMHG | SYSTOLIC BLOOD PRESSURE: 144 MMHG | TEMPERATURE: 97 F | HEART RATE: 67 BPM | WEIGHT: 193.2 LBS | OXYGEN SATURATION: 98 % | BODY MASS INDEX: 27.66 KG/M2

## 2025-03-21 DIAGNOSIS — Z79.899 HIGH RISK MEDICATION USE: ICD-10-CM

## 2025-03-21 DIAGNOSIS — M54.16 LEFT LUMBAR RADICULOPATHY: ICD-10-CM

## 2025-03-21 DIAGNOSIS — M54.12 CERVICAL RADICULOPATHY: ICD-10-CM

## 2025-03-21 DIAGNOSIS — G89.29 CHRONIC LEFT SHOULDER PAIN: ICD-10-CM

## 2025-03-21 DIAGNOSIS — M25.512 CHRONIC LEFT SHOULDER PAIN: ICD-10-CM

## 2025-03-21 DIAGNOSIS — M51.369 DDD (DEGENERATIVE DISC DISEASE), LUMBAR: ICD-10-CM

## 2025-03-21 DIAGNOSIS — M48.062 LUMBAR STENOSIS WITH NEUROGENIC CLAUDICATION: ICD-10-CM

## 2025-03-21 DIAGNOSIS — M50.20 DISPLACEMENT OF CERVICAL INTERVERTEBRAL DISC WITHOUT MYELOPATHY: ICD-10-CM

## 2025-03-21 DIAGNOSIS — M54.16 LUMBAR RADICULITIS: ICD-10-CM

## 2025-03-21 DIAGNOSIS — Z79.1 NSAID LONG-TERM USE: Primary | ICD-10-CM

## 2025-03-21 DIAGNOSIS — M62.838 MUSCLE SPASM: ICD-10-CM

## 2025-03-21 RX ORDER — ERGOCALCIFEROL 1.25 MG/1
50000 CAPSULE, LIQUID FILLED ORAL
COMMUNITY
Start: 2025-02-27

## 2025-03-21 RX ORDER — HYDROCODONE BITARTRATE AND ACETAMINOPHEN 10; 325 MG/1; MG/1
TABLET ORAL
Qty: 150 TABLET | Refills: 0 | Status: SHIPPED | OUTPATIENT
Start: 2025-03-21

## 2025-03-21 NOTE — PROGRESS NOTES
CHIEF COMPLAINT  Back and neck pain    Subjective   Colby MURALI Alvarado is a 59 y.o. male  who presents to the office for follow-up of procedure.  He completed a Left L4 & L5 TF LESI on 2/21/25 performed by Dr. Aguayo for management of low back and left leg pain. Patient reports 50% ongoing relief from the procedure. He reports that the numbness in his left leg has improved and is not waking him up at night.     Today pain is 6/10VAS in severity. Pain is located in his neck, lower back, and bilateral hips. His main complaint today is axial low back pain that radiates down left lateral leg terminating mid calf.  Describes this pain as an intermittent aching and burning. He notes intermittent tingling to left anterior thigh. Pain is worsened by certain movements, prolonged positions (standing), bending/twisting, and walking long distances. Pain improves with rest, reposition, and medication. Neck pain is stable at this time.      Continues with Hydrocodone 10mg 5/day, Flexeril 10mg PRN, and Meloxicam 15mg daily. Denies any side effects from the regimen, including constipation and somnolence. The regimen helps decrease pain by a moderate amount. He works as a  and this helps gets through the day. ADL's by self. Denies any bowel or bladder changes.      Followed by Dr. Estela Townsend on 2/9/24 for left shoulder pain. He is scheduled to see her again on 4/4/24.      He had a echocardiogram and EKG performed on 3/3/2025, since his last office visit he notes heart palpitations 5-6 times in the last 3 months chest discomfort twice a month.  Echocardiogram performed in 2021 showed ejection fraction of 56%.  Lisinopril was decreased to 10 mg in the a.m. and metoprolol was increased to 100 mg in the morning and 50 mg at night.  He does note some right upper quadrant pain that is mildly tender on exam.  Will discuss further with Dr. Mo, his PCP.     Procedures:  2/21/25 - Left L4 & L5 TF LESI - 50% ongoing  relief  11/6/24 - C5-C6 SHAY - 50% ongoing relief  11/28/23 - C5-C6 SHAY - 60% relief for over 6 months   5/11/23 - Bilateral L3-L5 RFA - 50% ongoing relief  3/16/23 - Bilateral L3-L5 MBB - 80% relief x 24 hours  11/10/22 - bilateral L3-L5 MBB - 80% relief x a few hours  8/23/22 - bilateral L4 LTFESI - 90% relief of radicular symptoms - no relief of back pain  5/19/22 - SHAY - 50% ongoing relief  4/5/22 - SHAY - 80% relief x a couple of weeks     He has a history of lumbar spine surgery 15 years ago with Dr. Moon.     Back Pain  The current episode started more than 1 month ago. The problem occurs daily. The problem is unchanged. The pain is present in the lumbar spine. The quality of the pain is described as aching and burning. The pain radiates to the left thigh and right thigh (bilateral hips, lateral legs). The pain is at a severity of 6/10. The symptoms are aggravated by lying down, standing and sitting (work, prolonged position, lifting). Associated symptoms include numbness (left leg). Pertinent negatives include no abdominal pain, chest pain, dysuria, fever, headaches or weakness. He has tried NSAIDs and analgesics for the symptoms. The treatment provided mild relief.   Neck Pain   This is a chronic problem. The current episode started more than 1 year ago. The problem occurs daily. The problem has been unchanged. The pain is present in the left side, midline and right side. The quality of the pain is described as aching and burning. The pain is at a severity of 5/10 (worse today due to right shoulder pain). The symptoms are aggravated by position and twisting. Associated symptoms include numbness (left leg). Pertinent negatives include no chest pain, fever, headaches or weakness. He has tried NSAIDs, acetaminophen, neck support, muscle relaxants and oral narcotics for the symptoms. The treatment provided mild relief.      PEG Assessment   What number best describes your pain on average in the past  week?8  What number best describes how, during the past week, pain has interfered with your enjoyment of life?0  What number best describes how, during the past week, pain has interfered with your general activity?  5    Review of Pertinent Medical Data ---  MRI OF THE LUMBAR SPINE WITH AND WITHOUT CONTRAST     CLINICAL HISTORY: Degenerative disc disease. Bilateral leg pain. History  of L3-L4 discectomy.     TECHNIQUE: MRI of the lumbar spine was obtained with sagittal pre and  postgadolinium T1, proton-density, and T2-weighted images. Additionally,  there are axial pre and postgadolinium T1 and T2-weighted images through  the lumbar spine.     COMPARISON: Comparison is made to previous outside MRI of the lumbar  spine from [facility] dated 01/09/2013.     FINDINGS:     The conus medullaris terminates at the T12-L1 level and has normal  signal intensity. The visualized distal thoracic spinal cord is  unremarkable.     At T12-L1, there is no significant canal or foraminal stenosis.     At L1-L2, there are degenerative disc changes with adjacent degenerative  endplate marrow edema. Bulging disc material at L1-L2 results in a mild  degree of canal and foraminal narrowing. All of these changes are new  when compared to the prior exam.     At L2-L3, there is a disc bulge which mildly narrows the neural foramina  bilaterally. No significant canal stenosis is identified. The mild  foraminal narrowing is new when compared to the prior exam. A right  posterior lateral annular fissure is identified and new since the prior  study.     At the L3-L4 level, the patient has undergone an interval left  laminectomy and partial facetectomy procedure. There is a disc bulge  with a right posterior lateral annular fissure that is eccentric to the  left and results in a mild degree of canal narrowing, particularly along  the left side of the spinal canal. On the prior study, there was a left  central disc protrusion which is  no longer evident. Bulging disc  material also results in mild foraminal narrowing. Although the left  central disc protrusion is no longer evident, the degree of canal  narrowing is mildly improved with resection of the left central disc  protrusion. The degree of foraminal narrowing is stable.     At L4-L5, there are degenerative disc changes with adjacent degenerative  endplate marrow edema. There is degenerative retrolisthesis of L4 on L5  by approximately 3 mm. There is a new posterior annular fissure at  L4-L5. Bulging disc material at L4-L5 results in a mild-to-moderate  degree of bilateral foraminal narrowing. Mild canal narrowing and left  lateral recess narrowing is appreciated secondary to a disc bulge. When  compared to the prior study, the degenerative disc changes and  degenerative endplate marrow edema is new. The mild degree of canal  narrowing and mild-to-moderate degree of foraminal narrowing is slightly  more pronounced when compared to the prior study.     At L5-S1, there is a disc osteophyte complex which mildly narrows the  neural foramina and these findings are unchanged. A posterior annular  fissure is noted and is new since the prior study.     IMPRESSION:     When compared to the prior exam dated 01/09/2013, at L1-L2, there are  new degenerative disc changes with new adjacent degenerative endplate  marrow edema. Mild canal and foraminal narrowing at the L1-L2 level is  also new.     In the interval since the prior study, the patient has undergone a left  laminectomy and partial facetectomy procedure at the L3-L4 level. There  is a disc bulge eccentric to the left at L3-L4 which results in a mild  degree of canal and foraminal narrowing. The degree of canal narrowing  is mildly improved in the interval with interval resection of the  previously identified left central disc protrusion. The degree of mild  foraminal narrowing at L3-L4 is stable.     At L4-L5, there is progression of the  degenerative disc change with new  areas of degenerative endplate marrow edema. Degenerative retrolisthesis  of L4 on L5 by approximately 3 mm is noted and is stable. A new  posterior annular fissure is seen at the L4-L5 level. Bulging disc  material results in a mild-to-moderate degree of bilateral foraminal  narrowing and a mild degree of canal narrowing which is more pronounced  when compared to the prior exam.     At L5-S1, a new posterior annular fissure is identified. There are  stable mild bilateral foraminal narrowing secondary to a disc osteophyte  complex.     The remaining degenerative phenomena within the lumbar spine are as  discussed in detail above.     This report was finalized on 7/6/2022 12:25 PM by Dr. Jose Harrell M.D.    The following portions of the patient's history were reviewed and updated as appropriate: allergies, current medications, past family history, past medical history, past social history, past surgical history, and problem list.    Review of Systems   Constitutional:  Positive for fatigue. Negative for activity change, chills and fever.   HENT:  Negative for congestion.    Eyes:  Negative for visual disturbance.   Respiratory:  Negative for chest tightness and shortness of breath.    Cardiovascular:  Negative for chest pain.   Gastrointestinal:  Negative for abdominal pain, constipation and diarrhea.   Genitourinary:  Negative for difficulty urinating and dysuria.   Musculoskeletal:  Positive for back pain and neck pain.   Neurological:  Positive for numbness (left leg). Negative for dizziness, weakness, light-headedness and headaches.   Psychiatric/Behavioral:  Positive for sleep disturbance. Negative for agitation, self-injury and suicidal ideas. The patient is not nervous/anxious.        I have reviewed and confirmed the accuracy of the ROS as documented by the MA/LPN/RN LUIS EDUARDO Leslie    Vitals:    03/21/25 0758   BP: 144/80   Pulse: 67   Temp: 97 °F (36.1 °C)   SpO2:  "98%   Weight: 87.6 kg (193 lb 3.2 oz)   Height: 177.8 cm (70\")   PainSc: 6    PainLoc: Back     Objective   Physical Exam  Constitutional:       Appearance: Normal appearance.   HENT:      Head: Normocephalic.   Cardiovascular:      Rate and Rhythm: Normal rate and regular rhythm.   Pulmonary:      Effort: Pulmonary effort is normal.      Breath sounds: Normal breath sounds.   Musculoskeletal:      Right shoulder: Tenderness and bony tenderness present. Decreased range of motion.      Left shoulder: Tenderness, bony tenderness and crepitus present. Decreased range of motion.      Cervical back: Normal range of motion. Tenderness present. No bony tenderness. No pain with movement.      Lumbar back: Tenderness and bony tenderness present. Decreased range of motion. Positive left straight leg raise test. Negative right straight leg raise test.      Right hip: Tenderness and bony tenderness present.   Skin:     General: Skin is warm and dry.      Capillary Refill: Capillary refill takes less than 2 seconds.   Neurological:      General: No focal deficit present.      Mental Status: He is alert and oriented to person, place, and time.   Psychiatric:         Mood and Affect: Mood normal.         Speech: Speech normal.         Behavior: Behavior normal.         Thought Content: Thought content normal.         Cognition and Memory: Cognition normal.       Assessment & Plan   Diagnoses and all orders for this visit:    1. NSAID long-term use (Primary)    2. Displacement of cervical intervertebral disc without myelopathy  -     HYDROcodone-acetaminophen (NORCO)  MG per tablet; Take 1 tablet every 4-6 hours as needed for pain. Max 5/day. 30 day supply. DNF 3/27/25  Dispense: 150 tablet; Refill: 0    3. DDD (degenerative disc disease), lumbar  -     HYDROcodone-acetaminophen (NORCO)  MG per tablet; Take 1 tablet every 4-6 hours as needed for pain. Max 5/day. 30 day supply. DNF 3/27/25  Dispense: 150 tablet; Refill: " 0    4. Chronic left shoulder pain    5. Lumbar stenosis with neurogenic claudication    6. Left lumbar radiculopathy    7. Lumbar radiculitis    8. Cervical radiculopathy    9. Muscle spasm    10. High risk medication use      Colby Alvarado reports a pain score of 6.  Given his pain assessment as noted, treatment options were discussed and the following options were decided upon as a follow-up plan to address the patient's pain: continuation of current treatment plan for pain and prescription for opiod analgesics.    --- The urine drug screen confirmation from 11/25/24 has been reviewed and the result is appropriate based on patient history and CARI report  --- The patient signed an updated copy of the controlled substance agreement on 5/25/24  --- Continue Flexeril. No refill needed at this time.   --- PCP to take over management of Meloxicam as patient has lab work every 6 months that  would allow for better monitoring of long term NSAID use  --- Continue Hydrocodone. DNF 3/27/25 (dated out to realign refills - February prescription was due to be filled on 2/25/25 but was filled on 2/23/25. January refill was also filled a few days early per CARI report). Patient appears stable with current regimen. No adverse effects. Regarding continuation of opioids, there is no evidence of aberrant behavior or any red flags.  The patient continues with appropriate response to opioid therapy. ADL's remain intact by self.   --- Follow up 2 months or sooner if needed        CARI REPORT  As part of the patient's treatment plan, I am prescribing controlled substances. The patient has been made aware of appropriate use of such medications, including potential risk of somnolence, limited ability to drive and/or work safely, and the potential for dependence or overdose. It has also been made clear that these medications are for use by this patient only, without concomitant use of alcohol or other substances unless  prescribed.     Patient has completed prescribing agreement detailing terms of continued prescribing of controlled substances, including monitoring CARI reports, urine drug screening, and pill counts if necessary. The patient is aware that inappropriate use will results in cessation of prescribing such medications.    As the clinician, I personally reviewed the CARI from 3/21/25 while the patient was in the office today.    History and physical exam exhibit continued safe and appropriate use of controlled substances.    Dictated utilizing Dragon dictation.

## 2025-03-21 NOTE — TELEPHONE ENCOUNTER
Unfortunately, it was still due to be filled on 2/25/25 and they filled it early. It was also filled two days early in January so refill will be dated out appropriately. Our office policy is to refill medication every 30 days.

## 2025-03-21 NOTE — TELEPHONE ENCOUNTER
Gela left him a message in regards to this. February refill was due on 2/25/25 but was filled by the pharmacy on 2/21/25. For this reason, I had to date out this refill. Can you please verify the refill date with his pharmacy?

## 2025-03-21 NOTE — PROGRESS NOTES
Patient: Colby Alvarado  YOB: 1965  Date of Service: 3/21/2025    Chief Complaints: Left shoulder pain    Subjective:    History of Present Illness: Pt is seen in the office today with complaints of left shoulder pain has been about a year since I saw him he has had a right rotator cuff repair about 5 or 6 years ago the left ones been bothering him for several months he does see Dr. Aguayo who does some spine injections but the shoulder is really bothered him.        Allergies: No Known Allergies    Medications:   Home Medications:  Current Outpatient Medications on File Prior to Visit   Medication Sig    atorvastatin (LIPITOR) 80 MG tablet Take 1 tablet by mouth Every Night.    cetirizine (zyrTEC) 10 MG tablet Take 1 tablet by mouth Daily.    cyclobenzaprine (FLEXERIL) 10 MG tablet TAKE 1 TABLET BY MOUTH TWICE DAILY AS NEEDED FOR MUSCLE SPASMS    Galcanezumab-gnlm (Emgality, 300 MG Dose,) 100 MG/ML solution prefilled syringe As Needed.    HYDROcodone-acetaminophen (NORCO)  MG per tablet Take 1 tablet every 4-6 hours as needed for pain. Max 5/day. 30 day supply. DNF 3/27/25    lisinopril (PRINIVIL,ZESTRIL) 10 MG tablet Take 1 tablet by mouth Daily.    meloxicam (MOBIC) 15 MG tablet TAKE 1 TABLET BY MOUTH DAILY    metoprolol succinate XL (TOPROL-XL) 100 MG 24 hr tablet Take one tablet po q am and half tablet po q pm    SUMAtriptan Succinate (IMITREX) 6 MG/0.5ML injection sumatriptan 6 mg/0.5 mL subcutaneous pen injector    vitamin D (ERGOCALCIFEROL) 1.25 MG (86593 UT) capsule capsule Take 1 capsule by mouth Every 7 (Seven) Days.    zolpidem (AMBIEN) 10 MG tablet Take 1 tablet by mouth At Night As Needed for Sleep.    [DISCONTINUED] HYDROcodone-acetaminophen (NORCO)  MG per tablet Take 1 tablet every 4-6 hours as needed for pain. Max 5/day. 30 day supply. DNF 2/25/2025     No current facility-administered medications on file prior to visit.     Current Medications:  Scheduled  Meds:  Continuous Infusions:No current facility-administered medications for this visit.    PRN Meds:.    I have reviewed the patient's medical history in detail and updated the computerized patient record.  Review and summarization of old records include:    Past Medical History:   Diagnosis Date    Anxiety     Arthritis of back     Atrial fibrillation     Cervical disc disorder 2019    Chronic pain     Chronic pain disorder 2000    Cluster headache 5-    Depression     Extremity pain ?    H/O cluster headache     Hearing loss     Hyperlipidemia     Hypertension     Insomnia     Joint pain     Low back pain 2021    Lumbar stenosis with neurogenic claudication 1/24/2025    Lumbosacral disc disease ?    Neck pain 2019    CAL on CPAP 11/23/2020    Home sleep study.  KALEIGH 6.4 events per hour.  Low oxygen saturation 84%    Periarthritis of shoulder     Seasonal allergies         Past Surgical History:   Procedure Laterality Date    BACK SURGERY      CARDIAC ABLATION  07/31/2019    CERVICAL EPIDURAL Bilateral 04/05/2022    Procedure: CERVICAL EPIDURAL;  Surgeon: Cruz Aguayo MD;  Location: [facility];  Service: Pain Management;  Laterality: Bilateral;    CERVICAL EPIDURAL N/A 05/19/2022    Procedure: CERVICAL EPIDURAL;  Surgeon: Cruz Aguayo MD;  Location: [facility];  Service: Pain Management;  Laterality: N/A;    CERVICAL EPIDURAL N/A 11/28/2023    Procedure: CERVICAL EPIDURAL STEROID INJECTION CPT: 73038;  Surgeon: Cruz Aguayo MD;  Location: [facility];  Service: Pain Management;  Laterality: N/A;    EPIDURAL Bilateral 08/23/2022    Procedure: L4 TRANSFORAMINAL LUMBAR EPIDURAL STEROID INJECTION;  Surgeon: Cruz Aguayo MD;  Location: [facility];  Service: Pain Management;  Laterality: Bilateral;    EPIDURAL Left 2/21/2025    Procedure: LEFT L4 & L5 TRANSFORAMINAL LUMBAR EPIDURAL STEROID INJECTION CPT: 31586, 09174;  Surgeon: Cruz Aguayo MD;  Location: [facility];   Service: Pain Management;  Laterality: Left;    EYE MUSCLE SURGERY Left     HAND SURGERY Right     open fracture plate    HARDWARE REMOVAL Right     rt hand  plate    HERNIA REPAIR      MEDIAL BRANCH BLOCK Bilateral 11/10/2022    Procedure: MEDIAL BRANCH BLOCK LUMBAR bilateral L3-L5;  Surgeon: Cruz Aguayo MD;  Location: [facility] OR;  Service: Pain Management;  Laterality: Bilateral;    MEDIAL BRANCH BLOCK Bilateral 03/16/2023    Procedure: BILATEAL L3-L5 LUMBAR MEDIAL BRANCH BLOCK;  Surgeon: Cruz Aguayo MD;  Location: [facility] MA[facility] OR;  Service: Pain Management;  Laterality: Bilateral;    NASAL FRACTURE SURGERY      RADIOFREQUENCY ABLATION Bilateral 05/11/2023    Procedure: BILATERAL L3-L5 RADIOFREQUENCY ABLATION LUMBAR CPT: 80559, 89332;  Surgeon: Cruz Aguayo MD;  Location: [facility] MA[facility] OR;  Service: Pain Management;  Laterality: Bilateral;    SHOULDER ARTHROSCOPY W/ ROTATOR CUFF REPAIR Right 03/13/2019    Procedure: SHOULDER ARTHROSCOPY, DEBRIDEIMENT OF LABRUM AND SUBSCAP, DECEOMPRESSION, DISTAL CLAVICLE EXCISION,  WITH MINI OPEN ROTATOR CUFF REPAIR;  Surgeon: Estela Townsend MD;  Location: [facility] [place];  Service: Orthopedics    SHOULDER SURGERY      SPINE SURGERY      VASECTOMY          Social History     Occupational History    Not on file   Tobacco Use    Smoking status: Some Days     Current packs/day: 1.00     Average packs/day: 1 pack/day for 42.2 years (42.2 ttl pk-yrs)     Types: Cigarettes     Start date: 1983     Passive exposure: Current    Smokeless tobacco: Former     Types: Snuff   Vaping Use    Vaping status: Some Days    Substances: Nicotine    Devices: Pre-filled or refillable cartridge   Substance and Sexual Activity    Alcohol use: Not Currently     Alcohol/week: 16.0 standard drinks of alcohol     Types: 16 Cans of beer per week     Comment: caffiene    Drug use: No    Sexual activity: Yes     Partners: Female     Birth control/protection: Vasectomy      Social History      Social History Narrative    6 sodas per day        Family History   Problem Relation Age of Onset    Emphysema Mother     Hypertension Mother     Heart disease Mother     Cancer Mother         Oral    COPD Mother     Migraines Mother     Hypertension Sister     No Known Problems Brother     Heart attack Maternal Grandmother     Emphysema Maternal Grandmother     Heart disease Maternal Grandmother     Stroke Maternal Grandmother     Heart attack Maternal Grandfather     Emphysema Maternal Grandfather     Sleep apnea Other     Hypertension Other     Heart disease Other     Lung disease Other     Malig Hyperthermia Neg Hx        ROS: 14 point review of systems was performed and was negative except for documented findings in HPI and today's encounter.     Allergies: No Known Allergies  Constitutional:  Denies fever, shaking or chills   Eyes:  Denies change in visual acuity   HENT:  Denies nasal congestion or sore throat   Respiratory:  Denies cough or shortness of breath   Cardiovascular:  Denies chest pain or severe LE edema   GI:  Denies abdominal pain, nausea, vomiting, bloody stools or diarrhea   Musculoskeletal:  Numbness, tingling, or loss of motor function only as noted above in history of present illness.  : Denies painful urination or hematuria  Integument:  Denies rash, lesion or ulceration   Neurologic:  Denies headache or focal weakness  Endocrine:  Denies lymphadenopathy  Psych:  Denies confusion or change in mental status   Hem:  Denies active bleeding      Physical Exam: 59 y.o. male  Wt Readings from Last 3 Encounters:   03/21/25 87.6 kg (193 lb 3.2 oz)   03/03/25 88.9 kg (196 lb)   03/03/25 89 kg (196 lb 3.4 oz)       There is no height or weight on file to calculate BMI.    There were no vitals filed for this visit.  Vital signs reviewed.   General Appearance:    Alert, cooperative, in no acute distress                    Ortho exam    Physical exam of the left shoulder reveals no overlying skin  changes no lymphedema no lymphadenopathy.  Patient has active flexion 180 with mild symptoms abduction is similar external rotation is to 50 and internal rotation to the lower lumbar spine with mild symptoms.  Patient has good rotator cuff strength 4+ over 5 with isometric strength testing with pain.  Patient has a positive impingement and a positive Cheney sign.  Patient has good cervical range of motion which is full and asymptomatic no radicular symptoms.  Patient has a normal elbow exam.  Good distal pulses are presentPatient has pain with overhead activity and a positive Neer sign and a positive empty can sign  They have a positive drop arm any definitive painful arc          X-ray AP scapular Y and x-ray lateral left shoulder were taken to evaluate his symptoms I did compare to x-rays done about a year ago he does have evidence of degenerative changes he has some changes at the tuberosity    Assessment: Left shoulder I think this is rotator cuff in origin I does have some degenerative changes to his symptoms seem more cuff plan is to proceed with a subacromial injection and I will see him back in a month if he still symptomatic I might consider a glenohumeral injection    Plan:   Follow up as indicated.  Ice, elevate, and rest as needed.  Discussed conservative measures of pain control including ice, bracing.    Large Joint Arthrocentesis: L subacromial bursa  Date/Time: 4/4/2025 9:39 AM  Consent given by: patient  Site marked: site marked  Timeout: Immediately prior to procedure a time out was called to verify the correct patient, procedure, equipment, support staff and site/side marked as required   Supporting Documentation  Indications: pain   Procedure Details  Location: shoulder - L subacromial bursa  Preparation: Patient was prepped and draped in the usual sterile fashion  Needle gauge: 21G.  Approach: posterior  Medications administered: 80 mg methylPREDNISolone acetate 80 MG/ML; 2 mL lidocaine PF 1% 1  %  Patient tolerance: patient tolerated the procedure well with no immediate complications       Estela Townsend M.D.

## 2025-03-21 NOTE — TELEPHONE ENCOUNTER
"  Caller: Colby Alvarado \"NONA\"    Relationship: Self    Best call back number: [phone]    What was the call regarding: PT STATES HE WAS ADVISED BY BRYAN DE ANDA AT [facility] TODAY THAT HYDROCODONE RX WOULD BE SENT TO PT'S PHARMACY TO BE FILLED ON 03/23/25. HOWEVER, [facility] STATES THAT THEY ARE UNABLE TO FILL RX UNTIL 03/27/25, PER PROVIDER NOTES ON RX. PLEASE CONTACT PT TO DISCUSS AND UPDATE RX TO BE FILLED 03/23/25.  "

## 2025-04-04 ENCOUNTER — OFFICE VISIT (OUTPATIENT)
Dept: ORTHOPEDIC SURGERY | Facility: CLINIC | Age: 60
End: 2025-04-04
Payer: COMMERCIAL

## 2025-04-04 VITALS — BODY MASS INDEX: 27.49 KG/M2 | TEMPERATURE: 97.5 F | HEIGHT: 70 IN | WEIGHT: 192 LBS

## 2025-04-04 DIAGNOSIS — M19.019 GLENOHUMERAL ARTHRITIS: ICD-10-CM

## 2025-04-04 DIAGNOSIS — R52 PAIN: Primary | ICD-10-CM

## 2025-04-04 DIAGNOSIS — M75.102 TEAR OF LEFT ROTATOR CUFF, UNSPECIFIED TEAR EXTENT, UNSPECIFIED WHETHER TRAUMATIC: ICD-10-CM

## 2025-04-04 RX ADMIN — LIDOCAINE HYDROCHLORIDE 2 ML: 10 INJECTION, SOLUTION EPIDURAL; INFILTRATION; INTRACAUDAL; PERINEURAL at 09:39

## 2025-04-04 RX ADMIN — METHYLPREDNISOLONE ACETATE 80 MG: 80 INJECTION, SUSPENSION INTRA-ARTICULAR; INTRALESIONAL; INTRAMUSCULAR; SOFT TISSUE at 09:39

## 2025-04-06 RX ORDER — METHYLPREDNISOLONE ACETATE 80 MG/ML
80 INJECTION, SUSPENSION INTRA-ARTICULAR; INTRALESIONAL; INTRAMUSCULAR; SOFT TISSUE
Status: COMPLETED | OUTPATIENT
Start: 2025-04-04 | End: 2025-04-04

## 2025-04-06 RX ORDER — LIDOCAINE HYDROCHLORIDE 10 MG/ML
2 INJECTION, SOLUTION EPIDURAL; INFILTRATION; INTRACAUDAL; PERINEURAL
Status: COMPLETED | OUTPATIENT
Start: 2025-04-04 | End: 2025-04-04

## 2025-04-09 DIAGNOSIS — M62.838 MUSCLE SPASM: ICD-10-CM

## 2025-04-10 RX ORDER — CYCLOBENZAPRINE HCL 10 MG
10 TABLET ORAL 2 TIMES DAILY PRN
Qty: 60 TABLET | Refills: 2 | Status: SHIPPED | OUTPATIENT
Start: 2025-04-10

## 2025-04-24 DIAGNOSIS — M50.20 DISPLACEMENT OF CERVICAL INTERVERTEBRAL DISC WITHOUT MYELOPATHY: ICD-10-CM

## 2025-04-24 DIAGNOSIS — M51.369 DDD (DEGENERATIVE DISC DISEASE), LUMBAR: ICD-10-CM

## 2025-04-24 RX ORDER — HYDROCODONE BITARTRATE AND ACETAMINOPHEN 10; 325 MG/1; MG/1
TABLET ORAL
Qty: 150 TABLET | Refills: 0 | Status: SHIPPED | OUTPATIENT
Start: 2025-04-24

## 2025-04-24 NOTE — TELEPHONE ENCOUNTER
"    Caller: Colby Alvarado \"NONA\"    Relationship: Self    Best call back number:     Requested Prescriptions:   HYDROcodone-acetaminophen (NORCO)  MG per tablet     Pharmacy where request should be sent:   [id number] - [place], [place] - [address] - [phone]  - [phone]   [address] [address]  Phone: [phone]  Fax: [phone]        Last office visit with prescribing clinician: 3/21/2025   Last telemedicine visit with prescribing clinician: Visit date not found   Next office visit with prescribing clinician: 5/22/2025     Additional details provided by patient: PHARMACY CLOSED SATURDAY AND PATIENT CAN'T  SUNDAY DUE TO WORK WOULD LIKE FILLED TOMORROW ON FRIDAY     Does the patient have less than a 3 day supply:  [x] Yes  [] No    Would you like a call back once the refill request has been completed: [] Yes [] No    If the office needs to give you a call back, can they leave a voicemail: [] Yes [] No    Jak William Rep   04/24/25 10:11 EDT           "

## 2025-04-24 NOTE — TELEPHONE ENCOUNTER
Reviewed WEST and CARI. Both updated and appropriate. Refill appropriate.  Ok to fill tomorrow but will have date May refill out to 5/26/25 to align refills.

## 2025-04-24 NOTE — TELEPHONE ENCOUNTER
Medication Refill Request    Date of phone call: 25    Medication being requested: Norco  mg  si tab po 4-6 hrs prn  Qty: 150    Date of last visit: 3/21/25    Date of last refill:     CARI up to date?:     Next Follow up?: 25    Any new pertinent information? (i.e, new medication allergies, new use of medications, change in patient's health or condition, non-compliance or inconsistency with prescribing agreement?):

## 2025-04-30 RX ORDER — METOPROLOL SUCCINATE 100 MG/1
TABLET, EXTENDED RELEASE ORAL
Qty: 135 TABLET | Refills: 1 | Status: SHIPPED | OUTPATIENT
Start: 2025-04-30

## 2025-05-22 ENCOUNTER — OFFICE VISIT (OUTPATIENT)
Dept: PAIN MEDICINE | Facility: CLINIC | Age: 60
End: 2025-05-22
Payer: COMMERCIAL

## 2025-05-22 VITALS
BODY MASS INDEX: 26.86 KG/M2 | WEIGHT: 187.6 LBS | DIASTOLIC BLOOD PRESSURE: 78 MMHG | HEIGHT: 70 IN | OXYGEN SATURATION: 93 % | TEMPERATURE: 97.8 F | HEART RATE: 67 BPM | SYSTOLIC BLOOD PRESSURE: 128 MMHG

## 2025-05-22 DIAGNOSIS — M54.16 LUMBAR RADICULITIS: ICD-10-CM

## 2025-05-22 DIAGNOSIS — M51.360 DEGENERATION OF INTERVERTEBRAL DISC OF LUMBAR REGION WITH DISCOGENIC BACK PAIN: ICD-10-CM

## 2025-05-22 DIAGNOSIS — M50.20 DISPLACEMENT OF CERVICAL INTERVERTEBRAL DISC WITHOUT MYELOPATHY: Primary | ICD-10-CM

## 2025-05-22 DIAGNOSIS — M51.369 DDD (DEGENERATIVE DISC DISEASE), LUMBAR: ICD-10-CM

## 2025-05-22 DIAGNOSIS — M62.838 MUSCLE SPASM: ICD-10-CM

## 2025-05-22 DIAGNOSIS — G89.29 CHRONIC LEFT SHOULDER PAIN: ICD-10-CM

## 2025-05-22 DIAGNOSIS — M54.12 CERVICAL RADICULOPATHY: ICD-10-CM

## 2025-05-22 DIAGNOSIS — M25.512 CHRONIC LEFT SHOULDER PAIN: ICD-10-CM

## 2025-05-22 DIAGNOSIS — M54.16 LEFT LUMBAR RADICULOPATHY: ICD-10-CM

## 2025-05-22 DIAGNOSIS — M48.062 LUMBAR STENOSIS WITH NEUROGENIC CLAUDICATION: ICD-10-CM

## 2025-05-22 LAB
POC AMPHETAMINES: NEGATIVE
POC BARBITURATES: NEGATIVE
POC BENZODIAZEPHINES: NEGATIVE
POC BUPRENORPHINE: NEGATIVE
POC COCAINE: NEGATIVE
POC METHADONE: NEGATIVE
POC METHAMPHETAMINE SCREEN URINE: NEGATIVE
POC OPIATES: POSITIVE
POC OXYCODONE: NEGATIVE
POC PHENCYCLIDINE: NEGATIVE
POC PROPOXYPHENE: NEGATIVE
POC THC: NEGATIVE
POC TRICYCLIC ANTIDEPRESSANTS: NEGATIVE

## 2025-05-22 PROCEDURE — 99214 OFFICE O/P EST MOD 30 MIN: CPT

## 2025-05-22 RX ORDER — HYDROCODONE BITARTRATE AND ACETAMINOPHEN 10; 325 MG/1; MG/1
TABLET ORAL
Qty: 150 TABLET | Refills: 0 | Status: SHIPPED | OUTPATIENT
Start: 2025-05-22 | End: 2025-05-23 | Stop reason: SDUPTHER

## 2025-05-22 RX ORDER — CHOLECALCIFEROL (VITAMIN D3) 25 MCG
1000 TABLET ORAL DAILY
COMMUNITY

## 2025-05-22 NOTE — PROGRESS NOTES
CHIEF COMPLAINT  Back and neck pain    Subjective   Colby Alvarado is a 59 y.o. male  who presents for follow-up.  He has a history of chronic neck and back pain. He reports that his pain remained consistent since his last office visit.     Today pain is 5/10VAS in severity. Pain is located in his neck, lower back, and bilateral hips. Describes this pain as an intermittent aching and burning. He notes intermittent tingling to left anterior thigh. Pain is worsened by certain movements, prolonged positions (standing), bending/twisting, and walking long distances. Pain improves with rest, reposition, and medication. Neck pain is stable at this time.      Continues with Hydrocodone 10mg 5/day, Flexeril 10mg PRN, and Meloxicam 15mg daily. Denies any side effects from the regimen, including constipation and somnolence. The regimen helps decrease pain by a moderate amount. He works as a  and this helps gets through the day. ADL's by self. Denies any bowel or bladder changes.      Followed by Dr. Estela Townsend - last seen on 4/4/25 - received left subacromial bursa injection at this time    Procedures:  2/21/25 - Left L4 & L5 TF LESI - 50% ongoing relief  11/6/24 - C5-C6 SHAY - 50% ongoing relief  11/28/23 - C5-C6 SHAY - 60% relief for over 6 months   5/11/23 - Bilateral L3-L5 RFA - 50% ongoing relief  3/16/23 - Bilateral L3-L5 MBB - 80% relief x 24 hours  11/10/22 - bilateral L3-L5 MBB - 80% relief x a few hours  8/23/22 - bilateral L4 LTFESI - 90% relief of radicular symptoms - no relief of back pain  5/19/22 - SHAY - 50% ongoing relief  4/5/22 - SHAY - 80% relief x a couple of weeks     He has a history of lumbar spine surgery 15 years ago with Dr. Moon.     Back Pain  Onset:  More than 1 month ago  Frequency:  Daily  Progression since onset:  Unchanged (pan has remained consistent since his last office visit)  Pain location:  Lumbar spine  Pain quality:  Aching and burning  Radiates to:  Left  thigh and right thigh (bilateral hips, lateral legs)  Pain-numeric:  6/10 and 5/10  Aggravated by:  Lying down, standing and sitting (work, prolonged position, lifting)  Associated symptoms: headaches (migraines)    Associated symptoms: no abdominal pain, no chest pain, no dysuria, no fever, no numbness and no weakness    Treatments tried:  NSAIDs and analgesics  Improvement on treatment:  Mild  Neck Pain   This is a chronic problem. The current episode started more than 1 year ago. The problem occurs daily. The problem has been unchanged (pain has remained consistent sine his last office visit). The pain is present in the left side, midline and right side. The quality of the pain is described as aching and burning. The pain is at a severity of 5/10 (worse today due to right shoulder pain). The symptoms are aggravated by position and twisting. Associated symptoms include headaches (migraines). Pertinent negatives include no chest pain, fever, numbness or weakness. He has tried NSAIDs, acetaminophen, neck support, muscle relaxants and oral narcotics for the symptoms. The treatment provided mild relief.     PEG Assessment   What number best describes your pain on average in the past week?7  What number best describes how, during the past week, pain has interfered with your enjoyment of life?4  What number best describes how, during the past week, pain has interfered with your general activity?  5    Review of Pertinent Medical Data ---  MRI OF THE LUMBAR SPINE WITH AND WITHOUT CONTRAST     CLINICAL HISTORY: Degenerative disc disease. Bilateral leg pain. History  of L3-L4 discectomy.     TECHNIQUE: MRI of the lumbar spine was obtained with sagittal pre and  postgadolinium T1, proton-density, and T2-weighted images. Additionally,  there are axial pre and postgadolinium T1 and T2-weighted images through  the lumbar spine.     COMPARISON: Comparison is made to previous outside MRI of the lumbar  spine from [facility] and  Open MRI dated 01/09/2013.     FINDINGS:     The conus medullaris terminates at the T12-L1 level and has normal  signal intensity. The visualized distal thoracic spinal cord is  unremarkable.     At T12-L1, there is no significant canal or foraminal stenosis.     At L1-L2, there are degenerative disc changes with adjacent degenerative  endplate marrow edema. Bulging disc material at L1-L2 results in a mild  degree of canal and foraminal narrowing. All of these changes are new  when compared to the prior exam.     At L2-L3, there is a disc bulge which mildly narrows the neural foramina  bilaterally. No significant canal stenosis is identified. The mild  foraminal narrowing is new when compared to the prior exam. A right  posterior lateral annular fissure is identified and new since the prior  study.     At the L3-L4 level, the patient has undergone an interval left  laminectomy and partial facetectomy procedure. There is a disc bulge  with a right posterior lateral annular fissure that is eccentric to the  left and results in a mild degree of canal narrowing, particularly along  the left side of the spinal canal. On the prior study, there was a left  central disc protrusion which is no longer evident. Bulging disc  material also results in mild foraminal narrowing. Although the left  central disc protrusion is no longer evident, the degree of canal  narrowing is mildly improved with resection of the left central disc  protrusion. The degree of foraminal narrowing is stable.     At L4-L5, there are degenerative disc changes with adjacent degenerative  endplate marrow edema. There is degenerative retrolisthesis of L4 on L5  by approximately 3 mm. There is a new posterior annular fissure at  L4-L5. Bulging disc material at L4-L5 results in a mild-to-moderate  degree of bilateral foraminal narrowing. Mild canal narrowing and left  lateral recess narrowing is appreciated secondary to a disc bulge. When  compared to the  prior study, the degenerative disc changes and  degenerative endplate marrow edema is new. The mild degree of canal  narrowing and mild-to-moderate degree of foraminal narrowing is slightly  more pronounced when compared to the prior study.     At L5-S1, there is a disc osteophyte complex which mildly narrows the  neural foramina and these findings are unchanged. A posterior annular  fissure is noted and is new since the prior study.     IMPRESSION:     When compared to the prior exam dated 01/09/2013, at L1-L2, there are  new degenerative disc changes with new adjacent degenerative endplate  marrow edema. Mild canal and foraminal narrowing at the L1-L2 level is  also new.     In the interval since the prior study, the patient has undergone a left  laminectomy and partial facetectomy procedure at the L3-L4 level. There  is a disc bulge eccentric to the left at L3-L4 which results in a mild  degree of canal and foraminal narrowing. The degree of canal narrowing  is mildly improved in the interval with interval resection of the  previously identified left central disc protrusion. The degree of mild  foraminal narrowing at L3-L4 is stable.     At L4-L5, there is progression of the degenerative disc change with new  areas of degenerative endplate marrow edema. Degenerative retrolisthesis  of L4 on L5 by approximately 3 mm is noted and is stable. A new  posterior annular fissure is seen at the L4-L5 level. Bulging disc  material results in a mild-to-moderate degree of bilateral foraminal  narrowing and a mild degree of canal narrowing which is more pronounced  when compared to the prior exam.     At L5-S1, a new posterior annular fissure is identified. There are  stable mild bilateral foraminal narrowing secondary to a disc osteophyte  complex.     The remaining degenerative phenomena within the lumbar spine are as  discussed in detail above.     This report was finalized on 7/6/2022 12:25 PM by Dr. Jose Harrell,  "M.D.    The following portions of the patient's history were reviewed and updated as appropriate: allergies, current medications, past family history, past medical history, past social history, past surgical history, and problem list.    Review of Systems   Constitutional:  Negative for activity change, chills, fatigue and fever.   HENT:  Negative for congestion.    Eyes:  Negative for visual disturbance.   Respiratory:  Negative for chest tightness and shortness of breath.    Cardiovascular:  Negative for chest pain.   Gastrointestinal:  Negative for abdominal pain, constipation and diarrhea.   Genitourinary:  Negative for difficulty urinating and dysuria.   Musculoskeletal:  Positive for back pain.   Neurological:  Positive for headaches (migraines). Negative for dizziness, weakness, light-headedness and numbness.   Psychiatric/Behavioral:  Positive for sleep disturbance. Negative for agitation, self-injury and suicidal ideas. The patient is not nervous/anxious.      I have reviewed and confirmed the accuracy of the ROS as documented by the MA/LPN/RN LUIS EDUARDO Leslie    Vitals:    05/22/25 0859   BP: 128/78   Pulse: 67   Temp: 97.8 °F (36.6 °C)   SpO2: 93%   Weight: 85.1 kg (187 lb 9.6 oz)   Height: 177.8 cm (70\")   PainSc: 5    PainLoc: Neck     Objective   Physical Exam  Constitutional:       Appearance: Normal appearance.   HENT:      Head: Normocephalic.   Cardiovascular:      Rate and Rhythm: Normal rate and regular rhythm.   Pulmonary:      Effort: Pulmonary effort is normal.      Breath sounds: Normal breath sounds.   Musculoskeletal:      Right shoulder: Tenderness and bony tenderness present. Decreased range of motion.      Left shoulder: Tenderness, bony tenderness and crepitus present. Decreased range of motion.      Cervical back: Normal range of motion. Tenderness present. No bony tenderness. No pain with movement.      Lumbar back: Tenderness and bony tenderness present. Decreased range of " motion. Positive left straight leg raise test. Negative right straight leg raise test.      Right hip: Tenderness and bony tenderness present.   Skin:     General: Skin is warm and dry.      Capillary Refill: Capillary refill takes less than 2 seconds.   Neurological:      General: No focal deficit present.      Mental Status: He is alert and oriented to person, place, and time.   Psychiatric:         Mood and Affect: Mood normal.         Speech: Speech normal.         Behavior: Behavior normal.         Thought Content: Thought content normal.         Cognition and Memory: Cognition normal.       Assessment & Plan   Diagnoses and all orders for this visit:    1. Displacement of cervical intervertebral disc without myelopathy (Primary)    2. Degeneration of intervertebral disc of lumbar region with discogenic back pain    3. Chronic left shoulder pain    4. Lumbar stenosis with neurogenic claudication    5. Left lumbar radiculopathy    6. Lumbar radiculitis    7. Cervical radiculopathy    8. Muscle spasm      Colby Alvarado reports a pain score of 5.  Given his pain assessment as noted, treatment options were discussed and the following options were decided upon as a follow-up plan to address the patient's pain: continuation of current treatment plan for pain and prescription for opiod analgesics.    --- Routine UDS in office today as part of monitoring requirements for controlled substances.  The specimen was viewed and the immunoassay result reviewed and is +OPI.  This specimen will be sent to [facility] laboratory for confirmation.     --- The patient signed an updated copy of the controlled substance agreement on 5/22/25  --- Continue Flexeril. No refill needed at this time.   --- Continue Hydrocodone. DNF 5/26/25. Patient appears stable with current regimen. No adverse effects. Regarding continuation of opioids, there is no evidence of aberrant behavior or any red flags.  The patient continues with appropriate  response to opioid therapy. ADL's remain intact by self.   --- Follow up 2 months or sooner if needed        CARI REPORT  As part of the patient's treatment plan, I am prescribing controlled substances. The patient has been made aware of appropriate use of such medications, including potential risk of somnolence, limited ability to drive and/or work safely, and the potential for dependence or overdose. It has also been made clear that these medications are for use by this patient only, without concomitant use of alcohol or other substances unless prescribed.     Patient has completed prescribing agreement detailing terms of continued prescribing of controlled substances, including monitoring CARI reports, urine drug screening, and pill counts if necessary. The patient is aware that inappropriate use will results in cessation of prescribing such medications.    As the clinician, I personally reviewed the CARI from 5/22/25 while the patient was in the office today.    History and physical exam exhibit continued safe and appropriate use of controlled substances.    Dictated utilizing Dragon dictation.

## 2025-05-23 DIAGNOSIS — M50.20 DISPLACEMENT OF CERVICAL INTERVERTEBRAL DISC WITHOUT MYELOPATHY: ICD-10-CM

## 2025-05-23 DIAGNOSIS — M51.369 DDD (DEGENERATIVE DISC DISEASE), LUMBAR: ICD-10-CM

## 2025-05-23 RX ORDER — HYDROCODONE BITARTRATE AND ACETAMINOPHEN 10; 325 MG/1; MG/1
TABLET ORAL
Qty: 150 TABLET | Refills: 0 | Status: SHIPPED | OUTPATIENT
Start: 2025-05-23

## 2025-05-23 NOTE — TELEPHONE ENCOUNTER
"  Caller: Colby Denny \"NONA\"    Relationship: Self    Best call back number: [phone] (home)     Requested Prescriptions:   Requested Prescriptions     Pending Prescriptions Disp Refills    HYDROcodone-acetaminophen (NORCO)  MG per tablet 150 tablet 0     Sig: Take 1 tablet every 4-6 hours as needed for pain. Max 5/day. 30 day supply. DNF 5/26/25        Pharmacy where request should be sent: [facility] #[id number] [address] [facility] [address]  [address] [facility] - [phone]  - [phone]     Last office visit with prescribing clinician: 5/22/2025   Last telemedicine visit with prescribing clinician: Visit date not found   Next office visit with prescribing clinician: Visit date not found     Additional details provided by patient: MR DENNY WOULD LIKE THE REFILL DATE CHANGED FROM 5/26/25 TO 5/25/25 SO HE CAN GET HIS REFILL ON SUNDAY BECAUSE THE PHARMACY IS CLOSED ON MONDAY DUE TO THE HOLIDAY    Does the patient have less than a 3 day supply:  [x] Yes  [] No    Would you like a call back once the refill request has been completed: [x] Yes [] No    If the office needs to give you a call back, can they leave a voicemail: [x] Yes [] No    Jak Huitron Rep   05/23/25 10:56 EDT           "

## 2025-06-23 ENCOUNTER — TELEPHONE (OUTPATIENT)
Dept: PAIN MEDICINE | Facility: CLINIC | Age: 60
End: 2025-06-23
Payer: COMMERCIAL

## 2025-06-23 DIAGNOSIS — M51.369 DDD (DEGENERATIVE DISC DISEASE), LUMBAR: ICD-10-CM

## 2025-06-23 DIAGNOSIS — M50.20 DISPLACEMENT OF CERVICAL INTERVERTEBRAL DISC WITHOUT MYELOPATHY: ICD-10-CM

## 2025-06-23 NOTE — TELEPHONE ENCOUNTER
"Caller: Colby Alvarado \"NONA\"    Relationship: Self    Best call back number: [phone]    Requested Prescriptions:   Requested Prescriptions     Pending Prescriptions Disp Refills    HYDROcodone-acetaminophen (NORCO)  MG per tablet 150 tablet 0     Sig: Take 1 tablet every 4-6 hours as needed for pain. Max 5/day. 30 day supply. DNF 5/25/25        Pharmacy where request should be sent:      Last office visit with prescribing clinician: Visit date not found   Last telemedicine visit with prescribing clinician: Visit date not found   Next office visit with prescribing clinician: Visit date not found     Additional details provided by patient: N/A    Does the patient have less than a 3 day supply:  [x] Yes  [] No    Would you like a call back once the refill request has been completed: [] Yes [x] No    If the office needs to give you a call back, can they leave a voicemail: [] Yes [x] No    Jak Rodriguez   06/23/25 09:28 EDT         "

## 2025-06-24 RX ORDER — HYDROCODONE BITARTRATE AND ACETAMINOPHEN 10; 325 MG/1; MG/1
TABLET ORAL
Qty: 150 TABLET | Refills: 0 | Status: SHIPPED | OUTPATIENT
Start: 2025-06-24

## 2025-07-22 DIAGNOSIS — M50.20 DISPLACEMENT OF CERVICAL INTERVERTEBRAL DISC WITHOUT MYELOPATHY: ICD-10-CM

## 2025-07-22 DIAGNOSIS — M51.369 DDD (DEGENERATIVE DISC DISEASE), LUMBAR: ICD-10-CM

## 2025-07-22 RX ORDER — HYDROCODONE BITARTRATE AND ACETAMINOPHEN 10; 325 MG/1; MG/1
TABLET ORAL
Qty: 150 TABLET | Refills: 0 | Status: SHIPPED | OUTPATIENT
Start: 2025-07-22

## 2025-07-22 NOTE — TELEPHONE ENCOUNTER
"Caller: Colby Alvarado \"NONA\"    Relationship: Self    Best call back number:     Requested Prescriptions:   Requested Prescriptions     Pending Prescriptions Disp Refills    HYDROcodone-acetaminophen (NORCO)  MG per tablet 150 tablet 0     Sig: Take 1 tablet every 4-6 hours as needed for pain. Max 5/day. 30 day supply.        Pharmacy where request should be sent: [facility] #[id number] [address] [facility] [address]  [address] [facility] [phone] [facility] [phone] FX     Last office visit with prescribing clinician: 5/22/2025   Last telemedicine visit with prescribing clinician: Visit date not found   Next office visit with prescribing clinician: 7/25/2025     Additional details provided by patient: PATIENT IS REQUESTING A REFILL OF THIS MEDICATION IF POSSIBLE. PLEASE ADVICE.    Does the patient have less than a 3 day supply:  [x] Yes  [] No    Would you like a call back once the refill request has been completed: [] Yes [x] No    If the office needs to give you a call back, can they leave a voicemail: [] Yes [x] No    Jak Mercer Rep   07/22/25 14:46 EDT       "

## 2025-07-25 ENCOUNTER — OFFICE VISIT (OUTPATIENT)
Age: 60
End: 2025-07-25
Payer: COMMERCIAL

## 2025-07-25 VITALS
HEIGHT: 70 IN | DIASTOLIC BLOOD PRESSURE: 80 MMHG | WEIGHT: 186 LBS | HEART RATE: 68 BPM | BODY MASS INDEX: 26.63 KG/M2 | SYSTOLIC BLOOD PRESSURE: 133 MMHG | RESPIRATION RATE: 18 BRPM | OXYGEN SATURATION: 96 % | TEMPERATURE: 97.1 F

## 2025-07-25 DIAGNOSIS — M54.16 LUMBAR RADICULITIS: ICD-10-CM

## 2025-07-25 DIAGNOSIS — M50.20 DISPLACEMENT OF CERVICAL INTERVERTEBRAL DISC WITHOUT MYELOPATHY: Primary | ICD-10-CM

## 2025-07-25 DIAGNOSIS — M48.062 LUMBAR STENOSIS WITH NEUROGENIC CLAUDICATION: ICD-10-CM

## 2025-07-25 DIAGNOSIS — M51.360 DEGENERATION OF INTERVERTEBRAL DISC OF LUMBAR REGION WITH DISCOGENIC BACK PAIN: ICD-10-CM

## 2025-07-25 DIAGNOSIS — M62.838 MUSCLE SPASM: ICD-10-CM

## 2025-07-25 DIAGNOSIS — M54.12 CERVICAL RADICULOPATHY: ICD-10-CM

## 2025-07-25 DIAGNOSIS — Z79.899 HIGH RISK MEDICATION USE: ICD-10-CM

## 2025-07-25 DIAGNOSIS — G89.29 CHRONIC LEFT SHOULDER PAIN: ICD-10-CM

## 2025-07-25 DIAGNOSIS — M54.16 LEFT LUMBAR RADICULOPATHY: ICD-10-CM

## 2025-07-25 DIAGNOSIS — M25.512 CHRONIC LEFT SHOULDER PAIN: ICD-10-CM

## 2025-07-25 DIAGNOSIS — M51.362 DEGENERATION OF INTERVERTEBRAL DISC OF LUMBAR REGION WITH DISCOGENIC BACK PAIN AND LOWER EXTREMITY PAIN: ICD-10-CM

## 2025-07-25 PROCEDURE — 99214 OFFICE O/P EST MOD 30 MIN: CPT

## 2025-07-25 NOTE — PROGRESS NOTES
CHIEF COMPLAINT  Back and neck pain    Subjective   Colby MURALI Alvarado is a 59 y.o. male  who presents for follow-up.  Today pain is 6/10VAS in severity. Pain is located in his neck and low back. Denies upper extremity pain. Pain will intermittently radiate down lateral thighs. Describes this pain as an intermittent aching and burning. He notes intermittent tingling to left anterior thigh. Pain is worsened by certain movements, prolonged positions (standing), bending/twisting, and walking long distances. Pain improves with rest, reposition, and medication. Neck pain is stable at this time.      Continues with Hydrocodone 10mg 5/day, Flexeril 10mg PRN, and Meloxicam 15mg daily. Denies any side effects from the regimen, including constipation and somnolence. The regimen helps decrease pain by a moderate amount. He works as a  and this helps gets through the day. ADL's by self. Denies any bowel or bladder changes.      Followed by Dr. Estela Townsend - last seen on 4/4/25 - received left subacromial bursa injection at this time     Procedures:  2/21/25 - Left L4 & L5 TF LESI - 50% ongoing relief  11/6/24 - C5-C6 SHAY - 50% ongoing relief  11/28/23 - C5-C6 SHAY - 60% relief for over 6 months   5/11/23 - Bilateral L3-L5 RFA - 50% ongoing relief  3/16/23 - Bilateral L3-L5 MBB - 80% relief x 24 hours  11/10/22 - bilateral L3-L5 MBB - 80% relief x a few hours  8/23/22 - bilateral L4 LTFESI - 90% relief of radicular symptoms - no relief of back pain  5/19/22 - SHAY - 50% ongoing relief  4/5/22 - SHAY - 80% relief x a couple of weeks     He has a history of lumbar spine surgery 15 years ago with Dr. Moon.     Back Pain  Onset:  More than 1 month ago  Frequency:  Daily  Progression since onset:  Unchanged (pan has remained consistent since his last office visit)  Pain location:  Lumbar spine  Pain quality:  Aching and burning  Radiates to:  Left thigh and right thigh (bilateral hips, lateral  legs)  Pain-numeric:  6/10 and 5/10  Aggravated by:  Lying down, standing and sitting (work, prolonged position, lifting)  Associated symptoms: no abdominal pain, no chest pain, no dysuria, no fever, no headaches, no numbness and no weakness    Treatments tried:  NSAIDs and analgesics  Improvement on treatment:  Mild  Neck Pain   This is a chronic problem. The current episode started more than 1 year ago. The problem occurs daily. The problem has been unchanged (pain has remained consistent sine his last office visit). The pain is present in the left side, midline and right side. The quality of the pain is described as aching and burning. The pain is at a severity of 5/10 (worse today due to right shoulder pain). The symptoms are aggravated by position and twisting. Pertinent negatives include no chest pain, fever, headaches, numbness or weakness. He has tried NSAIDs, acetaminophen, neck support, muscle relaxants and oral narcotics for the symptoms. The treatment provided mild relief.      PEG Assessment   What number best describes your pain on average in the past week?8  What number best describes how, during the past week, pain has interfered with your enjoyment of life?5  What number best describes how, during the past week, pain has interfered with your general activity?  5    Review of Pertinent Medical Data ---        The following portions of the patient's history were reviewed and updated as appropriate: allergies, current medications, past family history, past medical history, past social history, past surgical history, and problem list.    Review of Systems   Constitutional:  Positive for activity change (more). Negative for fever.   Cardiovascular:  Negative for chest pain.   Gastrointestinal:  Negative for abdominal pain, constipation and diarrhea.   Genitourinary:  Negative for difficulty urinating and dysuria.   Musculoskeletal:  Positive for back pain, neck pain and neck stiffness.   Neurological:   "Negative for dizziness, weakness, numbness and headaches.   Psychiatric/Behavioral:  Positive for sleep disturbance. Negative for self-injury and suicidal ideas.      I have reviewed and confirmed the accuracy of the ROS as documented by the MA/LPN/RN LUIS EDUARDO Leslie    Vitals:    07/25/25 0806   BP: 133/80   Pulse: 68   Resp: 18   Temp: 97.1 °F (36.2 °C)   SpO2: 96%   Weight: 84.4 kg (186 lb)   Height: 177.8 cm (70\")   PainSc: 6      Objective   Physical Exam  Constitutional:       Appearance: Normal appearance.   HENT:      Head: Normocephalic.   Cardiovascular:      Rate and Rhythm: Normal rate and regular rhythm.   Pulmonary:      Effort: Pulmonary effort is normal.      Breath sounds: Normal breath sounds.   Musculoskeletal:      Right shoulder: Tenderness and bony tenderness present. Decreased range of motion.      Left shoulder: Tenderness, bony tenderness and crepitus present. Decreased range of motion.      Cervical back: Normal range of motion. Tenderness present. No bony tenderness. No pain with movement.      Lumbar back: Tenderness and bony tenderness present. Decreased range of motion. Positive left straight leg raise test. Negative right straight leg raise test.      Right hip: Tenderness and bony tenderness present.   Skin:     General: Skin is warm and dry.      Capillary Refill: Capillary refill takes less than 2 seconds.   Neurological:      General: No focal deficit present.      Mental Status: He is alert and oriented to person, place, and time.   Psychiatric:         Mood and Affect: Mood normal.         Speech: Speech normal.         Behavior: Behavior normal.         Thought Content: Thought content normal.         Cognition and Memory: Cognition normal.     Assessment & Plan   Diagnoses and all orders for this visit:    1. Displacement of cervical intervertebral disc without myelopathy (Primary)    2. Degeneration of intervertebral disc of lumbar region with discogenic back pain and " lower extremity pain    3. Degeneration of intervertebral disc of lumbar region with discogenic back pain    4. Chronic left shoulder pain    5. Lumbar stenosis with neurogenic claudication    6. Left lumbar radiculopathy    7. Lumbar radiculitis    8. Cervical radiculopathy    9. Muscle spasm    10. High risk medication use      Colby Alvarado reports a pain score of 6.  Given his pain assessment as noted, treatment options were discussed and the following options were decided upon as a follow-up plan to address the patient's pain: continuation of current treatment plan for pain.    --- The urine drug screen confirmation from 5/22/25 has been reviewed and the result is appropriate based on patient history and CARI report  --- The patient signed an updated copy of the controlled substance agreement on 5/22/25  --- Continue Flexeril. No refill needed at this time.   --- Continue Hydrocodone. Patient reports that medication was picked up at his pharmacy yesterday. Patient appears stable with current regimen. No adverse effects. Regarding continuation of opioids, there is no evidence of aberrant behavior or any red flags.  The patient continues with appropriate response to opioid therapy. ADL's remain intact by self.   --- Repeat SHAY when needed  --- Follow up 2 months or sooner if needed      CARI REPORT  As part of the patient's treatment plan, I am prescribing controlled substances. The patient has been made aware of appropriate use of such medications, including potential risk of somnolence, limited ability to drive and/or work safely, and the potential for dependence or overdose. It has also been made clear that these medications are for use by this patient only, without concomitant use of alcohol or other substances unless prescribed.     Patient has completed prescribing agreement detailing terms of continued prescribing of controlled substances, including monitoring CARI reports, urine drug screening,  and pill counts if necessary. The patient is aware that inappropriate use will results in cessation of prescribing such medications.    As the clinician, I personally reviewed the CARI from 7/25/25 while the patient was in the office today.    History and physical exam exhibit continued safe and appropriate use of controlled substances.    Dictated utilizing Dragon dictation.

## 2025-08-19 DIAGNOSIS — M51.369 DDD (DEGENERATIVE DISC DISEASE), LUMBAR: ICD-10-CM

## 2025-08-19 DIAGNOSIS — M50.20 DISPLACEMENT OF CERVICAL INTERVERTEBRAL DISC WITHOUT MYELOPATHY: ICD-10-CM

## 2025-08-19 RX ORDER — HYDROCODONE BITARTRATE AND ACETAMINOPHEN 10; 325 MG/1; MG/1
TABLET ORAL
Qty: 150 TABLET | Refills: 0 | Status: SHIPPED | OUTPATIENT
Start: 2025-08-19

## 2025-08-26 DIAGNOSIS — M62.838 MUSCLE SPASM: ICD-10-CM

## 2025-08-26 RX ORDER — CYCLOBENZAPRINE HCL 10 MG
10 TABLET ORAL 2 TIMES DAILY PRN
Qty: 60 TABLET | Refills: 2 | Status: SHIPPED | OUTPATIENT
Start: 2025-08-26

## (undated) DEVICE — NDL SPINE 22G 5IN BLK

## (undated) DEVICE — NDL SPINE 22G 31/2IN BLK

## (undated) DEVICE — TBG ARTHSCP PT W CONN/REDUC 8FT

## (undated) DEVICE — NDL EPID TUOHY W/WINGS 20G 3.5IN

## (undated) DEVICE — GLV SURG TRIUMPH PF LTX 7.5 STRL

## (undated) DEVICE — UNDYED BRAIDED (POLYGLACTIN 910), SYNTHETIC ABSORBABLE SUTURE: Brand: COATED VICRYL

## (undated) DEVICE — INTENT TO BE USED WITH SUTURE MATERIAL FOR TISSUE CLOSURE: Brand: RICHARD-ALLAN® NEEDLE 1/2 CIRCLE TAPER

## (undated) DEVICE — Device

## (undated) DEVICE — SUT VIC 2/0 CT2 27IN J269H

## (undated) DEVICE — BLD SHAVER BONECUTTER 5MM 13CM

## (undated) DEVICE — Device: Brand: PORTEX

## (undated) DEVICE — GLV SURG SENSICARE POLYISPRN W/ALOE PF LF 6.5 GRN STRL

## (undated) DEVICE — MEDICINE CUP, GRADUATED, STER: Brand: MEDLINE

## (undated) DEVICE — EPIDURAL TRAY: Brand: MEDLINE INDUSTRIES, INC.

## (undated) DEVICE — PAD GRND E/S NT200IX RF/GEN W/CABL DISP

## (undated) DEVICE — DRSNG WND GZ CURAD OIL EMULSION 3X3IN STRL

## (undated) DEVICE — TP NDL SCORPION MULTIFIRE

## (undated) DEVICE — PENCL E/S HNDSWCH ROCKR CB

## (undated) DEVICE — INTENDED FOR TISSUE SEPARATION, AND OTHER PROCEDURES THAT REQUIRE A SHARP SURGICAL BLADE TO PUNCTURE OR CUT.: Brand: BARD-PARKER ® CARBON RIB-BACK BLADES

## (undated) DEVICE — GLV SURG BIOGEL LTX PF 6 1/2

## (undated) DEVICE — ABL ASP APOLLO RF XL 90D

## (undated) DEVICE — SKIN PREP TRAY W/CHG: Brand: MEDLINE INDUSTRIES, INC.

## (undated) DEVICE — PK ARTHSCP SHLDR TOWER 40

## (undated) DEVICE — BUR RND COOL CUT 8FLUT 4MM 13CM

## (undated) DEVICE — TOWEL,OR,DSP,ST,BLUE,STD,4/PK,20PK/CS: Brand: MEDLINE

## (undated) DEVICE — PAD GRND REM POLYHESIVE A/ DISP

## (undated) DEVICE — KT POSTN ARM TRIMANO BEACH CHR W/DRP